# Patient Record
Sex: FEMALE | Race: OTHER | HISPANIC OR LATINO
[De-identification: names, ages, dates, MRNs, and addresses within clinical notes are randomized per-mention and may not be internally consistent; named-entity substitution may affect disease eponyms.]

---

## 2019-10-21 ENCOUNTER — APPOINTMENT (OUTPATIENT)
Dept: INTERNAL MEDICINE | Facility: CLINIC | Age: 64
End: 2019-10-21
Payer: MEDICAID

## 2019-10-21 VITALS — HEIGHT: 55.5 IN | BODY MASS INDEX: 39.93 KG/M2 | WEIGHT: 175 LBS

## 2019-10-21 VITALS — DIASTOLIC BLOOD PRESSURE: 76 MMHG | RESPIRATION RATE: 20 BRPM | SYSTOLIC BLOOD PRESSURE: 116 MMHG | HEART RATE: 72 BPM

## 2019-10-21 DIAGNOSIS — Z23 ENCOUNTER FOR IMMUNIZATION: ICD-10-CM

## 2019-10-21 DIAGNOSIS — Z82.49 FAMILY HISTORY OF ISCHEMIC HEART DISEASE AND OTHER DISEASES OF THE CIRCULATORY SYSTEM: ICD-10-CM

## 2019-10-21 DIAGNOSIS — M15.9 POLYOSTEOARTHRITIS, UNSPECIFIED: ICD-10-CM

## 2019-10-21 DIAGNOSIS — Z63.4 DISAPPEARANCE AND DEATH OF FAMILY MEMBER: ICD-10-CM

## 2019-10-21 DIAGNOSIS — Z78.9 OTHER SPECIFIED HEALTH STATUS: ICD-10-CM

## 2019-10-21 PROCEDURE — G0009: CPT

## 2019-10-21 PROCEDURE — 36415 COLL VENOUS BLD VENIPUNCTURE: CPT

## 2019-10-21 PROCEDURE — 90732 PPSV23 VACC 2 YRS+ SUBQ/IM: CPT

## 2019-10-21 PROCEDURE — 90715 TDAP VACCINE 7 YRS/> IM: CPT

## 2019-10-21 PROCEDURE — 99386 PREV VISIT NEW AGE 40-64: CPT | Mod: 25

## 2019-10-21 PROCEDURE — 90472 IMMUNIZATION ADMIN EACH ADD: CPT

## 2019-10-21 RX ORDER — POTASSIUM CHLORIDE 1500 MG/1
20 TABLET, EXTENDED RELEASE ORAL
Refills: 4 | Status: ACTIVE | COMMUNITY
Start: 2019-10-21

## 2019-10-21 RX ORDER — UPADACITINIB 15 MG/1
15 TABLET, EXTENDED RELEASE ORAL AT BEDTIME
Refills: 0 | Status: ACTIVE | COMMUNITY
Start: 2019-10-21

## 2019-10-21 SDOH — SOCIAL STABILITY - SOCIAL INSECURITY: DISSAPEARANCE AND DEATH OF FAMILY MEMBER: Z63.4

## 2019-10-21 NOTE — PHYSICAL EXAM
[No Acute Distress] : no acute distress [Well Developed] : well developed [Well Nourished] : well nourished [Well-Appearing] : well-appearing [Normal Sclera/Conjunctiva] : normal sclera/conjunctiva [EOMI] : extraocular movements intact [PERRL] : pupils equal round and reactive to light [Normal Outer Ear/Nose] : the outer ears and nose were normal in appearance [Normal Oropharynx] : the oropharynx was normal [No JVD] : no jugular venous distention [No Lymphadenopathy] : no lymphadenopathy [Supple] : supple [Thyroid Normal, No Nodules] : the thyroid was normal and there were no nodules present [No Accessory Muscle Use] : no accessory muscle use [No Respiratory Distress] : no respiratory distress  [Clear to Auscultation] : lungs were clear to auscultation bilaterally [Normal Rate] : normal rate  [Regular Rhythm] : with a regular rhythm [Normal S1, S2] : normal S1 and S2 [No Murmur] : no murmur heard [No Carotid Bruits] : no carotid bruits [No Abdominal Bruit] : a ~M bruit was not heard ~T in the abdomen [No Varicosities] : no varicosities [Pedal Pulses Present] : the pedal pulses are present [No Edema] : there was no peripheral edema [No Palpable Aorta] : no palpable aorta [No Extremity Clubbing/Cyanosis] : no extremity clubbing/cyanosis [Soft] : abdomen soft [Non Tender] : non-tender [No Masses] : no abdominal mass palpated [Non-distended] : non-distended [No HSM] : no HSM [Normal Posterior Cervical Nodes] : no posterior cervical lymphadenopathy [Normal Bowel Sounds] : normal bowel sounds [No Spinal Tenderness] : no spinal tenderness [No CVA Tenderness] : no CVA  tenderness [Normal Anterior Cervical Nodes] : no anterior cervical lymphadenopathy [No Rash] : no rash [Grossly Normal Strength/Tone] : grossly normal strength/tone [No Joint Swelling] : no joint swelling [Coordination Grossly Intact] : coordination grossly intact [No Focal Deficits] : no focal deficits [Normal Gait] : normal gait [Normal Affect] : the affect was normal [Deep Tendon Reflexes (DTR)] : deep tendon reflexes were 2+ and symmetric [Normal Insight/Judgement] : insight and judgment were intact [Comprehensive Foot Exam Normal] : Right and left foot were examined and both feet are normal. No ulcers in either foot. Toes are normal and with full ROM.  Normal tactile sensation with monofilament testing throughout both feet

## 2019-10-21 NOTE — HISTORY OF PRESENT ILLNESS
[FreeTextEntry1] : For CPE [de-identified] : For cpe\par has history of dm, htn, hld bilateral djd\par has been having persistent knee pain and difficulty walking\par patient sees rheum\par she has strong family history of ashd and has recently seen cardio for yearly\par

## 2019-10-21 NOTE — COUNSELING
[Benefits of weight loss discussed] : Benefits of weight loss discussed [Encouraged to increase physical activity] : Encouraged to increase physical activity [Encouraged to maintain food diary] : Encouraged to maintain food diary

## 2019-10-21 NOTE — ASSESSMENT
[FreeTextEntry1] : see ortho for bilateral oa eval for replacement vs treatment options. \par obtain cardio record\par check labs\par tdap and pnuemovax given

## 2019-10-21 NOTE — HEALTH RISK ASSESSMENT
[Very Good] : ~his/her~ current health as very good [No] : No [] : No [FreeTextEntry1] : knee pain [No falls in past year] : Patient reported no falls in the past year [0] : 2) Feeling down, depressed, or hopeless: Not at all (0) [Patient reported PAP Smear was normal] : Patient reported PAP Smear was normal [Patient reported mammogram was normal] : Patient reported mammogram was normal [HIV test declined] : HIV test declined [Hepatitis C test declined] : Hepatitis C test declined [Patient reported colonoscopy was normal] : Patient reported colonoscopy was normal [Language] : denies difficulty with language [Change in mental status noted] : No change in mental status noted [Handling Complex Tasks] : denies difficulty handling complex tasks [Learning/Retaining New Information] : denies difficulty learning/retaining new information [Behavior] : denies difficulty with behavior [Reasoning] : denies difficulty with reasoning [Spatial Ability and Orientation] : denies difficulty with spatial ability and orientation [With Family] : lives with family [High School] : high school [Sexually Active] : not sexually active [Retired] : retired [High Risk Behavior] : no high risk behavior [Fully functional (bathing, dressing, toileting, transferring, walking, feeding)] : Fully functional (bathing, dressing, toileting, transferring, walking, feeding) [Feels Safe at Home] : Feels safe at home [Reports changes in vision] : Reports no changes in vision [Reports changes in hearing] : Reports no changes in hearing [Reports changes in dental health] : Reports no changes in dental health [Smoke Detector] : smoke detector [Guns at Home] : no guns at home [Carbon Monoxide Detector] : no carbon monoxide detector [Seat Belt] :  uses seat belt [Safety elements used in home] : safety elements used in home [Sunscreen] : does not use sunscreen [Travel to Developing Areas] : does not  travel to developing areas [ColonoscopyDate] : 2016 [BoneDensityDate] : at [facility] [AdvancecareDate] : 10/21/19 [Patient/Caregiver not ready to engage] : Patient/Caregiver not ready to engage

## 2019-10-22 ENCOUNTER — CHART COPY (OUTPATIENT)
Age: 64
End: 2019-10-22

## 2019-10-22 LAB
25(OH)D3 SERPL-MCNC: 33.4 NG/ML
ALBUMIN SERPL ELPH-MCNC: 3.6 G/DL
ALP BLD-CCNC: 77 U/L
ALT SERPL-CCNC: 23 U/L
ANION GAP SERPL CALC-SCNC: 14 MMOL/L
APPEARANCE: CLEAR
AST SERPL-CCNC: 34 U/L
BASOPHILS # BLD AUTO: 0.03 K/UL
BASOPHILS NFR BLD AUTO: 0.3 %
BILIRUB SERPL-MCNC: 0.2 MG/DL
BILIRUBIN URINE: NEGATIVE
BLOOD URINE: NEGATIVE
BUN SERPL-MCNC: 12 MG/DL
CALCIUM SERPL-MCNC: 9.9 MG/DL
CHLORIDE SERPL-SCNC: 92 MMOL/L
CHOLEST SERPL-MCNC: 178 MG/DL
CHOLEST/HDLC SERPL: 3.6 RATIO
CO2 SERPL-SCNC: 28 MMOL/L
COLOR: YELLOW
CREAT SERPL-MCNC: 0.71 MG/DL
CREAT SPEC-SCNC: 101 MG/DL
EOSINOPHIL # BLD AUTO: 0.1 K/UL
EOSINOPHIL NFR BLD AUTO: 1.1 %
ESTIMATED AVERAGE GLUCOSE: 169 MG/DL
GLUCOSE QUALITATIVE U: NEGATIVE
GLUCOSE SERPL-MCNC: 152 MG/DL
HBA1C MFR BLD HPLC: 7.5 %
HCT VFR BLD CALC: 29.9 %
HCV AB SER QL: NONREACTIVE
HCV S/CO RATIO: 0.14 S/CO
HDLC SERPL-MCNC: 49 MG/DL
HGB BLD-MCNC: 9 G/DL
IMM GRANULOCYTES NFR BLD AUTO: 0.6 %
KETONES URINE: NEGATIVE
LDLC SERPL CALC-MCNC: 88 MG/DL
LEUKOCYTE ESTERASE URINE: NEGATIVE
LYMPHOCYTES # BLD AUTO: 1.29 K/UL
LYMPHOCYTES NFR BLD AUTO: 13.9 %
MAN DIFF?: NORMAL
MCHC RBC-ENTMCNC: 22.9 PG
MCHC RBC-ENTMCNC: 30.1 GM/DL
MCV RBC AUTO: 76.1 FL
MICROALBUMIN 24H UR DL<=1MG/L-MCNC: <1.2 MG/DL
MICROALBUMIN/CREAT 24H UR-RTO: NORMAL MG/G
MONOCYTES # BLD AUTO: 0.52 K/UL
MONOCYTES NFR BLD AUTO: 5.6 %
NEUTROPHILS # BLD AUTO: 7.25 K/UL
NEUTROPHILS NFR BLD AUTO: 78.5 %
NITRITE URINE: NEGATIVE
PH URINE: 6.5
PLATELET # BLD AUTO: 576 K/UL
POTASSIUM SERPL-SCNC: 4.1 MMOL/L
PROT SERPL-MCNC: 6.9 G/DL
PROTEIN URINE: NEGATIVE
RBC # BLD: 3.93 M/UL
RBC # FLD: 17.2 %
SODIUM SERPL-SCNC: 134 MMOL/L
SPECIFIC GRAVITY URINE: 1.02
T4 FREE SERPL-MCNC: 1.4 NG/DL
TRIGL SERPL-MCNC: 206 MG/DL
TSH SERPL-ACNC: 2.5 UIU/ML
UROBILINOGEN URINE: NORMAL
WBC # FLD AUTO: 9.25 K/UL

## 2019-10-26 LAB — HUMAN IMMUNODEFICIENCY VIRUS 1 (HIV-1) QUALITATIVE, RNA: NEGATIVE

## 2019-12-16 ENCOUNTER — APPOINTMENT (OUTPATIENT)
Dept: INTERNAL MEDICINE | Facility: CLINIC | Age: 64
End: 2019-12-16
Payer: MEDICARE

## 2019-12-16 ENCOUNTER — LABORATORY RESULT (OUTPATIENT)
Age: 64
End: 2019-12-16

## 2019-12-16 VITALS — RESPIRATION RATE: 20 BRPM | HEART RATE: 70 BPM | DIASTOLIC BLOOD PRESSURE: 78 MMHG | SYSTOLIC BLOOD PRESSURE: 114 MMHG

## 2019-12-16 VITALS — WEIGHT: 162 LBS | HEIGHT: 55.5 IN | BODY MASS INDEX: 36.96 KG/M2

## 2019-12-16 LAB
ALBUMIN SERPL ELPH-MCNC: 3.2 G/DL
ALP BLD-CCNC: 86 U/L
ALT SERPL-CCNC: 19 U/L
ANION GAP SERPL CALC-SCNC: 11 MMOL/L
AST SERPL-CCNC: 29 U/L
BILIRUB SERPL-MCNC: 0.2 MG/DL
BUN SERPL-MCNC: 14 MG/DL
CALCIUM SERPL-MCNC: 9.4 MG/DL
CHLORIDE SERPL-SCNC: 94 MMOL/L
CO2 SERPL-SCNC: 29 MMOL/L
CREAT SERPL-MCNC: 0.64 MG/DL
FERRITIN SERPL-MCNC: 945 NG/ML
GLUCOSE SERPL-MCNC: 176 MG/DL
IRON SATN MFR SERPL: 12 %
IRON SERPL-MCNC: 28 UG/DL
POTASSIUM SERPL-SCNC: 4.1 MMOL/L
PROT SERPL-MCNC: 7 G/DL
SODIUM SERPL-SCNC: 134 MMOL/L
TIBC SERPL-MCNC: 217 UG/DL

## 2019-12-16 PROCEDURE — 36415 COLL VENOUS BLD VENIPUNCTURE: CPT

## 2019-12-16 PROCEDURE — 99214 OFFICE O/P EST MOD 30 MIN: CPT | Mod: 25

## 2019-12-16 NOTE — HISTORY OF PRESENT ILLNESS
[de-identified] : patient here for follow up \par has been to rheum and found to have hgb 7.4 secondary to her RA\par patient has been off her meds now for few months and her RA flared up\par she feels tiired and is very stiff.. she denies chest pain sob nvd \par she has noted no blood in her stool or reflux symptoms\par she came today becsue she was advised blood transfusion but was not sure of it [FreeTextEntry1] : follow up

## 2019-12-16 NOTE — PHYSICAL EXAM
[No Acute Distress] : no acute distress [Well Nourished] : well nourished [Normal Sclera/Conjunctiva] : normal sclera/conjunctiva [EOMI] : extraocular movements intact [PERRL] : pupils equal round and reactive to light [Normal Outer Ear/Nose] : the outer ears and nose were normal in appearance [Normal Oropharynx] : the oropharynx was normal [No JVD] : no jugular venous distention [No Lymphadenopathy] : no lymphadenopathy [Supple] : supple [No Respiratory Distress] : no respiratory distress  [Thyroid Normal, No Nodules] : the thyroid was normal and there were no nodules present [Clear to Auscultation] : lungs were clear to auscultation bilaterally [No Accessory Muscle Use] : no accessory muscle use [Normal Rate] : normal rate  [Regular Rhythm] : with a regular rhythm [Normal S1, S2] : normal S1 and S2 [No Murmur] : no murmur heard [No Carotid Bruits] : no carotid bruits [No Abdominal Bruit] : a ~M bruit was not heard ~T in the abdomen [No Varicosities] : no varicosities [Pedal Pulses Present] : the pedal pulses are present [No Edema] : there was no peripheral edema [No Palpable Aorta] : no palpable aorta [No Extremity Clubbing/Cyanosis] : no extremity clubbing/cyanosis [Non Tender] : non-tender [Soft] : abdomen soft [Non-distended] : non-distended [No HSM] : no HSM [No Masses] : no abdominal mass palpated [Normal Bowel Sounds] : normal bowel sounds [Normal Posterior Cervical Nodes] : no posterior cervical lymphadenopathy [Normal Anterior Cervical Nodes] : no anterior cervical lymphadenopathy [No CVA Tenderness] : no CVA  tenderness [No Spinal Tenderness] : no spinal tenderness [Coordination Grossly Intact] : coordination grossly intact [No Rash] : no rash [No Focal Deficits] : no focal deficits [Deep Tendon Reflexes (DTR)] : deep tendon reflexes were 2+ and symmetric [Normal Gait] : normal gait [Normal Affect] : the affect was normal [Normal Insight/Judgement] : insight and judgment were intact [de-identified] : very stiff joints knee and elbows, wrist. [de-identified] : pale lookin

## 2019-12-16 NOTE — ASSESSMENT
[FreeTextEntry1] : refer to heme onc\par check stat cbc to confirm anemia, reached out to RHeum but not in the office\par so will send stat labs out\par hold bp meds bp moderately low today\par patient very stiff so likely its RA related

## 2019-12-23 ENCOUNTER — CHART COPY (OUTPATIENT)
Age: 64
End: 2019-12-23

## 2020-01-07 ENCOUNTER — APPOINTMENT (OUTPATIENT)
Dept: INTERNAL MEDICINE | Facility: CLINIC | Age: 65
End: 2020-01-07
Payer: MEDICARE

## 2020-01-07 VITALS
RESPIRATION RATE: 12 BRPM | SYSTOLIC BLOOD PRESSURE: 110 MMHG | HEIGHT: 55.5 IN | HEART RATE: 68 BPM | BODY MASS INDEX: 36.96 KG/M2 | DIASTOLIC BLOOD PRESSURE: 78 MMHG | WEIGHT: 162 LBS

## 2020-01-07 PROCEDURE — 99214 OFFICE O/P EST MOD 30 MIN: CPT | Mod: 25

## 2020-01-07 PROCEDURE — 36415 COLL VENOUS BLD VENIPUNCTURE: CPT

## 2020-01-07 NOTE — ASSESSMENT
[FreeTextEntry1] : anemia s/p transfusion check cbc\par dm stable last labs\par bp stble\par see rheum and heme for anemia issues

## 2020-01-07 NOTE — PHYSICAL EXAM
[Well Nourished] : well nourished [No Acute Distress] : no acute distress [Well Developed] : well developed [Well-Appearing] : well-appearing [Normal Sclera/Conjunctiva] : normal sclera/conjunctiva [PERRL] : pupils equal round and reactive to light [EOMI] : extraocular movements intact [Normal Outer Ear/Nose] : the outer ears and nose were normal in appearance [Normal Oropharynx] : the oropharynx was normal [No JVD] : no jugular venous distention [No Lymphadenopathy] : no lymphadenopathy [Supple] : supple [Thyroid Normal, No Nodules] : the thyroid was normal and there were no nodules present [No Accessory Muscle Use] : no accessory muscle use [No Respiratory Distress] : no respiratory distress  [Normal Rate] : normal rate  [Clear to Auscultation] : lungs were clear to auscultation bilaterally [Regular Rhythm] : with a regular rhythm [Normal S1, S2] : normal S1 and S2 [No Murmur] : no murmur heard [No Carotid Bruits] : no carotid bruits [No Varicosities] : no varicosities [No Abdominal Bruit] : a ~M bruit was not heard ~T in the abdomen [Pedal Pulses Present] : the pedal pulses are present [No Edema] : there was no peripheral edema [No Palpable Aorta] : no palpable aorta [No Extremity Clubbing/Cyanosis] : no extremity clubbing/cyanosis [Soft] : abdomen soft [Non Tender] : non-tender [Non-distended] : non-distended [No Masses] : no abdominal mass palpated [No HSM] : no HSM [Normal Bowel Sounds] : normal bowel sounds [Normal Posterior Cervical Nodes] : no posterior cervical lymphadenopathy [Normal Anterior Cervical Nodes] : no anterior cervical lymphadenopathy [No CVA Tenderness] : no CVA  tenderness [No Spinal Tenderness] : no spinal tenderness [No Joint Swelling] : no joint swelling [Grossly Normal Strength/Tone] : grossly normal strength/tone [No Rash] : no rash [Coordination Grossly Intact] : coordination grossly intact [No Focal Deficits] : no focal deficits [Normal Gait] : normal gait [Deep Tendon Reflexes (DTR)] : deep tendon reflexes were 2+ and symmetric [Normal Affect] : the affect was normal [Normal Insight/Judgement] : insight and judgment were intact

## 2020-01-07 NOTE — HISTORY OF PRESENT ILLNESS
[FreeTextEntry1] : follow up hospitalization in December [de-identified] : was in hosptial with anemia of chronic disease secondary to RA\par patient received transfusion but got a reaction\par patient has no chest pain sob \par now back on humira by rheum and is on prednisone 5mg\par she will see heme onc soon

## 2020-01-08 LAB
BASOPHILS # BLD AUTO: 0.05 K/UL
BASOPHILS NFR BLD AUTO: 0.3 %
EOSINOPHIL # BLD AUTO: 0.18 K/UL
EOSINOPHIL NFR BLD AUTO: 1.1 %
HCT VFR BLD CALC: 31.7 %
HGB BLD-MCNC: 9.6 G/DL
IMM GRANULOCYTES NFR BLD AUTO: 0.9 %
LYMPHOCYTES # BLD AUTO: 1.21 K/UL
LYMPHOCYTES NFR BLD AUTO: 7.7 %
MAN DIFF?: NORMAL
MCHC RBC-ENTMCNC: 24.1 PG
MCHC RBC-ENTMCNC: 30.3 GM/DL
MCV RBC AUTO: 79.6 FL
MONOCYTES # BLD AUTO: 0.46 K/UL
MONOCYTES NFR BLD AUTO: 2.9 %
NEUTROPHILS # BLD AUTO: 13.63 K/UL
NEUTROPHILS NFR BLD AUTO: 87.1 %
PLATELET # BLD AUTO: 579 K/UL
RBC # BLD: 3.98 M/UL
RBC # FLD: 19.4 %
WBC # FLD AUTO: 15.67 K/UL

## 2020-01-24 ENCOUNTER — APPOINTMENT (OUTPATIENT)
Dept: INTERNAL MEDICINE | Facility: CLINIC | Age: 65
End: 2020-01-24
Payer: MEDICARE

## 2020-01-24 ENCOUNTER — INPATIENT (INPATIENT)
Facility: HOSPITAL | Age: 65
LOS: 10 days | Discharge: HOME | DRG: 177 | End: 2020-02-04
Attending: HOSPITALIST | Admitting: INTERNAL MEDICINE
Payer: MEDICARE

## 2020-01-24 VITALS
DIASTOLIC BLOOD PRESSURE: 70 MMHG | RESPIRATION RATE: 24 BRPM | HEART RATE: 120 BPM | OXYGEN SATURATION: 92 % | SYSTOLIC BLOOD PRESSURE: 110 MMHG

## 2020-01-24 VITALS
OXYGEN SATURATION: 95 % | WEIGHT: 153 LBS | DIASTOLIC BLOOD PRESSURE: 63 MMHG | HEIGHT: 59 IN | TEMPERATURE: 99 F | RESPIRATION RATE: 16 BRPM | SYSTOLIC BLOOD PRESSURE: 136 MMHG | HEART RATE: 101 BPM

## 2020-01-24 VITALS — BODY MASS INDEX: 37.19 KG/M2 | WEIGHT: 163 LBS | HEIGHT: 55.5 IN

## 2020-01-24 DIAGNOSIS — E87.70 FLUID OVERLOAD, UNSPECIFIED: ICD-10-CM

## 2020-01-24 DIAGNOSIS — I50.9 HEART FAILURE, UNSPECIFIED: ICD-10-CM

## 2020-01-24 LAB
ALBUMIN SERPL ELPH-MCNC: 2.7 G/DL — LOW (ref 3.3–5.2)
ALP SERPL-CCNC: 81 U/L — SIGNIFICANT CHANGE UP (ref 40–120)
ALT FLD-CCNC: 30 U/L — SIGNIFICANT CHANGE UP
ANION GAP SERPL CALC-SCNC: 11 MMOL/L — SIGNIFICANT CHANGE UP (ref 5–17)
APTT BLD: 24.2 SEC — LOW (ref 27.5–36.3)
AST SERPL-CCNC: 40 U/L — HIGH
BASOPHILS # BLD AUTO: 0.03 K/UL — SIGNIFICANT CHANGE UP (ref 0–0.2)
BASOPHILS NFR BLD AUTO: 0.2 % — SIGNIFICANT CHANGE UP (ref 0–2)
BILIRUB SERPL-MCNC: 0.3 MG/DL — LOW (ref 0.4–2)
BUN SERPL-MCNC: 12 MG/DL — SIGNIFICANT CHANGE UP (ref 8–20)
CALCIUM SERPL-MCNC: 7.8 MG/DL — LOW (ref 8.6–10.2)
CHLORIDE SERPL-SCNC: 94 MMOL/L — LOW (ref 98–107)
CO2 SERPL-SCNC: 29 MMOL/L — SIGNIFICANT CHANGE UP (ref 22–29)
CREAT SERPL-MCNC: 0.63 MG/DL — SIGNIFICANT CHANGE UP (ref 0.5–1.3)
EOSINOPHIL # BLD AUTO: 0.19 K/UL — SIGNIFICANT CHANGE UP (ref 0–0.5)
EOSINOPHIL NFR BLD AUTO: 1.1 % — SIGNIFICANT CHANGE UP (ref 0–6)
GLUCOSE BLDC GLUCOMTR-MCNC: 132 MG/DL — HIGH (ref 70–99)
GLUCOSE SERPL-MCNC: 130 MG/DL — HIGH (ref 70–99)
HCT VFR BLD CALC: 27.9 % — LOW (ref 34.5–45)
HGB BLD-MCNC: 8.4 G/DL — LOW (ref 11.5–15.5)
IMM GRANULOCYTES NFR BLD AUTO: 1.5 % — SIGNIFICANT CHANGE UP (ref 0–1.5)
INR BLD: 1.21 RATIO — HIGH (ref 0.88–1.16)
LYMPHOCYTES # BLD AUTO: 0.95 K/UL — LOW (ref 1–3.3)
LYMPHOCYTES # BLD AUTO: 5.6 % — LOW (ref 13–44)
MCHC RBC-ENTMCNC: 23.4 PG — LOW (ref 27–34)
MCHC RBC-ENTMCNC: 30.1 GM/DL — LOW (ref 32–36)
MCV RBC AUTO: 77.7 FL — LOW (ref 80–100)
MONOCYTES # BLD AUTO: 0.45 K/UL — SIGNIFICANT CHANGE UP (ref 0–0.9)
MONOCYTES NFR BLD AUTO: 2.7 % — SIGNIFICANT CHANGE UP (ref 2–14)
NEUTROPHILS # BLD AUTO: 15.05 K/UL — HIGH (ref 1.8–7.4)
NEUTROPHILS NFR BLD AUTO: 88.9 % — HIGH (ref 43–77)
NT-PROBNP SERPL-SCNC: 1079 PG/ML — HIGH (ref 0–300)
PLATELET # BLD AUTO: 693 K/UL — HIGH (ref 150–400)
POTASSIUM SERPL-MCNC: 3.6 MMOL/L — SIGNIFICANT CHANGE UP (ref 3.5–5.3)
POTASSIUM SERPL-SCNC: 3.6 MMOL/L — SIGNIFICANT CHANGE UP (ref 3.5–5.3)
PROT SERPL-MCNC: 6.9 G/DL — SIGNIFICANT CHANGE UP (ref 6.6–8.7)
PROTHROM AB SERPL-ACNC: 14 SEC — HIGH (ref 10–12.9)
RAPID RVP RESULT: SIGNIFICANT CHANGE UP
RBC # BLD: 3.59 M/UL — LOW (ref 3.8–5.2)
RBC # FLD: 18.6 % — HIGH (ref 10.3–14.5)
SODIUM SERPL-SCNC: 134 MMOL/L — LOW (ref 135–145)
TROPONIN T SERPL-MCNC: 0.1 NG/ML — HIGH (ref 0–0.06)
TROPONIN T SERPL-MCNC: 0.12 NG/ML — HIGH (ref 0–0.06)
WBC # BLD: 16.93 K/UL — HIGH (ref 3.8–10.5)
WBC # FLD AUTO: 16.93 K/UL — HIGH (ref 3.8–10.5)

## 2020-01-24 PROCEDURE — 99215 OFFICE O/P EST HI 40 MIN: CPT

## 2020-01-24 PROCEDURE — 93010 ELECTROCARDIOGRAM REPORT: CPT

## 2020-01-24 PROCEDURE — 99285 EMERGENCY DEPT VISIT HI MDM: CPT

## 2020-01-24 PROCEDURE — 99223 1ST HOSP IP/OBS HIGH 75: CPT

## 2020-01-24 PROCEDURE — 71045 X-RAY EXAM CHEST 1 VIEW: CPT | Mod: 26

## 2020-01-24 RX ORDER — IPRATROPIUM/ALBUTEROL SULFATE 18-103MCG
3 AEROSOL WITH ADAPTER (GRAM) INHALATION ONCE
Refills: 0 | Status: COMPLETED | OUTPATIENT
Start: 2020-01-24 | End: 2020-01-24

## 2020-01-24 RX ORDER — HEPARIN SODIUM 5000 [USP'U]/ML
5000 INJECTION INTRAVENOUS; SUBCUTANEOUS EVERY 12 HOURS
Refills: 0 | Status: DISCONTINUED | OUTPATIENT
Start: 2020-01-24 | End: 2020-01-26

## 2020-01-24 RX ORDER — DEXTROSE 50 % IN WATER 50 %
12.5 SYRINGE (ML) INTRAVENOUS ONCE
Refills: 0 | Status: DISCONTINUED | OUTPATIENT
Start: 2020-01-24 | End: 2020-02-04

## 2020-01-24 RX ORDER — METOPROLOL TARTRATE 50 MG
12.5 TABLET ORAL
Refills: 0 | Status: DISCONTINUED | OUTPATIENT
Start: 2020-01-24 | End: 2020-01-27

## 2020-01-24 RX ORDER — INSULIN LISPRO 100/ML
VIAL (ML) SUBCUTANEOUS
Refills: 0 | Status: DISCONTINUED | OUTPATIENT
Start: 2020-01-24 | End: 2020-02-04

## 2020-01-24 RX ORDER — GLUCAGON INJECTION, SOLUTION 0.5 MG/.1ML
1 INJECTION, SOLUTION SUBCUTANEOUS ONCE
Refills: 0 | Status: DISCONTINUED | OUTPATIENT
Start: 2020-01-24 | End: 2020-02-04

## 2020-01-24 RX ORDER — FUROSEMIDE 40 MG
40 TABLET ORAL ONCE
Refills: 0 | Status: COMPLETED | OUTPATIENT
Start: 2020-01-24 | End: 2020-01-24

## 2020-01-24 RX ORDER — POTASSIUM CHLORIDE 20 MEQ
20 PACKET (EA) ORAL DAILY
Refills: 0 | Status: DISCONTINUED | OUTPATIENT
Start: 2020-01-24 | End: 2020-02-04

## 2020-01-24 RX ORDER — DEXTROSE 50 % IN WATER 50 %
15 SYRINGE (ML) INTRAVENOUS ONCE
Refills: 0 | Status: DISCONTINUED | OUTPATIENT
Start: 2020-01-24 | End: 2020-02-04

## 2020-01-24 RX ORDER — SODIUM CHLORIDE 9 MG/ML
1000 INJECTION, SOLUTION INTRAVENOUS
Refills: 0 | Status: DISCONTINUED | OUTPATIENT
Start: 2020-01-24 | End: 2020-02-04

## 2020-01-24 RX ORDER — DEXTROSE 50 % IN WATER 50 %
25 SYRINGE (ML) INTRAVENOUS ONCE
Refills: 0 | Status: DISCONTINUED | OUTPATIENT
Start: 2020-01-24 | End: 2020-02-04

## 2020-01-24 RX ORDER — FUROSEMIDE 40 MG
40 TABLET ORAL DAILY
Refills: 0 | Status: DISCONTINUED | OUTPATIENT
Start: 2020-01-25 | End: 2020-01-26

## 2020-01-24 RX ADMIN — Medication 40 MILLIGRAM(S): at 14:38

## 2020-01-24 RX ADMIN — HEPARIN SODIUM 5000 UNIT(S): 5000 INJECTION INTRAVENOUS; SUBCUTANEOUS at 19:27

## 2020-01-24 RX ADMIN — Medication 3 MILLILITER(S): at 14:03

## 2020-01-24 RX ADMIN — Medication 12.5 MILLIGRAM(S): at 19:27

## 2020-01-24 NOTE — REVIEW OF SYSTEMS
[Fever] : no fever [Chills] : no chills [Fatigue] : fatigue [Night Sweats] : no night sweats [Recent Change In Weight] : ~T no recent weight change [Palpitations] : palpitations [Shortness Of Breath] : shortness of breath [Dyspnea on Exertion] : dyspnea on exertion [Cough] : cough

## 2020-01-24 NOTE — ED ADULT NURSE NOTE - OBJECTIVE STATEMENT
Pt had a blood transfusion back in December and went to say for the follow up. Pt states that she was still feeling short of breath. Has been feeling this way since being transfused in December.

## 2020-01-24 NOTE — H&P ADULT - ASSESSMENT
The patient is a 64 year old female with a history of rheumatoid arthritis on DMARD agents and prednisone, diabetes mellitus type 2, hypertension and chronic iron deficiency anemia who was referred to the ER by her PMD for complaints of difficulty breathing. According to the patient, she was admitted to [facility] from 12/17-12/20 for anemia. She was transfused 2 units of PRBC and discharged. SInce being discharged from the hospital she has had complaints of progressively worsening dyspnea on exertion associated with lower extremity edema. She was evaluated by her PMD today who referred her to the ER for evaluation. In the ER, noted to have SPo2 of 91% on room air improved with NC. Labs positive for a BNP of 1079 and troponin of 0.10. Patient denies complaints of chest pain, had stress test 3 months ago which she states was normal. Chest xray consistent with cardiomegaly and pulmonary edema. Admitted for acute CHF.     Assessment/Plan:    1. Acute CHF: EF unknown  IV lasix 40mg daily  Daily weight  Monitor I&Os  Echocardiogram  Cardiology evaluation requested    2. Elevated troponin: Trend cardiac enzymes  Denies complaints of chest pain  EKG with no acute changes  Cardio eval requested    3. Anemia on chronic disease: Monitor HB/hct    4. Hyponatremia  likely secondary to volume overload  MOnitor BMP    5. RA: Continue prednisone PO    6. Hypertension: Metoprolol PO   MOnitor BP    VTE_ Heparin subcut

## 2020-01-24 NOTE — ED PROVIDER NOTE - CLINICAL SUMMARY MEDICAL DECISION MAKING FREE TEXT BOX
65 yo F hx of anemia, CHF, HDL, DM p/w hypoxia and sob. she was found 91% on RA with rales and wheezing on the base. duoneb x 1 given. ekg showed sinus tachycardia. trop 0.10 and probnp 1079. lasix 40 mg IV given. concerned for CHF will need admission. 63 yo F hx of anemia, CHF, HDL, DM p/w hypoxia and sob. she was found 91% on RA with rales and wheezing on the base. duoneb x 1 given. ekg showed sinus tachycardia. trop 0.10 and probnp 1079. chf showed b/l pulmonary edema. lasix 40 mg IV given. concerned for CHF will need admission.

## 2020-01-24 NOTE — ED PROVIDER NOTE - PROGRESS NOTE DETAILS
I spoke to Dr. Rios for admission I spoke Dr. Zavala from [facility], will come to see the patient.

## 2020-01-24 NOTE — PHYSICAL EXAM
[Ill-Appearing] : ill-appearing [Normal] : the outer ears and nose were normal in appearance and the oropharynx was normal [de-identified] : appears dyspneic [de-identified] : preeti izquierdo [de-identified] : bilateral rales 1/3 way up

## 2020-01-24 NOTE — CONSULT NOTE ADULT - SUBJECTIVE AND OBJECTIVE BOX
[facility], [facility]                                                    [address], [address], [place] [address]                                                         PHONE: [phone]    FAX: [phone] [address], [address], [place] [address]                                                 PHONE: [phone]    FAX: [phone]  *******************************************************************************    Reason for Consult: Congestive Heart Failure    HPI:  MELY LIN is a 64y Female    PAST MEDICAL & SURGICAL HISTORY:      aspirin (Stomach Upset; Nausea)  penicillin (Angioedema)      MEDICATIONS  (STANDING):    MEDICATIONS  (PRN):      Social History: no active tobacco / EtOH / IVDA    Family History:     ROS: As noted above, otherwise unremarkable.    Vital Signs Last 24 Hrs  T(C): 37.1 (24 Jan 2020 12:11), Max: 37.1 (24 Jan 2020 12:11)  T(F): 98.8 (24 Jan 2020 12:11), Max: 98.8 (24 Jan 2020 12:11)  HR: 101 (24 Jan 2020 12:11) (101 - 101)  BP: 136/63 (24 Jan 2020 12:11) (136/63 - 136/63)  BP(mean): --  RR: 16 (24 Jan 2020 12:11) (16 - 16)  SpO2: 91% (24 Jan 2020 12:54) (91% - 95%)    I&O's Detail    I&O's Summary          PHYSICAL EXAM:  General: Appears well developed, well nourished, no acute distress  HEENT: Head: normocephalic, atraumatic  Eyes: Pupils equal and reactive  Neck: Supple, no carotid bruit, no JVD, no HJR  CARDIOVASCULAR: Normal S1 and S2, no murmur, rub, or gallop  LUNGS: Clear to auscultation bilaterally, no rales, rhonchi or wheeze  ABDOMEN: Soft, nontender, non-distended, positive bowel sounds, no mass or bruit  EXTREMITIES: No edema, distal pulses WNL  SKIN: Warm and dry with normal turgor  NEURO: Alert & oriented x 3, grossly intact  PSYCH: normal mood and affect    LABS:                        8.4    16.93 )-----------( 693      ( 24 Jan 2020 13:30 )             27.9     01-24    134<L>  |  94<L>  |  12.0  ----------------------------<  130<H>  3.6   |  29.0  |  0.63    Ca    7.8<L>      24 Jan 2020 13:30    TPro  6.9  /  Alb  2.7<L>  /  TBili  0.3<L>  /  DBili  x   /  AST  40<H>  /  ALT  30  /  AlkPhos  81  01-24    CARDIAC MARKERS ( 24 Jan 2020 13:30 )  x     / 0.10 ng/mL / x     / x     / x          PT/INR - ( 24 Jan 2020 13:30 )   PT: 14.0 sec;   INR: 1.21 ratio         PTT - ( 24 Jan 2020 13:30 )  PTT:24.2 sec  Serum Pro-Brain Natriuretic Peptide: 1079 pg/mL (01-24 @ 13:30)      RADIOLOGY & ADDITIONAL STUDIES:    ECG: p    ECHO:    STRESS TEST:    CARDIAC CATHETERIZATION:    Assessment and Plan:  In summary, MELY LIN is a 64y Female with past medical history significant for CAD,  (nml nuc 6/25/19),  HTN HL DM s/p SOB last few days, hypoxia  likely multifactorial URI/CHF No distress now laying flay , comfortable    - Monitor on telemetry  - Check troponins x3 will trend <.5 thus far hospitalist will enter additional troponins and call me if troponin >1.0  - cont outpt meds   - PT WAS NOT ON ASA AS OUTPT DUE TO HX ANEMIA - GIVE X 1  - Repeat EKG - TO BE DONE BY DR. SMITH who will review and call me with any abn or questions  - Echocardiogram - 6/18/19 - nml ef no severe valve dz  - Monitor repeat CXRs, strict I/Os, daily weights, & BUN/Cr closely and titrate PRN.  - No evidence of ischemia clinically.  Continue medical management for now pending the results of the above.  Plan for eventual ischemic evaluation (likely as an outpatient) once patient is clinically stable.  - Rhythm/hemodynamics stable = continue current doses for now and titrate PRN  - Keep K > 4, Mg > 2  - ECG - sinus no acute st/t  - pt given Lasix IV by Dr. Smith , will address daily based on sx  - please cont outpt meds ( to be entered by hospitalist)  - d/w family    We will follow with you.  Thank you for allowing me to participate in the care of your patient.      Sincerely,    Daniel Navarrete, DO

## 2020-01-24 NOTE — ED PROVIDER NOTE - OBJECTIVE STATEMENT
63yo female with PMHx of HTN and DM2 presents with SOB for 1 month. Patient states she was sent to the hospital by her doctor who told her she had "fluid on her lungs". Patient denies any chest pain, palpitations, cough, fevers, chills, nausea, vomiting or diarrhea. Patient is not on home oxygen. 63yo female with PMHx of HTN and DM2 presents with SOB for 1 month. Patient states she was sent to the hospital by her doctor who told her she had "fluid on her lungs". Patient denies any chest pain, palpitations, cough, fevers, chills, nausea, vomiting or diarrhea. Patient is not on home oxygen.    cards:

## 2020-01-24 NOTE — H&P ADULT - HISTORY OF PRESENT ILLNESS
The patient is a 64 year old female with a history of rheumatoid arthritis on DMARD agents and prednisone, diabetes mellitus type 2, hypertension and chronic iron deficiency anemia who was referred to the ER by her PMD for complaints of difficulty breathing. According to the patient, she was admitted to [facility] from 12/17-12/20 for anemia. She was transfused 2 units of PRBC and discharged. SInce being discharged from the hospital she has had complaints of progressively worsening dyspnea on exertion associated with lower extremity edema. She was evaluated by her PMD today who referred her to the ER for evaluation. In the ER, noted to have SPo2 of 91% on room air improved with NC. Labs positive for a BNP of 1079 and troponin of 0.10. Patient denies complaints of chest pain, had stress test 3 months ago which she states was normal. Chest xray consistent with cardiomegaly and pulmonary edema. Admitted for acute CHF.

## 2020-01-24 NOTE — ED PROVIDER NOTE - PHYSICAL EXAMINATION
VITAL SIGNS: I have reviewed nursing notes and confirm.  CONSTITUTIONAL: Well-developed; well-nourished; in no acute distress.  SKIN: Skin exam is warm and dry, no acute rash.  HEAD: Normocephalic; atraumatic.  EYES: PERRL, EOM intact; conjunctiva and sclera clear.  ENT: No nasal discharge; airway clear. Throat clear.  NECK: Supple; non tender.    CARD: S1, S2 normal; no murmurs, gallops, or rubs. Regular rate and rhythm.  RESP: + expiratory wheezes,  +Rhonchi, no rales   ABD:  soft; non-distended; non-tender;   EXT: Normal ROM. No clubbing, cyanosis or edema.  NEURO: Alert, oriented. Grossly unremarkable. No focal deficits. no facial droop, moves all extremities,  no pronator drift, finger-to-nose wnl, normal gait   PSYCH: Cooperative, appropriate. VITAL SIGNS: I have reviewed nursing notes and confirm.  CONSTITUTIONAL: Well-developed; well-nourished; in no acute distress.  SKIN: Skin exam is warm and dry, no acute rash.  HEAD: Normocephalic; atraumatic.  EYES: PERRL, EOM intact; conjunctiva and sclera clear.  ENT: No nasal discharge; airway clear. Throat clear.  NECK: Supple; non tender.    CARD: S1, S2 normal; no murmurs, gallops, or rubs. Regular rate and rhythm.  RESP: + expiratory wheezes,  +Rhonchi, at the base   ABD:  soft; non-distended; non-tender;   EXT: Normal ROM. No clubbing, cyanosis or edema.  NEURO: Alert, oriented. Grossly unremarkable. No focal deficits. no facial droop, moves all extremities,  PSYCH: Cooperative, appropriate.

## 2020-01-24 NOTE — ASSESSMENT
[FreeTextEntry1] : fluid overloaded, not cardiac\par has problems with anemia of late\par last hgb 9.6 was in [place] with anemia and received 2 units of blood in Late December Early January\par recently restarted on her rheumatoid meds. \par ambulance called for admission\par currently dyspneic but oxygenation at 2liter

## 2020-01-24 NOTE — ED PROVIDER NOTE - NS ED ROS FT
Review of Systems  •	CONSTITUTIONAL - no  fever, no diaphoresis, no weight change  •	SKIN - no rash  •	HEMATOLOGIC - no bleeding, no bruising  •	EYES - no eye pain, no blurred vision  •	ENT - no change in hearing, no pain  •	RESPIRATORY - +shortness of breath, no cough  •	CARDIAC - no chest pain, no palpitations  •	GI - no abd pain, no nausea, no vomiting, no diarrhea, no constipation, no bleeding  •	GENITO-URINARY - no discharge, no dysuria; no hematuria,   •	ENDO - no polydipsia, no polyuria, no heat/no cold intolerance  •	MUSCULOSKELETAL - no joint pain, no swelling, no redness  •	NEUROLOGIC - no weakness, no headache, no anesthesia, no paresthesias  •	PSYCH - no anxiety, non suicidal, non homicidal, no hallucination, no depression

## 2020-01-24 NOTE — HISTORY OF PRESENT ILLNESS
[FreeTextEntry1] : sob [de-identified] : patient here for follow up\par does not feel well\par patient has had anemia was recently transfused and feels sob currently\par patient has been restarted on her meds for her RA\par her room air oxygenation was 86 on  2liter is 97 percent\par she has no history of ashd

## 2020-01-25 LAB
ANION GAP SERPL CALC-SCNC: 12 MMOL/L — SIGNIFICANT CHANGE UP (ref 5–17)
BUN SERPL-MCNC: 12 MG/DL — SIGNIFICANT CHANGE UP (ref 8–20)
CALCIUM SERPL-MCNC: 7.6 MG/DL — LOW (ref 8.6–10.2)
CHLORIDE SERPL-SCNC: 91 MMOL/L — LOW (ref 98–107)
CO2 SERPL-SCNC: 30 MMOL/L — HIGH (ref 22–29)
CREAT SERPL-MCNC: 0.63 MG/DL — SIGNIFICANT CHANGE UP (ref 0.5–1.3)
GLUCOSE BLDC GLUCOMTR-MCNC: 110 MG/DL — HIGH (ref 70–99)
GLUCOSE BLDC GLUCOMTR-MCNC: 123 MG/DL — HIGH (ref 70–99)
GLUCOSE BLDC GLUCOMTR-MCNC: 131 MG/DL — HIGH (ref 70–99)
GLUCOSE BLDC GLUCOMTR-MCNC: 146 MG/DL — HIGH (ref 70–99)
GLUCOSE BLDC GLUCOMTR-MCNC: 99 MG/DL — SIGNIFICANT CHANGE UP (ref 70–99)
GLUCOSE SERPL-MCNC: 127 MG/DL — HIGH (ref 70–99)
HBA1C BLD-MCNC: 6.3 % — HIGH (ref 4–5.6)
HCT VFR BLD CALC: 26.3 % — LOW (ref 34.5–45)
HCV AB S/CO SERPL IA: 0.16 S/CO — SIGNIFICANT CHANGE UP (ref 0–0.99)
HCV AB SERPL-IMP: SIGNIFICANT CHANGE UP
HGB BLD-MCNC: 7.9 G/DL — LOW (ref 11.5–15.5)
MCHC RBC-ENTMCNC: 23.2 PG — LOW (ref 27–34)
MCHC RBC-ENTMCNC: 30 GM/DL — LOW (ref 32–36)
MCV RBC AUTO: 77.4 FL — LOW (ref 80–100)
PLATELET # BLD AUTO: 672 K/UL — HIGH (ref 150–400)
POTASSIUM SERPL-MCNC: 3.6 MMOL/L — SIGNIFICANT CHANGE UP (ref 3.5–5.3)
POTASSIUM SERPL-SCNC: 3.6 MMOL/L — SIGNIFICANT CHANGE UP (ref 3.5–5.3)
RBC # BLD: 3.4 M/UL — LOW (ref 3.8–5.2)
RBC # FLD: 18.8 % — HIGH (ref 10.3–14.5)
SODIUM SERPL-SCNC: 133 MMOL/L — LOW (ref 135–145)
TROPONIN T SERPL-MCNC: 0.12 NG/ML — HIGH (ref 0–0.06)
WBC # BLD: 13.69 K/UL — HIGH (ref 3.8–10.5)
WBC # FLD AUTO: 13.69 K/UL — HIGH (ref 3.8–10.5)

## 2020-01-25 PROCEDURE — 93306 TTE W/DOPPLER COMPLETE: CPT | Mod: 26

## 2020-01-25 PROCEDURE — 71275 CT ANGIOGRAPHY CHEST: CPT | Mod: 26

## 2020-01-25 PROCEDURE — 99233 SBSQ HOSP IP/OBS HIGH 50: CPT

## 2020-01-25 RX ORDER — ACETAMINOPHEN 500 MG
1000 TABLET ORAL ONCE
Refills: 0 | Status: COMPLETED | OUTPATIENT
Start: 2020-01-25 | End: 2020-01-25

## 2020-01-25 RX ORDER — ATORVASTATIN CALCIUM 80 MG/1
20 TABLET, FILM COATED ORAL AT BEDTIME
Refills: 0 | Status: DISCONTINUED | OUTPATIENT
Start: 2020-01-25 | End: 2020-02-04

## 2020-01-25 RX ORDER — BENZOCAINE AND MENTHOL 5; 1 G/100ML; G/100ML
1 LIQUID ORAL EVERY 6 HOURS
Refills: 0 | Status: DISCONTINUED | OUTPATIENT
Start: 2020-01-25 | End: 2020-02-04

## 2020-01-25 RX ORDER — ACETAMINOPHEN 500 MG
650 TABLET ORAL EVERY 6 HOURS
Refills: 0 | Status: DISCONTINUED | OUTPATIENT
Start: 2020-01-25 | End: 2020-02-04

## 2020-01-25 RX ADMIN — Medication 400 MILLIGRAM(S): at 03:57

## 2020-01-25 RX ADMIN — Medication 40 MILLIGRAM(S): at 05:45

## 2020-01-25 RX ADMIN — Medication 650 MILLIGRAM(S): at 18:15

## 2020-01-25 RX ADMIN — Medication 12.5 MILLIGRAM(S): at 05:45

## 2020-01-25 RX ADMIN — Medication 5 MILLIGRAM(S): at 01:17

## 2020-01-25 RX ADMIN — Medication 1000 MILLIGRAM(S): at 04:15

## 2020-01-25 RX ADMIN — Medication 12.5 MILLIGRAM(S): at 16:16

## 2020-01-25 RX ADMIN — HEPARIN SODIUM 5000 UNIT(S): 5000 INJECTION INTRAVENOUS; SUBCUTANEOUS at 16:17

## 2020-01-25 RX ADMIN — HEPARIN SODIUM 5000 UNIT(S): 5000 INJECTION INTRAVENOUS; SUBCUTANEOUS at 05:45

## 2020-01-25 RX ADMIN — ATORVASTATIN CALCIUM 20 MILLIGRAM(S): 80 TABLET, FILM COATED ORAL at 21:58

## 2020-01-25 RX ADMIN — Medication 20 MILLIEQUIVALENT(S): at 07:45

## 2020-01-25 NOTE — PROGRESS NOTE ADULT - SUBJECTIVE AND OBJECTIVE BOX
CC: Follow up    INTERVAL HPI/OVERNIGHT EVENTS: Patient seen and examined, family at bedside for translation. Still feels short of breath on o2 via nasal cannula.       Vital Signs Last 24 Hrs  T(C): 36.8 (25 Jan 2020 11:42), Max: 37.4 (25 Jan 2020 02:27)  T(F): 98.2 (25 Jan 2020 11:42), Max: 99.4 (25 Jan 2020 02:27)  HR: 102 (25 Jan 2020 11:42) (96 - 110)  BP: 97/53 (25 Jan 2020 11:42) (97/53 - 129/82)  BP(mean): --  RR: 20 (25 Jan 2020 11:42) (18 - 20)  SpO2: 99% (25 Jan 2020 11:42) (98% - 100%)    PHYSICAL EXAM:    GENERAL: NAD, AOX3  HEAD:  Atraumatic, Normocephalic  ENMT: Moist mucous membranes  NECK: Supple, No JVD  CHEST/LUNG: Bilateral crackles   HEART: Regular rate and rhythm; No murmurs, rubs, or gallops  ABDOMEN: Soft, Nontender, Nondistended; Bowel sounds present  EXTREMITIES:  2+ Peripheral Pulses, No clubbing, cyanosis, 1+ pedal edema        MEDICATIONS  (STANDING):  atorvastatin 20 milliGRAM(s) Oral at bedtime  dextrose 5%. 1000 milliLiter(s) (50 mL/Hr) IV Continuous <Continuous>  dextrose 50% Injectable 12.5 Gram(s) IV Push once  dextrose 50% Injectable 25 Gram(s) IV Push once  dextrose 50% Injectable 25 Gram(s) IV Push once  furosemide   Injectable 40 milliGRAM(s) IV Push daily  heparin  Injectable 5000 Unit(s) SubCutaneous every 12 hours  insulin lispro (HumaLOG) corrective regimen sliding scale   SubCutaneous three times a day before meals  metoprolol tartrate 12.5 milliGRAM(s) Oral two times a day  potassium chloride    Tablet ER 20 milliEquivalent(s) Oral daily  predniSONE   Tablet 5 milliGRAM(s) Oral daily    MEDICATIONS  (PRN):  dextrose 40% Gel 15 Gram(s) Oral once PRN Blood Glucose LESS THAN 70 milliGRAM(s)/deciliter  glucagon  Injectable 1 milliGRAM(s) IntraMuscular once PRN Glucose LESS THAN 70 milligrams/deciliter      Allergies    aspirin (Stomach Upset; Nausea)  penicillin (Angioedema)    Intolerances          LABS:                          7.9    13.69 )-----------( 672      ( 25 Jan 2020 09:19 )             26.3     01-25    133<L>  |  91<L>  |  12.0  ----------------------------<  127<H>  3.6   |  30.0<H>  |  0.63    Ca    7.6<L>      25 Jan 2020 09:19    TPro  6.9  /  Alb  2.7<L>  /  TBili  0.3<L>  /  DBili  x   /  AST  40<H>  /  ALT  30  /  AlkPhos  81  01-24    PT/INR - ( 24 Jan 2020 13:30 )   PT: 14.0 sec;   INR: 1.21 ratio         PTT - ( 24 Jan 2020 13:30 )  PTT:24.2 sec      RADIOLOGY & ADDITIONAL TESTS:

## 2020-01-25 NOTE — PATIENT PROFILE ADULT - NSPROGENOTHERPROVIDER_GEN_A_NUR
Spoke with Dr Mercedes Self from Pathology, she said patient's biopsy results are in  If you have any questions to please call her  none

## 2020-01-25 NOTE — PROGRESS NOTE ADULT - SUBJECTIVE AND OBJECTIVE BOX
[facility], [facility]                                          [address] , [address], [place] [address]                                               PHONE: [phone]    FAX: [phone] [address] [facility] [address], [address], [place] [address]                                       PHONE: [phone]    FAX: [phone]  *******************************************************************************    Overnight events/Subjective Assessment: laying flat, reports breathing is improved, but not yet back to normal.  No CP, palpitations, dizziness.  Has not yet ambulated    aspirin (Stomach Upset; Nausea)  penicillin (Angioedema)    MEDICATIONS  (STANDING):  dextrose 5%. 1000 milliLiter(s) (50 mL/Hr) IV Continuous <Continuous>  dextrose 50% Injectable 12.5 Gram(s) IV Push once  dextrose 50% Injectable 25 Gram(s) IV Push once  dextrose 50% Injectable 25 Gram(s) IV Push once  furosemide   Injectable 40 milliGRAM(s) IV Push daily  heparin  Injectable 5000 Unit(s) SubCutaneous every 12 hours  insulin lispro (HumaLOG) corrective regimen sliding scale   SubCutaneous three times a day before meals  metoprolol tartrate 12.5 milliGRAM(s) Oral two times a day  potassium chloride    Tablet ER 20 milliEquivalent(s) Oral daily  predniSONE   Tablet 5 milliGRAM(s) Oral daily    MEDICATIONS  (PRN):  dextrose 40% Gel 15 Gram(s) Oral once PRN Blood Glucose LESS THAN 70 milliGRAM(s)/deciliter  glucagon  Injectable 1 milliGRAM(s) IntraMuscular once PRN Glucose LESS THAN 70 milligrams/deciliter      Vital Signs Last 24 Hrs  T(C): 37.4 (25 Jan 2020 02:27), Max: 37.4 (25 Jan 2020 02:27)  T(F): 99.4 (25 Jan 2020 02:27), Max: 99.4 (25 Jan 2020 02:27)  HR: 110 (25 Jan 2020 02:27) (101 - 110)  BP: 110/63 (25 Jan 2020 02:27) (110/63 - 136/63)  BP(mean): --  RR: 20 (25 Jan 2020 02:27) (16 - 20)  SpO2: 100% (25 Jan 2020 02:27) (91% - 100%)    I&O's Detail    I&O's Summary          PHYSICAL EXAM:  General: Appears well developed, well nourished, no acute distress  HEENT: Head: normocephalic, atraumatic  Eyes: Pupils equal and reactive  Neck: Supple, no carotid bruit, no JVD, no HJR  CARDIOVASCULAR: Normal S1 and S2, no murmur, rub, or gallop  LUNGS: Clear to auscultation bilaterally, no rales, rhonchi or wheeze  ABDOMEN: Soft, nontender, non-distended, positive bowel sounds, no mass or bruit  EXTREMITIES: No edema, distal pulses WNL  SKIN: Warm and dry with normal turgor  NEURO: Alert & oriented x 3, grossly intact  PSYCH: normal mood and affect        LABS:                        8.4    16.93 )-----------( 693      ( 24 Jan 2020 13:30 )             27.9     01-24    134<L>  |  94<L>  |  12.0  ----------------------------<  130<H>  3.6   |  29.0  |  0.63    Ca    7.8<L>      24 Jan 2020 13:30    TPro  6.9  /  Alb  2.7<L>  /  TBili  0.3<L>  /  DBili  x   /  AST  40<H>  /  ALT  30  /  AlkPhos  81  01-24    CARDIAC MARKERS ( 24 Jan 2020 19:51 )  x     / 0.12 ng/mL / x     / x     / x      CARDIAC MARKERS ( 24 Jan 2020 13:30 )  x     / 0.10 ng/mL / x     / x     / x          PT/INR - ( 24 Jan 2020 13:30 )   PT: 14.0 sec;   INR: 1.21 ratio         PTT - ( 24 Jan 2020 13:30 )  PTT:24.2 sec  Serum Pro-Brain Natriuretic Peptide: 1079 pg/mL (01-24 @ 13:30)  serum  Lipids:         RADIOLOGY & ADDITIONAL STUDIES:    ASSESSMENT AND PLAN:  MELY LIN is a 64y Female with HTN, HLD, DM, CAD based on elevated coronary artery calcium score, negative nuclear stress test 6/25/19, RA, chronic anemia with recent admission to [facility] 12/2019 for PRBC transfusion, admitted with hypoxia, SOB and LE edema, mildly elevated troponin.  Acute exacerbation of likely diastolic CHF +/- URI.    - Monitor on telemetry  - Troponin 0.1--0.12, continue to trend. Likely represents demand ischemia.  - Known CAD based on elevated coronary artery calcium score with negative nuclear stress test 6/25/19.  No chest pain or ischemic EKG changes.  Given anemia requiring recent PRBC transfusion would prefer outpatient nuclear stress test as initial strategy, pending clinical course.  - Ideally should be on ASA.  ASA listed in allergies as "upset stomach, nausea."  This is more of an intolerance.  Would retry if there is no evidence of bleeding perhaps with PPI  - Acute exacerbation of likely diastolic CHF.  Improving with diuresis.  Continue Lasix 40mg IV daily for now, possibly convert to PO tomorrow  Monitor I/O's, daily weights, renal function.  - Keep K > 4, Mg > 2  - Monitor H/H  - Echocardiogram pending  - Check lipids, start Lipitor 20 for now, titrate prn       Greg Weems MD

## 2020-01-26 LAB
ANION GAP SERPL CALC-SCNC: 14 MMOL/L — SIGNIFICANT CHANGE UP (ref 5–17)
BUN SERPL-MCNC: 10 MG/DL — SIGNIFICANT CHANGE UP (ref 8–20)
CALCIUM SERPL-MCNC: 7.2 MG/DL — LOW (ref 8.6–10.2)
CHLORIDE SERPL-SCNC: 88 MMOL/L — LOW (ref 98–107)
CHOLEST SERPL-MCNC: 136 MG/DL — SIGNIFICANT CHANGE UP (ref 110–199)
CO2 SERPL-SCNC: 29 MMOL/L — SIGNIFICANT CHANGE UP (ref 22–29)
CREAT SERPL-MCNC: 0.59 MG/DL — SIGNIFICANT CHANGE UP (ref 0.5–1.3)
GLUCOSE BLDC GLUCOMTR-MCNC: 113 MG/DL — HIGH (ref 70–99)
GLUCOSE BLDC GLUCOMTR-MCNC: 142 MG/DL — HIGH (ref 70–99)
GLUCOSE BLDC GLUCOMTR-MCNC: 162 MG/DL — HIGH (ref 70–99)
GLUCOSE SERPL-MCNC: 148 MG/DL — HIGH (ref 70–99)
HCT VFR BLD CALC: 26.1 % — LOW (ref 34.5–45)
HDLC SERPL-MCNC: 30 MG/DL — LOW
HGB BLD-MCNC: 7.9 G/DL — LOW (ref 11.5–15.5)
LIPID PNL WITH DIRECT LDL SERPL: 69 MG/DL — SIGNIFICANT CHANGE UP
MAGNESIUM SERPL-MCNC: 1.4 MG/DL — LOW (ref 1.6–2.6)
MCHC RBC-ENTMCNC: 23.2 PG — LOW (ref 27–34)
MCHC RBC-ENTMCNC: 30.3 GM/DL — LOW (ref 32–36)
MCV RBC AUTO: 76.8 FL — LOW (ref 80–100)
PLATELET # BLD AUTO: 653 K/UL — HIGH (ref 150–400)
POTASSIUM SERPL-MCNC: 3.5 MMOL/L — SIGNIFICANT CHANGE UP (ref 3.5–5.3)
POTASSIUM SERPL-SCNC: 3.5 MMOL/L — SIGNIFICANT CHANGE UP (ref 3.5–5.3)
RBC # BLD: 3.4 M/UL — LOW (ref 3.8–5.2)
RBC # FLD: 18.6 % — HIGH (ref 10.3–14.5)
SODIUM SERPL-SCNC: 131 MMOL/L — LOW (ref 135–145)
TOTAL CHOLESTEROL/HDL RATIO MEASUREMENT: 5 RATIO — SIGNIFICANT CHANGE UP (ref 3.3–7.1)
TRIGL SERPL-MCNC: 184 MG/DL — SIGNIFICANT CHANGE UP (ref 10–200)
TROPONIN T SERPL-MCNC: 0.11 NG/ML — HIGH (ref 0–0.06)
WBC # BLD: 17.96 K/UL — HIGH (ref 3.8–10.5)
WBC # FLD AUTO: 17.96 K/UL — HIGH (ref 3.8–10.5)

## 2020-01-26 PROCEDURE — 99233 SBSQ HOSP IP/OBS HIGH 50: CPT

## 2020-01-26 RX ORDER — VANCOMYCIN HCL 1 G
1000 VIAL (EA) INTRAVENOUS ONCE
Refills: 0 | Status: COMPLETED | OUTPATIENT
Start: 2020-01-26 | End: 2020-01-26

## 2020-01-26 RX ORDER — AZTREONAM 2 G
1000 VIAL (EA) INJECTION ONCE
Refills: 0 | Status: COMPLETED | OUTPATIENT
Start: 2020-01-26 | End: 2020-01-26

## 2020-01-26 RX ORDER — IPRATROPIUM/ALBUTEROL SULFATE 18-103MCG
3 AEROSOL WITH ADAPTER (GRAM) INHALATION EVERY 6 HOURS
Refills: 0 | Status: DISCONTINUED | OUTPATIENT
Start: 2020-01-26 | End: 2020-02-04

## 2020-01-26 RX ORDER — POTASSIUM CHLORIDE 20 MEQ
20 PACKET (EA) ORAL ONCE
Refills: 0 | Status: COMPLETED | OUTPATIENT
Start: 2020-01-26 | End: 2020-01-26

## 2020-01-26 RX ORDER — OXYCODONE HYDROCHLORIDE 5 MG/1
5 TABLET ORAL ONCE
Refills: 0 | Status: DISCONTINUED | OUTPATIENT
Start: 2020-01-26 | End: 2020-01-26

## 2020-01-26 RX ORDER — CALCIUM CARBONATE 500(1250)
1 TABLET ORAL
Refills: 0 | Status: DISCONTINUED | OUTPATIENT
Start: 2020-01-26 | End: 2020-02-04

## 2020-01-26 RX ORDER — FUROSEMIDE 40 MG
40 TABLET ORAL DAILY
Refills: 0 | Status: DISCONTINUED | OUTPATIENT
Start: 2020-01-27 | End: 2020-01-29

## 2020-01-26 RX ORDER — MAGNESIUM SULFATE 500 MG/ML
2 VIAL (ML) INJECTION ONCE
Refills: 0 | Status: COMPLETED | OUTPATIENT
Start: 2020-01-26 | End: 2020-01-26

## 2020-01-26 RX ORDER — ACETAMINOPHEN 500 MG
650 TABLET ORAL EVERY 6 HOURS
Refills: 0 | Status: DISCONTINUED | OUTPATIENT
Start: 2020-01-26 | End: 2020-02-04

## 2020-01-26 RX ORDER — AZTREONAM 2 G
VIAL (EA) INJECTION
Refills: 0 | Status: DISCONTINUED | OUTPATIENT
Start: 2020-01-26 | End: 2020-01-29

## 2020-01-26 RX ORDER — AZTREONAM 2 G
1000 VIAL (EA) INJECTION EVERY 8 HOURS
Refills: 0 | Status: DISCONTINUED | OUTPATIENT
Start: 2020-01-26 | End: 2020-01-29

## 2020-01-26 RX ADMIN — OXYCODONE HYDROCHLORIDE 5 MILLIGRAM(S): 5 TABLET ORAL at 00:23

## 2020-01-26 RX ADMIN — Medication 50 GRAM(S): at 16:47

## 2020-01-26 RX ADMIN — Medication 250 MILLIGRAM(S): at 11:37

## 2020-01-26 RX ADMIN — Medication 1 TABLET(S): at 16:54

## 2020-01-26 RX ADMIN — Medication 650 MILLIGRAM(S): at 23:30

## 2020-01-26 RX ADMIN — Medication 2: at 08:18

## 2020-01-26 RX ADMIN — BENZOCAINE AND MENTHOL 1 LOZENGE: 5; 1 LIQUID ORAL at 00:22

## 2020-01-26 RX ADMIN — Medication 650 MILLIGRAM(S): at 16:48

## 2020-01-26 RX ADMIN — Medication 50 MILLIGRAM(S): at 22:40

## 2020-01-26 RX ADMIN — Medication 5 MILLIGRAM(S): at 05:16

## 2020-01-26 RX ADMIN — Medication 650 MILLIGRAM(S): at 22:40

## 2020-01-26 RX ADMIN — OXYCODONE HYDROCHLORIDE 5 MILLIGRAM(S): 5 TABLET ORAL at 00:50

## 2020-01-26 RX ADMIN — Medication 20 MILLIEQUIVALENT(S): at 16:47

## 2020-01-26 RX ADMIN — Medication 40 MILLIGRAM(S): at 05:16

## 2020-01-26 RX ADMIN — Medication 50 MILLIGRAM(S): at 11:36

## 2020-01-26 RX ADMIN — Medication 12.5 MILLIGRAM(S): at 16:47

## 2020-01-26 RX ADMIN — Medication 20 MILLIEQUIVALENT(S): at 11:37

## 2020-01-26 RX ADMIN — Medication 12.5 MILLIGRAM(S): at 05:16

## 2020-01-26 RX ADMIN — ATORVASTATIN CALCIUM 20 MILLIGRAM(S): 80 TABLET, FILM COATED ORAL at 22:40

## 2020-01-26 RX ADMIN — HEPARIN SODIUM 5000 UNIT(S): 5000 INJECTION INTRAVENOUS; SUBCUTANEOUS at 05:15

## 2020-01-26 RX ADMIN — Medication 650 MILLIGRAM(S): at 17:00

## 2020-01-26 NOTE — PROGRESS NOTE ADULT - SUBJECTIVE AND OBJECTIVE BOX
[facility]                                          [address] , [address], [place] [address]                                               PHONE: [phone]    FAX: [phone] [address] [facility], [address], [place] [address]                                       PHONE: [phone]    FAX: [phone]  *******************************************************************************    Overnight events/Subjective Assessment:  No CP, palpitations, dizziness.  I tried to ambulate her -> unsteady gait and dyspnea.    INTERPRETATION OF TELEMETRY (personally reviewed): -120s, no events    aspirin (Stomach Upset; Nausea)  penicillin (Angioedema)    MEDICATIONS  (STANDING):  atorvastatin 20 milliGRAM(s) Oral at bedtime  aztreonam  IVPB      aztreonam  IVPB 1000 milliGRAM(s) IV Intermittent every 8 hours  calcium carbonate    500 mG (Tums) Chewable 1 Tablet(s) Chew two times a day  dextrose 5%. 1000 milliLiter(s) (50 mL/Hr) IV Continuous <Continuous>  dextrose 50% Injectable 12.5 Gram(s) IV Push once  dextrose 50% Injectable 25 Gram(s) IV Push once  dextrose 50% Injectable 25 Gram(s) IV Push once  insulin lispro (HumaLOG) corrective regimen sliding scale   SubCutaneous three times a day before meals  magnesium sulfate  IVPB 2 Gram(s) IV Intermittent once  metoprolol tartrate 12.5 milliGRAM(s) Oral two times a day  potassium chloride    Tablet ER 20 milliEquivalent(s) Oral daily  potassium chloride    Tablet ER 20 milliEquivalent(s) Oral once  predniSONE   Tablet 5 milliGRAM(s) Oral daily    MEDICATIONS  (PRN):  acetaminophen   Tablet .. 650 milliGRAM(s) Oral every 6 hours PRN Mild Pain (1 - 3)  acetaminophen   Tablet .. 650 milliGRAM(s) Oral every 6 hours PRN Temp greater or equal to 38C (100.4F)  albuterol/ipratropium for Nebulization 3 milliLiter(s) Nebulizer every 6 hours PRN Shortness of Breath and/or Wheezing  benzocaine 15 mG/menthol 3.6 mG (Sugar-Free) Lozenge 1 Lozenge Oral every 6 hours PRN Sore Throat  dextrose 40% Gel 15 Gram(s) Oral once PRN Blood Glucose LESS THAN 70 milliGRAM(s)/deciliter  glucagon  Injectable 1 milliGRAM(s) IntraMuscular once PRN Glucose LESS THAN 70 milligrams/deciliter      Vital Signs Last 24 Hrs  T(C): 37.1 (26 Jan 2020 08:25), Max: 37.7 (25 Jan 2020 17:46)  T(F): 98.8 (26 Jan 2020 08:25), Max: 99.9 (25 Jan 2020 17:46)  HR: 113 (26 Jan 2020 08:25) (110 - 125)  BP: 112/71 (26 Jan 2020 08:25) (107/64 - 122/77)  BP(mean): --  RR: 17 (26 Jan 2020 08:25) (17 - 19)  SpO2: 98% (26 Jan 2020 08:25) (96% - 99%)    I&O's Detail    I&O's Summary          PHYSICAL EXAM:  General: Appears well developed, well nourished, no acute distress  HEENT: Head: normocephalic, atraumatic  Eyes: Pupils equal and reactive  Neck: Supple, no carotid bruit, no JVD, no HJR  CARDIOVASCULAR: Normal S1 and S2, no murmur, rub, or gallop  LUNGS: Clear to auscultation bilaterally, no rales, coarse breath sounds  ABDOMEN: Soft, nontender, non-distended, positive bowel sounds, no mass or bruit  EXTREMITIES: No edema, distal pulses WNL  SKIN: Warm and dry with normal turgor  NEURO: Alert & oriented x 3, grossly intact  PSYCH: normal mood and affect        LABS:                        7.9    17.96 )-----------( 653      ( 26 Jan 2020 08:31 )             26.1     01-26    131<L>  |  88<L>  |  10.0  ----------------------------<  148<H>  3.5   |  29.0  |  0.59    Ca    7.2<L>      26 Jan 2020 08:31  Mg     1.4     01-26      CARDIAC MARKERS ( 26 Jan 2020 08:31 )  x     / 0.11 ng/mL / x     / x     / x      CARDIAC MARKERS ( 25 Jan 2020 09:19 )  x     / 0.12 ng/mL / x     / x     / x      CARDIAC MARKERS ( 24 Jan 2020 19:51 )  x     / 0.12 ng/mL / x     / x     / x            Serum Pro-Brain Natriuretic Peptide: 1079 pg/mL (01-24 @ 13:30)  serum  Lipids:   Hemoglobin A1C, Whole Blood: 6.3 % (01-25 @ 09:19)      < from: CT Angio Chest w/ IV Cont (01.25.20 @ 21:25) >  IMPRESSION:   1. Small pericardial effusion.   2. Multifocal irregular consolidations bilaterally compatible with pneumonia.    < end of copied text >    < from: TTE Echo Complete w/Doppler (01.25.20 @ 09:25) >  PHYSICIAN INTERPRETATION:  Left Ventricle: The left ventricular internal cavity size is normal.  Global LV systolic function was normal. Left ventricular ejection fraction, by visual estimation, is 60 to 65%. Spectral Doppler shows normal pattern of LV diastolic filling.  Right Ventricle: The right ventricular size is normal. RV systolic function is normal.  Left Atrium: Mild to moderately enlarged left atrium. Lipomatous hypertrophy of the intra-atrial septum.  Right Atrium: Normal right atrial size.  Pericardium: Trivial pericardial effusion is present. There is a significant pericardial fat pad present.  Mitral Valve: The mitral valve is normal in structure. Mild thickening of the anterior and posterior mitral valve leaflets. No evidence of mitral stenosis. Mild to moderate mitral valve regurgitation is seen. The MR jet is centrally-directed.  Tricuspid Valve: The tricuspid valve is normal in structure. Trivial tricuspid regurgitation is visualized. Adequate TR velocity was not obtained to accurately assess RVSP.  Aortic Valve: The aortic valve is trileaflet. Mild to moderate aortic stenosis is present. Mild aorticvalve regurgitation is seen.  Pulmonic Valve: Trace pulmonic valve regurgitation.  Aorta: The aortic root and ascending aorta are structurally normal, with no evidence of dilitation.  Venous: The inferior vena cava was normal sized, with respiratory size variation greater than 50%.       Summary:   1. Left ventricular ejection fraction, by visual estimation, is 60 to 65%.   2. Normal global left ventricular systolic function.   3. Normal left ventricular internal cavity size.   4. There is mild concentric left ventricular hypertrophy.   5. Mild to moderately enlarged left atrium.   6. Mild thickening of the anterior and posterior mitral valve leaflets.   7. Mild to moderate mitral valve regurgitation.   8. Mild to moderate aortic valve stenosis.   9. Mild aortic regurgitation.  10. Lipomatous hypertrophy of the intra-atrial septum.  11. Trivial pericardial effusion.    < end of copied text >  	    ASSESSMENT AND PLAN:  MELY LIN is a 64y Female with HTN, HLD, DM, CAD based on elevated coronary artery calcium score, negative nuclear stress test 6/25/19, RA, chronic anemia with recent admission to [facility] 12/2019 for PRBC transfusion, admitted with hypoxia, SOB and LE edema, mildly elevated troponin.  Acute exacerbation of likely diastolic CHF, resolved. CT scan evidence of bilateral PNA>    - Monitor on telemetry  - Troponin 0.1--0.12-0.12.  Flat trend not consistent with ACS.  Likely represents demand ischemia.  - Known CAD based on elevated coronary artery calcium score with negative nuclear stress test 6/25/19.  No chest pain or ischemic EKG changes.  Given anemia requiring recent PRBC transfusion would prefer outpatient nuclear stress test as initial strategy.  - Ideally should be on ASA.  ASA listed in allergies as "upset stomach, nausea."  This is more of an intolerance.  Would retry if there is no evidence of bleeding perhaps with PPI  - Acute exacerbation of likely diastolic CHF.  Improved with diuresis.  Appears euvolemic.  Agree with change to Lasix 40mg PO daily  - Keep K > 4, Mg > 2  - Monitor H/H  - Echocardiogram 1/25/20 EF 60-65%, LAE, mild-mod MR, mild-mod AS, trivial pericardial effusion.  - LDL 69, continue Lipitor 20 for now  - BCx have been sent and pt started on Abx for bilateral PNA.  - No further inpatient cardiac work up planned.  Routine outpatient follow up in our office after discharge.  Will follow as needed.  Please call with any CV questions or concerns.    Greg Weems MD

## 2020-01-26 NOTE — PROGRESS NOTE ADULT - SUBJECTIVE AND OBJECTIVE BOX
CHIEF COMPLAINT/INTERVAL HISTORY:    Patient is a 64y old  Female who presents with a chief complaint of Difficulty breathing (25 Jan 2020 12:53)    SUBJECTIVE & OBJECTIVE: Pt seen and examined at bedside. No overnight events. Patient reports feeling better today. Complaining of chronic RA pain, but otherwise SOB has improved. Leukocytosis worsening; low grade fever overnight. CT chest with pneumonia. Cultures drawn, started on abx.    ROS: No chest pain, palpitations, light headedness, dizziness, headache, nausea/vomiting, fevers/chills, abdominal pain, dysuria.    ICU Vital Signs Last 24 Hrs  T(C): 37.1 (26 Jan 2020 08:25), Max: 37.7 (25 Jan 2020 17:46)  T(F): 98.8 (26 Jan 2020 08:25), Max: 99.9 (25 Jan 2020 17:46)  HR: 113 (26 Jan 2020 08:25) (110 - 125)  BP: 112/71 (26 Jan 2020 08:25) (107/64 - 122/77)  RR: 17 (26 Jan 2020 08:25) (17 - 19)  SpO2: 98% (26 Jan 2020 08:25) (96% - 99%)    MEDICATIONS  (STANDING):  atorvastatin 20 milliGRAM(s) Oral at bedtime  aztreonam  IVPB      aztreonam  IVPB 1000 milliGRAM(s) IV Intermittent every 8 hours  calcium carbonate    500 mG (Tums) Chewable 1 Tablet(s) Chew two times a day  dextrose 5%. 1000 milliLiter(s) (50 mL/Hr) IV Continuous <Continuous>  dextrose 50% Injectable 12.5 Gram(s) IV Push once  dextrose 50% Injectable 25 Gram(s) IV Push once  dextrose 50% Injectable 25 Gram(s) IV Push once  heparin  Injectable 5000 Unit(s) SubCutaneous every 12 hours  insulin lispro (HumaLOG) corrective regimen sliding scale   SubCutaneous three times a day before meals  magnesium sulfate  IVPB 2 Gram(s) IV Intermittent once  metoprolol tartrate 12.5 milliGRAM(s) Oral two times a day  potassium chloride    Tablet ER 20 milliEquivalent(s) Oral daily  potassium chloride    Tablet ER 20 milliEquivalent(s) Oral once  predniSONE   Tablet 5 milliGRAM(s) Oral daily    MEDICATIONS  (PRN):  acetaminophen   Tablet .. 650 milliGRAM(s) Oral every 6 hours PRN Mild Pain (1 - 3)  acetaminophen   Tablet .. 650 milliGRAM(s) Oral every 6 hours PRN Temp greater or equal to 38C (100.4F)  albuterol/ipratropium for Nebulization 3 milliLiter(s) Nebulizer every 6 hours PRN Shortness of Breath and/or Wheezing  benzocaine 15 mG/menthol 3.6 mG (Sugar-Free) Lozenge 1 Lozenge Oral every 6 hours PRN Sore Throat  dextrose 40% Gel 15 Gram(s) Oral once PRN Blood Glucose LESS THAN 70 milliGRAM(s)/deciliter  glucagon  Injectable 1 milliGRAM(s) IntraMuscular once PRN Glucose LESS THAN 70 milligrams/deciliter      LABS:                        7.9    17.96 )-----------( 653      ( 26 Jan 2020 08:31 )             26.1     01-26    131<L>  |  88<L>  |  10.0  ----------------------------<  148<H>  3.5   |  29.0  |  0.59    Ca    7.2<L>      26 Jan 2020 08:31  Mg     1.4     01-26        CAPILLARY BLOOD GLUCOSE      POCT Blood Glucose.: 142 mg/dL (26 Jan 2020 11:29)  POCT Blood Glucose.: 162 mg/dL (26 Jan 2020 08:16)  POCT Blood Glucose.: 131 mg/dL (25 Jan 2020 22:09)  POCT Blood Glucose.: 123 mg/dL (25 Jan 2020 16:07)      RECENT CULTURES:      RADIOLOGY & ADDITIONAL TESTS:      PHYSICAL EXAM:    GENERAL: elderly female, laying in bed, NAD  HEAD:  Atraumatic, Normocephalic  EYES: EOMI, PERRLA, conjunctiva and sclera clear  ENMT: Moist mucous membranes  NECK: Supple   NERVOUS SYSTEM:  Alert & Oriented X3   CHEST/LUNG: coarse breath sounds  HEART: Regular rate and rhythm; + S1/S2  ABDOMEN: Soft, Nontender, Nondistended; Bowel sounds present  EXTREMITIES:  no pedal edema

## 2020-01-27 LAB
ANION GAP SERPL CALC-SCNC: 12 MMOL/L — SIGNIFICANT CHANGE UP (ref 5–17)
BASOPHILS # BLD AUTO: 0.04 K/UL — SIGNIFICANT CHANGE UP (ref 0–0.2)
BASOPHILS NFR BLD AUTO: 0.3 % — SIGNIFICANT CHANGE UP (ref 0–2)
BLD GP AB SCN SERPL QL: SIGNIFICANT CHANGE UP
BUN SERPL-MCNC: 12 MG/DL — SIGNIFICANT CHANGE UP (ref 8–20)
CALCIUM SERPL-MCNC: 7.1 MG/DL — LOW (ref 8.6–10.2)
CHLORIDE SERPL-SCNC: 91 MMOL/L — LOW (ref 98–107)
CO2 SERPL-SCNC: 30 MMOL/L — HIGH (ref 22–29)
CREAT SERPL-MCNC: 0.51 MG/DL — SIGNIFICANT CHANGE UP (ref 0.5–1.3)
EOSINOPHIL # BLD AUTO: 0.31 K/UL — SIGNIFICANT CHANGE UP (ref 0–0.5)
EOSINOPHIL NFR BLD AUTO: 2.4 % — SIGNIFICANT CHANGE UP (ref 0–6)
FERRITIN SERPL-MCNC: 2845 NG/ML — HIGH (ref 15–150)
GLUCOSE BLDC GLUCOMTR-MCNC: 103 MG/DL — HIGH (ref 70–99)
GLUCOSE BLDC GLUCOMTR-MCNC: 109 MG/DL — HIGH (ref 70–99)
GLUCOSE BLDC GLUCOMTR-MCNC: 142 MG/DL — HIGH (ref 70–99)
GLUCOSE BLDC GLUCOMTR-MCNC: 202 MG/DL — HIGH (ref 70–99)
GLUCOSE SERPL-MCNC: 121 MG/DL — HIGH (ref 70–99)
HCT VFR BLD CALC: 25.9 % — LOW (ref 34.5–45)
HGB BLD-MCNC: 7.8 G/DL — LOW (ref 11.5–15.5)
IMM GRANULOCYTES NFR BLD AUTO: 0.9 % — SIGNIFICANT CHANGE UP (ref 0–1.5)
IRON SATN MFR SERPL: 10 % — LOW (ref 14–50)
IRON SATN MFR SERPL: 15 UG/DL — LOW (ref 37–145)
LYMPHOCYTES # BLD AUTO: 0.88 K/UL — LOW (ref 1–3.3)
LYMPHOCYTES # BLD AUTO: 6.9 % — LOW (ref 13–44)
MAGNESIUM SERPL-MCNC: 2.1 MG/DL — SIGNIFICANT CHANGE UP (ref 1.6–2.6)
MCHC RBC-ENTMCNC: 23.2 PG — LOW (ref 27–34)
MCHC RBC-ENTMCNC: 30.1 GM/DL — LOW (ref 32–36)
MCV RBC AUTO: 77.1 FL — LOW (ref 80–100)
MONOCYTES # BLD AUTO: 0.31 K/UL — SIGNIFICANT CHANGE UP (ref 0–0.9)
MONOCYTES NFR BLD AUTO: 2.4 % — SIGNIFICANT CHANGE UP (ref 2–14)
NEUTROPHILS # BLD AUTO: 11.13 K/UL — HIGH (ref 1.8–7.4)
NEUTROPHILS NFR BLD AUTO: 87.1 % — HIGH (ref 43–77)
OSMOLALITY SERPL: 286 MOSMOL/KG — SIGNIFICANT CHANGE UP (ref 280–301)
PHOSPHATE SERPL-MCNC: 2.7 MG/DL — SIGNIFICANT CHANGE UP (ref 2.4–4.7)
PLATELET # BLD AUTO: 665 K/UL — HIGH (ref 150–400)
POTASSIUM SERPL-MCNC: 3.6 MMOL/L — SIGNIFICANT CHANGE UP (ref 3.5–5.3)
POTASSIUM SERPL-SCNC: 3.6 MMOL/L — SIGNIFICANT CHANGE UP (ref 3.5–5.3)
RBC # BLD: 3.36 M/UL — LOW (ref 3.8–5.2)
RBC # FLD: 18.4 % — HIGH (ref 10.3–14.5)
SODIUM SERPL-SCNC: 133 MMOL/L — LOW (ref 135–145)
TIBC SERPL-MCNC: 153 UG/DL — LOW (ref 220–430)
TRANSFERRIN SERPL-MCNC: 107 MG/DL — LOW (ref 192–382)
TSH SERPL-MCNC: 1.54 UIU/ML — SIGNIFICANT CHANGE UP (ref 0.27–4.2)
URATE SERPL-MCNC: 6.6 MG/DL — HIGH (ref 2.4–5.7)
WBC # BLD: 12.78 K/UL — HIGH (ref 3.8–10.5)
WBC # FLD AUTO: 12.78 K/UL — HIGH (ref 3.8–10.5)

## 2020-01-27 PROCEDURE — 99233 SBSQ HOSP IP/OBS HIGH 50: CPT

## 2020-01-27 RX ORDER — METOPROLOL TARTRATE 50 MG
2.5 TABLET ORAL ONCE
Refills: 0 | Status: COMPLETED | OUTPATIENT
Start: 2020-01-27 | End: 2020-01-27

## 2020-01-27 RX ORDER — METOPROLOL TARTRATE 50 MG
25 TABLET ORAL EVERY 12 HOURS
Refills: 0 | Status: DISCONTINUED | OUTPATIENT
Start: 2020-01-27 | End: 2020-01-28

## 2020-01-27 RX ORDER — LACTULOSE 10 G/15ML
10 SOLUTION ORAL ONCE
Refills: 0 | Status: COMPLETED | OUTPATIENT
Start: 2020-01-27 | End: 2020-01-27

## 2020-01-27 RX ORDER — FUROSEMIDE 40 MG
40 TABLET ORAL ONCE
Refills: 0 | Status: COMPLETED | OUTPATIENT
Start: 2020-01-27 | End: 2020-12-25

## 2020-01-27 RX ORDER — FUROSEMIDE 40 MG
40 TABLET ORAL ONCE
Refills: 0 | Status: COMPLETED | OUTPATIENT
Start: 2020-01-27 | End: 2020-01-27

## 2020-01-27 RX ORDER — POLYETHYLENE GLYCOL 3350 17 G/17G
17 POWDER, FOR SOLUTION ORAL DAILY
Refills: 0 | Status: DISCONTINUED | OUTPATIENT
Start: 2020-01-27 | End: 2020-02-04

## 2020-01-27 RX ADMIN — Medication 40 MILLIGRAM(S): at 16:08

## 2020-01-27 RX ADMIN — ATORVASTATIN CALCIUM 20 MILLIGRAM(S): 80 TABLET, FILM COATED ORAL at 21:40

## 2020-01-27 RX ADMIN — Medication 1 TABLET(S): at 17:26

## 2020-01-27 RX ADMIN — Medication 50 MILLIGRAM(S): at 06:48

## 2020-01-27 RX ADMIN — POLYETHYLENE GLYCOL 3350 17 GRAM(S): 17 POWDER, FOR SOLUTION ORAL at 12:20

## 2020-01-27 RX ADMIN — Medication 4: at 12:20

## 2020-01-27 RX ADMIN — Medication 650 MILLIGRAM(S): at 22:50

## 2020-01-27 RX ADMIN — Medication 650 MILLIGRAM(S): at 06:55

## 2020-01-27 RX ADMIN — LACTULOSE 10 GRAM(S): 10 SOLUTION ORAL at 17:25

## 2020-01-27 RX ADMIN — Medication 2.5 MILLIGRAM(S): at 12:20

## 2020-01-27 RX ADMIN — Medication 40 MILLIGRAM(S): at 06:53

## 2020-01-27 RX ADMIN — Medication 50 MILLIGRAM(S): at 17:25

## 2020-01-27 RX ADMIN — Medication 1 TABLET(S): at 06:48

## 2020-01-27 RX ADMIN — Medication 20 MILLIEQUIVALENT(S): at 11:34

## 2020-01-27 RX ADMIN — Medication 5 MILLIGRAM(S): at 06:50

## 2020-01-27 RX ADMIN — Medication 25 MILLIGRAM(S): at 17:26

## 2020-01-27 RX ADMIN — Medication 12.5 MILLIGRAM(S): at 06:51

## 2020-01-27 RX ADMIN — Medication 650 MILLIGRAM(S): at 21:53

## 2020-01-27 NOTE — PHYSICAL THERAPY INITIAL EVALUATION ADULT - PLANNED THERAPY INTERVENTIONS, PT EVAL
balance training/gait training/motor coordination training/neuromuscular re-education/strengthening/transfer training/manual therapy techniques/bed mobility training

## 2020-01-27 NOTE — PHYSICAL THERAPY INITIAL EVALUATION ADULT - PERTINENT HX OF CURRENT PROBLEM, REHAB EVAL
The patient is a 64 year old female with a history of rheumatoid arthritis on DMARD agents and prednisone, diabetes mellitus type 2, hypertension and chronic iron deficiency anemia who was referred to the ER by her PMD for complaints of difficulty breathing. Admitted with acute CHF and anemia

## 2020-01-27 NOTE — PROGRESS NOTE ADULT - SUBJECTIVE AND OBJECTIVE BOX
CC: Difficulty breathing/CHF/Anemia    INTERVAL HPI/OVERNIGHT EVENTS: Patient seen and examined. Anxious over needing blood transfusion as after receiving during last hospitalization went into CHF. Denies chest pain, SOB, nausea, vomiting, fever, chills. Family at bedside, agrees to transfusion.     Vital Signs Last 24 Hrs  T(C): 36.7 (27 Jan 2020 12:55), Max: 38.1 (27 Jan 2020 03:30)  T(F): 98.1 (27 Jan 2020 12:55), Max: 100.5 (27 Jan 2020 03:30)  HR: 120 (27 Jan 2020 15:10) (110 - 136)  BP: 115/74 (27 Jan 2020 12:55) (111/67 - 126/69)  BP(mean): 92 (27 Jan 2020 02:30) (92 - 92)  RR: 18 (27 Jan 2020 15:10) (17 - 20)  SpO2: 96% (27 Jan 2020 15:10) (94% - 98%)      PHYSICAL EXAM:    GENERAL: elderly female,  NAD  HEAD:  Atraumatic, Normocephalic  EYES: EOMI, PERRLA, conjunctiva and sclera clear  ENMT: Moist mucous membranes  NECK: Supple   NERVOUS SYSTEM:  Alert & Oriented X3, non focal  CHEST/LUNG: Base crackles  HEART: Regular rate and rhythm; + S1/S2  ABDOMEN: Soft, Nontender, Nondistended; Bowel sounds present  EXTREMITIES:  no pedal edema      I&O's Detail      CARDIAC MARKERS ( 26 Jan 2020 08:31 )  x     / 0.11 ng/mL / x     / x     / x                                7.8    12.78 )-----------( 665      ( 27 Jan 2020 05:58 )             25.9     27 Jan 2020 05:58    133    |  91     |  12.0   ----------------------------<  121    3.6     |  30.0   |  0.51     Ca    7.1        27 Jan 2020 05:58  Phos  2.7       27 Jan 2020 05:58  Mg     2.1       27 Jan 2020 05:58        CAPILLARY BLOOD GLUCOSE      POCT Blood Glucose.: 202 mg/dL (27 Jan 2020 12:01)  POCT Blood Glucose.: 142 mg/dL (27 Jan 2020 07:40)  POCT Blood Glucose.: 113 mg/dL (26 Jan 2020 16:52)        Hemoglobin A1C, Whole Blood: 6.3 % (01-25-20 @ 09:19)    MEDICATIONS  (STANDING):  atorvastatin 20 milliGRAM(s) Oral at bedtime  aztreonam  IVPB      aztreonam  IVPB 1000 milliGRAM(s) IV Intermittent every 8 hours  calcium carbonate    500 mG (Tums) Chewable 1 Tablet(s) Chew two times a day  dextrose 5%. 1000 milliLiter(s) (50 mL/Hr) IV Continuous <Continuous>  dextrose 50% Injectable 12.5 Gram(s) IV Push once  dextrose 50% Injectable 25 Gram(s) IV Push once  dextrose 50% Injectable 25 Gram(s) IV Push once  furosemide    Tablet 40 milliGRAM(s) Oral daily  furosemide   Injectable 40 milliGRAM(s) IV Push once  insulin lispro (HumaLOG) corrective regimen sliding scale   SubCutaneous three times a day before meals  lactulose Syrup 10 Gram(s) Oral once  metoprolol tartrate 25 milliGRAM(s) Oral every 12 hours  polyethylene glycol 3350 17 Gram(s) Oral daily  potassium chloride    Tablet ER 20 milliEquivalent(s) Oral daily  predniSONE   Tablet 5 milliGRAM(s) Oral daily    MEDICATIONS  (PRN):  acetaminophen   Tablet .. 650 milliGRAM(s) Oral every 6 hours PRN Mild Pain (1 - 3)  acetaminophen   Tablet .. 650 milliGRAM(s) Oral every 6 hours PRN Temp greater or equal to 38C (100.4F)  albuterol/ipratropium for Nebulization 3 milliLiter(s) Nebulizer every 6 hours PRN Shortness of Breath and/or Wheezing  benzocaine 15 mG/menthol 3.6 mG (Sugar-Free) Lozenge 1 Lozenge Oral every 6 hours PRN Sore Throat  dextrose 40% Gel 15 Gram(s) Oral once PRN Blood Glucose LESS THAN 70 milliGRAM(s)/deciliter  glucagon  Injectable 1 milliGRAM(s) IntraMuscular once PRN Glucose LESS THAN 70 milligrams/deciliter      RADIOLOGY & ADDITIONAL TESTS:

## 2020-01-27 NOTE — CHART NOTE - NSCHARTNOTEFT_GEN_A_CORE
Medicine PA-   S- Cd. for pt. w/  on monitor. Pt. admitted w/ SOB, hx anemia (x2 units blood received during admission [facility] 12/17- 12/20) rheum. arthritis, DM, HTN, CHF. CT chest 1/25- + bilat. consolidations bases, +PNA, x1 dose vanco and placed on azactam, pt. takes lopressor 12.5mg bid. Pt. denies CP, SOB now, resting comfortably.  O- VS- 122/- 18- 100.5 R- 97% 2L.   Gen- Pt. in NAD, A+O x3  S1S2 ausc.  Lungs- clear bilat.  Abd- soft, nontender  Ext- no edema  A- Tachycardia- likely symptomatic anemia     -PNA  P- Stat CBC, BMP    - continue to monitor closely

## 2020-01-27 NOTE — CHART NOTE - NSCHARTNOTEFT_GEN_A_CORE
Medicine PA-                       7.8    12.78 )-----------( 665      ( 27 Jan 2020 05:58 )             25.9   Pt. resting, VSS, hmglb. same as baseline, but type and cross ordered/ pending for potential transfusion symptomatic anemia.

## 2020-01-27 NOTE — PHYSICAL THERAPY INITIAL EVALUATION ADULT - CRITERIA FOR SKILLED THERAPEUTIC INTERVENTIONS
predicted duration of therapy intervention/anticipated equipment needs at discharge/anticipated discharge recommendation/impairments found/therapy frequency/functional limitations in following categories

## 2020-01-27 NOTE — PROGRESS NOTE ADULT - ATTENDING COMMENTS
I have seen and examined the patient and agree with the above assessment and plan. Symptomatic anemia; 1 unit of PRBC ordered. Will give IV lasix post transfusion. Beta-blocker increased. PT - recommending home PT. Wean O2 as tolerated (not on home O2). Repeat labs in AM. Rest of management as outlined above.    Vitals stable  Physical exam   General - elderly female, laying in bed, NAD  HEENT - MMM  CVS - Tachycardic, + S1/S2  Resp - coarse breath sounds  GI - soft, nontender

## 2020-01-27 NOTE — PHYSICAL THERAPY INITIAL EVALUATION ADULT - ADDITIONAL COMMENTS
Pt reports needing assist from family PTA, ambulates without device, however, holds onto family or furniture for safety. Daughters assist with shopping, cooking etc. Pt owns no DME

## 2020-01-28 LAB
ANION GAP SERPL CALC-SCNC: 18 MMOL/L — HIGH (ref 5–17)
BASOPHILS # BLD AUTO: 0.05 K/UL — SIGNIFICANT CHANGE UP (ref 0–0.2)
BASOPHILS NFR BLD AUTO: 0.2 % — SIGNIFICANT CHANGE UP (ref 0–2)
BUN SERPL-MCNC: 15 MG/DL — SIGNIFICANT CHANGE UP (ref 8–20)
CALCIUM SERPL-MCNC: 7.2 MG/DL — LOW (ref 8.6–10.2)
CHLORIDE SERPL-SCNC: 88 MMOL/L — LOW (ref 98–107)
CO2 SERPL-SCNC: 26 MMOL/L — SIGNIFICANT CHANGE UP (ref 22–29)
CREAT SERPL-MCNC: 0.62 MG/DL — SIGNIFICANT CHANGE UP (ref 0.5–1.3)
EOSINOPHIL # BLD AUTO: 0.3 K/UL — SIGNIFICANT CHANGE UP (ref 0–0.5)
EOSINOPHIL NFR BLD AUTO: 1.5 % — SIGNIFICANT CHANGE UP (ref 0–6)
GLUCOSE BLDC GLUCOMTR-MCNC: 116 MG/DL — HIGH (ref 70–99)
GLUCOSE BLDC GLUCOMTR-MCNC: 145 MG/DL — HIGH (ref 70–99)
GLUCOSE BLDC GLUCOMTR-MCNC: 164 MG/DL — HIGH (ref 70–99)
GLUCOSE BLDC GLUCOMTR-MCNC: 291 MG/DL — HIGH (ref 70–99)
GLUCOSE SERPL-MCNC: 126 MG/DL — HIGH (ref 70–99)
HCT VFR BLD CALC: 29.5 % — LOW (ref 34.5–45)
HGB BLD-MCNC: 9.1 G/DL — LOW (ref 11.5–15.5)
IMM GRANULOCYTES NFR BLD AUTO: 1.2 % — SIGNIFICANT CHANGE UP (ref 0–1.5)
LYMPHOCYTES # BLD AUTO: 0.75 K/UL — LOW (ref 1–3.3)
LYMPHOCYTES # BLD AUTO: 3.7 % — LOW (ref 13–44)
MAGNESIUM SERPL-MCNC: 1.5 MG/DL — LOW (ref 1.6–2.6)
MCHC RBC-ENTMCNC: 24.1 PG — LOW (ref 27–34)
MCHC RBC-ENTMCNC: 30.8 GM/DL — LOW (ref 32–36)
MCV RBC AUTO: 78 FL — LOW (ref 80–100)
MONOCYTES # BLD AUTO: 0.49 K/UL — SIGNIFICANT CHANGE UP (ref 0–0.9)
MONOCYTES NFR BLD AUTO: 2.4 % — SIGNIFICANT CHANGE UP (ref 2–14)
NEUTROPHILS # BLD AUTO: 18.33 K/UL — HIGH (ref 1.8–7.4)
NEUTROPHILS NFR BLD AUTO: 91 % — HIGH (ref 43–77)
OB PNL STL: NEGATIVE — SIGNIFICANT CHANGE UP
PHOSPHATE SERPL-MCNC: 2.2 MG/DL — LOW (ref 2.4–4.7)
PLATELET # BLD AUTO: 679 K/UL — HIGH (ref 150–400)
POTASSIUM SERPL-MCNC: 3.8 MMOL/L — SIGNIFICANT CHANGE UP (ref 3.5–5.3)
POTASSIUM SERPL-SCNC: 3.8 MMOL/L — SIGNIFICANT CHANGE UP (ref 3.5–5.3)
RBC # BLD: 3.78 M/UL — LOW (ref 3.8–5.2)
RBC # FLD: 18.4 % — HIGH (ref 10.3–14.5)
SODIUM SERPL-SCNC: 132 MMOL/L — LOW (ref 135–145)
WBC # BLD: 20.16 K/UL — HIGH (ref 3.8–10.5)
WBC # FLD AUTO: 20.16 K/UL — HIGH (ref 3.8–10.5)

## 2020-01-28 PROCEDURE — 99233 SBSQ HOSP IP/OBS HIGH 50: CPT

## 2020-01-28 PROCEDURE — 93010 ELECTROCARDIOGRAM REPORT: CPT

## 2020-01-28 PROCEDURE — 99222 1ST HOSP IP/OBS MODERATE 55: CPT

## 2020-01-28 RX ORDER — BISOPROLOL FUMARATE 10 MG/1
10 TABLET, FILM COATED ORAL DAILY
Refills: 0 | Status: DISCONTINUED | OUTPATIENT
Start: 2020-01-28 | End: 2020-02-04

## 2020-01-28 RX ORDER — FERROUS SULFATE 325(65) MG
325 TABLET ORAL DAILY
Refills: 0 | Status: DISCONTINUED | OUTPATIENT
Start: 2020-01-28 | End: 2020-02-04

## 2020-01-28 RX ORDER — MAGNESIUM SULFATE 500 MG/ML
2 VIAL (ML) INJECTION ONCE
Refills: 0 | Status: COMPLETED | OUTPATIENT
Start: 2020-01-28 | End: 2020-01-28

## 2020-01-28 RX ORDER — ADALIMUMAB 40MG/0.8ML
0 KIT SUBCUTANEOUS
Qty: 0 | Refills: 0 | DISCHARGE

## 2020-01-28 RX ORDER — UPADACITINIB 45 MG/1
1 TABLET, EXTENDED RELEASE ORAL
Qty: 0 | Refills: 0 | DISCHARGE

## 2020-01-28 RX ORDER — BISOPROLOL FUMARATE AND HYDROCHLOROTHIAZIDE 5; 6.25 MG/1; MG/1
1 TABLET ORAL
Qty: 0 | Refills: 0 | DISCHARGE

## 2020-01-28 RX ORDER — AZITHROMYCIN 500 MG/1
500 TABLET, FILM COATED ORAL DAILY
Refills: 0 | Status: DISCONTINUED | OUTPATIENT
Start: 2020-01-28 | End: 2020-01-29

## 2020-01-28 RX ORDER — POTASSIUM CHLORIDE 20 MEQ
20 PACKET (EA) ORAL ONCE
Refills: 0 | Status: COMPLETED | OUTPATIENT
Start: 2020-01-28 | End: 2020-01-28

## 2020-01-28 RX ORDER — METOPROLOL TARTRATE 50 MG
2.5 TABLET ORAL ONCE
Refills: 0 | Status: COMPLETED | OUTPATIENT
Start: 2020-01-28 | End: 2020-01-28

## 2020-01-28 RX ORDER — METFORMIN HYDROCHLORIDE 850 MG/1
1 TABLET ORAL
Qty: 0 | Refills: 0 | DISCHARGE

## 2020-01-28 RX ORDER — LACTULOSE 10 G/15ML
20 SOLUTION ORAL ONCE
Refills: 0 | Status: COMPLETED | OUTPATIENT
Start: 2020-01-28 | End: 2020-01-28

## 2020-01-28 RX ORDER — TRAMADOL HYDROCHLORIDE 50 MG/1
25 TABLET ORAL ONCE
Refills: 0 | Status: DISCONTINUED | OUTPATIENT
Start: 2020-01-28 | End: 2020-01-28

## 2020-01-28 RX ADMIN — Medication 50 GRAM(S): at 11:09

## 2020-01-28 RX ADMIN — Medication 650 MILLIGRAM(S): at 22:38

## 2020-01-28 RX ADMIN — Medication 40 MILLIGRAM(S): at 05:23

## 2020-01-28 RX ADMIN — Medication 50 MILLIGRAM(S): at 01:13

## 2020-01-28 RX ADMIN — TRAMADOL HYDROCHLORIDE 25 MILLIGRAM(S): 50 TABLET ORAL at 11:10

## 2020-01-28 RX ADMIN — Medication 20 MILLIEQUIVALENT(S): at 17:08

## 2020-01-28 RX ADMIN — Medication 2: at 12:48

## 2020-01-28 RX ADMIN — ATORVASTATIN CALCIUM 20 MILLIGRAM(S): 80 TABLET, FILM COATED ORAL at 21:42

## 2020-01-28 RX ADMIN — Medication 650 MILLIGRAM(S): at 21:41

## 2020-01-28 RX ADMIN — AZITHROMYCIN 500 MILLIGRAM(S): 500 TABLET, FILM COATED ORAL at 17:04

## 2020-01-28 RX ADMIN — POLYETHYLENE GLYCOL 3350 17 GRAM(S): 17 POWDER, FOR SOLUTION ORAL at 12:48

## 2020-01-28 RX ADMIN — LACTULOSE 20 GRAM(S): 10 SOLUTION ORAL at 15:55

## 2020-01-28 RX ADMIN — BISOPROLOL FUMARATE 10 MILLIGRAM(S): 10 TABLET, FILM COATED ORAL at 09:53

## 2020-01-28 RX ADMIN — TRAMADOL HYDROCHLORIDE 25 MILLIGRAM(S): 50 TABLET ORAL at 10:08

## 2020-01-28 RX ADMIN — Medication 650 MILLIGRAM(S): at 06:28

## 2020-01-28 RX ADMIN — Medication 1 TABLET(S): at 05:23

## 2020-01-28 RX ADMIN — Medication 5 MILLIGRAM(S): at 05:23

## 2020-01-28 RX ADMIN — Medication 325 MILLIGRAM(S): at 15:56

## 2020-01-28 RX ADMIN — Medication 50 MILLIGRAM(S): at 10:00

## 2020-01-28 RX ADMIN — Medication 1 TABLET(S): at 17:04

## 2020-01-28 RX ADMIN — Medication 62.5 MILLIMOLE(S): at 20:50

## 2020-01-28 RX ADMIN — Medication 20 MILLIEQUIVALENT(S): at 12:54

## 2020-01-28 RX ADMIN — Medication 25 MILLIGRAM(S): at 03:34

## 2020-01-28 RX ADMIN — Medication 50 MILLIGRAM(S): at 22:32

## 2020-01-28 RX ADMIN — Medication 50 GRAM(S): at 17:07

## 2020-01-28 RX ADMIN — Medication 2.5 MILLIGRAM(S): at 06:19

## 2020-01-28 RX ADMIN — Medication 50 MILLIGRAM(S): at 15:55

## 2020-01-28 RX ADMIN — Medication 650 MILLIGRAM(S): at 07:20

## 2020-01-28 NOTE — PROGRESS NOTE ADULT - SUBJECTIVE AND OBJECTIVE BOX
CHIEF COMPLAINT/INTERVAL HISTORY:    Patient is a 64y old  Female who presents with a chief complaint of Difficulty breathing (28 Jan 2020 12:22)      HPI:  The patient is a 64 year old female with a history of rheumatoid arthritis on DMARD agents and prednisone, diabetes mellitus type 2, hypertension and chronic iron deficiency anemia who was referred to the ER by her PMD for complaints of difficulty breathing. According to the patient, she was admitted to [facility] from 12/17-12/20 for anemia. She was transfused 2 units of PRBC and discharged. SInce being discharged from the hospital she has had complaints of progressively worsening dyspnea on exertion associated with lower extremity edema. She was evaluated by her PMD today who referred her to the ER for evaluation. In the ER, noted to have SPo2 of 91% on room air improved with NC. Labs positive for a BNP of 1079 and troponin of 0.10. Patient denies complaints of chest pain, had stress test 3 months ago which she states was normal. Chest xray consistent with cardiomegaly and pulmonary edema. Admitted for acute CHF. (24 Jan 2020 16:34)      SUBJECTIVE & OBJECTIVE: Pt seen and examined at bedside.     ICU Vital Signs Last 24 Hrs  T(C): 36.9 (28 Jan 2020 16:20), Max: 37.6 (28 Jan 2020 08:26)  T(F): 98.5 (28 Jan 2020 16:20), Max: 99.6 (28 Jan 2020 08:26)  HR: 118 (28 Jan 2020 16:20) (118 - 137)  BP: 104/62 (28 Jan 2020 16:20) (100/60 - 133/75)  BP(mean): --  ABP: --  ABP(mean): --  RR: 19 (28 Jan 2020 16:20) (17 - 19)  SpO2: 100% (28 Jan 2020 16:20) (96% - 100%)        MEDICATIONS  (STANDING):  atorvastatin 20 milliGRAM(s) Oral at bedtime  azithromycin   Tablet 500 milliGRAM(s) Oral daily  aztreonam  IVPB      aztreonam  IVPB 1000 milliGRAM(s) IV Intermittent every 8 hours  bisoprolol   Tablet 10 milliGRAM(s) Oral daily  calcium carbonate    500 mG (Tums) Chewable 1 Tablet(s) Chew two times a day  dextrose 5%. 1000 milliLiter(s) (50 mL/Hr) IV Continuous <Continuous>  dextrose 50% Injectable 12.5 Gram(s) IV Push once  dextrose 50% Injectable 25 Gram(s) IV Push once  dextrose 50% Injectable 25 Gram(s) IV Push once  ferrous    sulfate 325 milliGRAM(s) Oral daily  furosemide    Tablet 40 milliGRAM(s) Oral daily  insulin lispro (HumaLOG) corrective regimen sliding scale   SubCutaneous three times a day before meals  magnesium sulfate  IVPB 2 Gram(s) IV Intermittent once  polyethylene glycol 3350 17 Gram(s) Oral daily  potassium chloride    Tablet ER 20 milliEquivalent(s) Oral daily  potassium chloride    Tablet ER 20 milliEquivalent(s) Oral once  predniSONE   Tablet 5 milliGRAM(s) Oral daily  sodium phosphate IVPB 15 milliMole(s) IV Intermittent once    MEDICATIONS  (PRN):  acetaminophen   Tablet .. 650 milliGRAM(s) Oral every 6 hours PRN Mild Pain (1 - 3)  acetaminophen   Tablet .. 650 milliGRAM(s) Oral every 6 hours PRN Temp greater or equal to 38C (100.4F)  albuterol/ipratropium for Nebulization 3 milliLiter(s) Nebulizer every 6 hours PRN Shortness of Breath and/or Wheezing  benzocaine 15 mG/menthol 3.6 mG (Sugar-Free) Lozenge 1 Lozenge Oral every 6 hours PRN Sore Throat  dextrose 40% Gel 15 Gram(s) Oral once PRN Blood Glucose LESS THAN 70 milliGRAM(s)/deciliter  glucagon  Injectable 1 milliGRAM(s) IntraMuscular once PRN Glucose LESS THAN 70 milligrams/deciliter      LABS:                        9.1    20.16 )-----------( 679      ( 28 Jan 2020 08:30 )             29.5     01-28    132<L>  |  88<L>  |  15.0  ----------------------------<  126<H>  3.8   |  26.0  |  0.62    Ca    7.2<L>      28 Jan 2020 08:30  Phos  2.2     01-28  Mg     1.5     01-28            CAPILLARY BLOOD GLUCOSE      POCT Blood Glucose.: 116 mg/dL (28 Jan 2020 17:01)  POCT Blood Glucose.: 164 mg/dL (28 Jan 2020 12:41)  POCT Blood Glucose.: 145 mg/dL (28 Jan 2020 08:28)  POCT Blood Glucose.: 109 mg/dL (27 Jan 2020 21:08)      RECENT CULTURES:      RADIOLOGY & ADDITIONAL TESTS:      PHYSICAL EXAM:    GENERAL: NAD, well-groomed, well-developed  HEAD:  Atraumatic, Normocephalic  EYES: EOMI, PERRLA, conjunctiva and sclera clear  ENMT: Moist mucous membranes  NECK: Supple, No JVD  NERVOUS SYSTEM:  Alert & Oriented X3, Motor Strength 5/5 B/L upper and lower extremities; DTRs 2+ intact and symmetric  CHEST/LUNG: Clear to auscultation bilaterally; No rales, rhonchi, wheezing, or rubs  HEART: Regular rate and rhythm; No murmurs, rubs, or gallops  ABDOMEN: Soft, Nontender, Nondistended; Bowel sounds present  EXTREMITIES:  2+ Peripheral Pulses, No clubbing, cyanosis, or edema        DVT/GI ppx  Discussed with pt @ bedside CHIEF COMPLAINT/INTERVAL HISTORY:    Patient is a 64y old  Female who presents with a chief complaint of Difficulty breathing (28 Jan 2020 12:22)    SUBJECTIVE & OBJECTIVE: Pt seen and examined at bedside. Tachycardic overnight; extra doses of beta-blocker given. OOB to chair today; in better spirits and reports feeling better. No further palpitations; bisoprolol resumed. Cardio follow up. Leukocytosis noted; ID consulted. Azithromycin added.    ROS: No chest pain, SOB, light headedness, dizziness, headache, nausea/vomiting, fevers/chills, abdominal pain, dysuria.    ICU Vital Signs Last 24 Hrs  T(C): 36.9 (28 Jan 2020 16:20), Max: 37.6 (28 Jan 2020 08:26)  T(F): 98.5 (28 Jan 2020 16:20), Max: 99.6 (28 Jan 2020 08:26)  HR: 118 (28 Jan 2020 16:20) (118 - 137)  BP: 104/62 (28 Jan 2020 16:20) (100/60 - 133/75)  RR: 19 (28 Jan 2020 16:20) (17 - 19)  SpO2: 100% (28 Jan 2020 16:20) (96% - 100%)    MEDICATIONS  (STANDING):  atorvastatin 20 milliGRAM(s) Oral at bedtime  azithromycin   Tablet 500 milliGRAM(s) Oral daily  aztreonam  IVPB      aztreonam  IVPB 1000 milliGRAM(s) IV Intermittent every 8 hours  bisoprolol   Tablet 10 milliGRAM(s) Oral daily  calcium carbonate    500 mG (Tums) Chewable 1 Tablet(s) Chew two times a day  dextrose 5%. 1000 milliLiter(s) (50 mL/Hr) IV Continuous <Continuous>  dextrose 50% Injectable 12.5 Gram(s) IV Push once  dextrose 50% Injectable 25 Gram(s) IV Push once  dextrose 50% Injectable 25 Gram(s) IV Push once  ferrous    sulfate 325 milliGRAM(s) Oral daily  furosemide    Tablet 40 milliGRAM(s) Oral daily  insulin lispro (HumaLOG) corrective regimen sliding scale   SubCutaneous three times a day before meals  magnesium sulfate  IVPB 2 Gram(s) IV Intermittent once  polyethylene glycol 3350 17 Gram(s) Oral daily  potassium chloride    Tablet ER 20 milliEquivalent(s) Oral daily  potassium chloride    Tablet ER 20 milliEquivalent(s) Oral once  predniSONE   Tablet 5 milliGRAM(s) Oral daily  sodium phosphate IVPB 15 milliMole(s) IV Intermittent once    MEDICATIONS  (PRN):  acetaminophen   Tablet .. 650 milliGRAM(s) Oral every 6 hours PRN Mild Pain (1 - 3)  acetaminophen   Tablet .. 650 milliGRAM(s) Oral every 6 hours PRN Temp greater or equal to 38C (100.4F)  albuterol/ipratropium for Nebulization 3 milliLiter(s) Nebulizer every 6 hours PRN Shortness of Breath and/or Wheezing  benzocaine 15 mG/menthol 3.6 mG (Sugar-Free) Lozenge 1 Lozenge Oral every 6 hours PRN Sore Throat  dextrose 40% Gel 15 Gram(s) Oral once PRN Blood Glucose LESS THAN 70 milliGRAM(s)/deciliter  glucagon  Injectable 1 milliGRAM(s) IntraMuscular once PRN Glucose LESS THAN 70 milligrams/deciliter      LABS:                        9.1    20.16 )-----------( 679      ( 28 Jan 2020 08:30 )             29.5     01-28    132<L>  |  88<L>  |  15.0  ----------------------------<  126<H>  3.8   |  26.0  |  0.62    Ca    7.2<L>      28 Jan 2020 08:30  Phos  2.2     01-28  Mg     1.5     01-28            CAPILLARY BLOOD GLUCOSE      POCT Blood Glucose.: 116 mg/dL (28 Jan 2020 17:01)  POCT Blood Glucose.: 164 mg/dL (28 Jan 2020 12:41)  POCT Blood Glucose.: 145 mg/dL (28 Jan 2020 08:28)  POCT Blood Glucose.: 109 mg/dL (27 Jan 2020 21:08)    PHYSICAL EXAM:    GENERAL: elderly female, sitting in chair, NAD  HEAD:  Atraumatic, Normocephalic  EYES: EOMI, PERRLA, conjunctiva and sclera clear  ENMT: Moist mucous membranes  NECK: Supple   NERVOUS SYSTEM:  Alert & Oriented X3   CHEST/LUNG: coarse breath sounds  HEART: Tachycardic,  + S1/S2  ABDOMEN: Soft, Nontender, Nondistended; Bowel sounds present  EXTREMITIES: no pedal edema

## 2020-01-28 NOTE — PROGRESS NOTE ADULT - NSHPATTENDINGPLANDISCUSS_GEN_ALL_CORE
patient, daughter at bedside, RN
patient, family at bedside, Dr. Hicks, RN, NP
patient, daughter at bedside, RN, NP

## 2020-01-28 NOTE — CONSULT NOTE ADULT - SUBJECTIVE AND OBJECTIVE BOX
[facility]  INFECTIOUS DISEASES AND INTERNAL MEDICINE at [place]  =======================================================  Darrel Ruffin MD  Diplomates American Board of Internal Medicine and Infectious Diseases  =======================================================    [id number]  MELY RILEY     CC: Difficulty breathing     HPI:  65 y/o woman with PMH of rheumatoid arthritis on DMARDs and prednisone, DM2, HTN and chronic iron deficiency anemia s/p admission to [facility] last month 12/17 to 12/20 for transfusion was admitted on 1/24 with SOB.  Chest CT showed bilateral opacities, so she was started on Aztreonam (PCN allergy) and one dose vancomycin given. Clinically she is improving but since leukocytosis got worse, ID was called for recommendation.   No fever, has mild cough, no chest pain, SOB is better.     PAST MEDICAL & SURGICAL HISTORY:  Anemia  Rheumatoid arthritis  Essential hypertension  No significant past surgical history    Social Hx: no smoking, ETOH or drugs.     FAMILY HISTORY:  No pertinent family history in first degree relatives    Allergies  aspirin (Stomach Upset; Nausea)  penicillin (Angioedema)    Antibiotics:    aztreonam  IVPB 1000 milliGRAM(s) IV Intermittent every 8 hours     REVIEW OF SYSTEMS:  CONSTITUTIONAL:  No Fever or chills  HEENT:  No diplopia or blurred vision.  No sore throat or runny nose.  CARDIOVASCULAR:  No chest pain or SOB.  RESPIRATORY:  No cough, shortness of breath, PND or orthopnea.  GASTROINTESTINAL:  No nausea, vomiting or diarrhea.  GENITOURINARY:  No dysuria, frequency or urgency. No Blood in urine  MUSCULOSKELETAL:  no joint aches, no muscle pain  SKIN:  No change in skin, hair or nails.  NEUROLOGIC:  No paresthesias, fasciculations, seizures or weakness.  PSYCHIATRIC:  No disorder of thought or mood.  ENDOCRINE:  No heat or cold intolerance, polyuria or polydipsia.  HEMATOLOGICAL:  No easy bruising or bleeding.     Physical Exam:  Vital Signs Last 24 Hrs  T(C): 37.6 (28 Jan 2020 08:26), Max: 37.6 (28 Jan 2020 08:26)  T(F): 99.6 (28 Jan 2020 08:26), Max: 99.6 (28 Jan 2020 08:26)  HR: 131 (28 Jan 2020 09:52) (110 - 137)  BP: 113/61 (28 Jan 2020 09:52) (100/60 - 133/75)  BP(mean): --  RR: 17 (28 Jan 2020 08:26) (17 - 18)  SpO2: 96% (28 Jan 2020 08:26) (95% - 98%)  GEN: NAD  HEENT: normocephalic and atraumatic. EOMI. PERRL.    NECK: Supple.  No lymphadenopathy   LUNGS: scattered rhonchi   HEART: Regular rate and rhythm   ABDOMEN: Soft, nontender, and nondistended.  Positive bowel sounds.    : No CVA tenderness  EXTREMITIES: no edema, joint changes due to RA   NEUROLOGIC: grossly intact.  PSYCHIATRIC: Appropriate affect .  SKIN: No ulceration or induration present.    Labs:  01-28    132<L>  |  88<L>  |  15.0  ----------------------------<  126<H>  3.8   |  26.0  |  0.62    Ca    7.2<L>      28 Jan 2020 08:30  Phos  2.2     01-28  Mg     1.5     01-28                        9.1    20.16 )-----------( 679      ( 28 Jan 2020 08:30 )             29.5     RECENT CULTURES:  01-24 @ 18:19      NotDete    All imaging and other data have been reviewed.  < from: CT Angio Chest w/ IV Cont (01.25.20 @ 21:25) >   EXAM:  CT ANGIO CHEST (W)AW IC                        PROCEDURE DATE:  01/25/2020    INTERPRETATION:  PROCEDURE INFORMATION:   Exam: CT Angiography Chest With Contrast   Exam date and time: 1/25/2020 9:19 PM   Age: 64 years old   Clinical indication: Pain; Angina pectoris   TECHNIQUE:   Imaging protocol: Computed tomographic angiography of the chest with   intravenous contrast.   3D rendering: MIP and/or 3D reconstructed images were created by the   technologist.   Contrast material:[id number]; Contrast volume: 48 ml; Contrast route: IV;    COMPARISON:   CR XR CHEST IMMEDIATE 1/24/2020 3:26 PM   FINDINGS:   Pulmonary arteries: Normal. No pulmonary emboli.   Aorta: Atherosclerotic aorta. No aneurysm or acute aortic syndrome. Coronary artery calcifications.  Lungs: Multifocal irregular consolidations and distributed throughout the lungs bilaterally.   Pleural space: Unremarkable. No pneumothorax. No pleural effusion.   Heart: Cardiomegaly. Small pericardial effusion.   Mediastinum: Esophagus is unremarkable.   Lymph nodes: Unremarkable. No enlarged lymph nodes.   Bones/joints: Healing fracture of the posterolateral left seventh rib.   Soft tissues: Unremarkable.   IMPRESSION:   1. Small pericardial effusion.   2. Multifocal irregular consolidations bilaterally compatible with pneumonia.      Assessment and Plan:   65 y/o woman with PMH of rheumatoid arthritis on DMARDs and prednisone, DM2, HTN and chronic iron deficiency anemia s/p admission to [facility] last month 12/17 to 12/20 for transfusion was admitted on 1/24 with SOB.  Chest CT showed bilateral opacities, so she was started on Aztreonam (PCN allergy) and one dose vancomycin given. Since clinically improving, I don't beive that worsening of leukocytosis is related to worsening of pneumonia. Also on low dose prednisone.     Pneumonia  RA-On chronic low dose prednisone immunocompromised    - RVP negative  - Trend WBC and Tm  - CT result noted.   - Will send procalcitonin and legionella urinary ag  - Continue aztreonam 1gm q8h  - Will add Azithromycin 500mg daily po  - Can hold on Vancomycin for now.     Will follow.    Case discussed with Dr. Beltre.

## 2020-01-28 NOTE — CHART NOTE - NSCHARTNOTEFT_GEN_A_CORE
Called by RN to report sinus tachycardia on monitor to rate of 137, manual 136 @ around 3:30am. Instructed RN to give 25mg PO metoprolol early as her dose was just increased earlier the previous day. After this dose pt HR improved however now called again by RN around 6:15am to report pt tachycardic to 140s on monitor. One time dose 2.5mg IV push lopressor ordered. All other vital signs stable. Denies chest pain, SOB, dizziness, light headedness, fevers, chills. Patient has been tachycardic throughout this admission 2/2 anemia. Follow am labs. Continue to monitor.

## 2020-01-29 LAB
ANION GAP SERPL CALC-SCNC: 11 MMOL/L — SIGNIFICANT CHANGE UP (ref 5–17)
BASOPHILS # BLD AUTO: 0.07 K/UL — SIGNIFICANT CHANGE UP (ref 0–0.2)
BASOPHILS NFR BLD AUTO: 0.3 % — SIGNIFICANT CHANGE UP (ref 0–2)
BUN SERPL-MCNC: 15 MG/DL — SIGNIFICANT CHANGE UP (ref 8–20)
CALCIUM SERPL-MCNC: 7.5 MG/DL — LOW (ref 8.6–10.2)
CHLORIDE SERPL-SCNC: 89 MMOL/L — LOW (ref 98–107)
CO2 SERPL-SCNC: 29 MMOL/L — SIGNIFICANT CHANGE UP (ref 22–29)
CREAT SERPL-MCNC: 0.55 MG/DL — SIGNIFICANT CHANGE UP (ref 0.5–1.3)
EOSINOPHIL # BLD AUTO: 0.49 K/UL — SIGNIFICANT CHANGE UP (ref 0–0.5)
EOSINOPHIL NFR BLD AUTO: 2.4 % — SIGNIFICANT CHANGE UP (ref 0–6)
GLUCOSE BLDC GLUCOMTR-MCNC: 123 MG/DL — HIGH (ref 70–99)
GLUCOSE BLDC GLUCOMTR-MCNC: 161 MG/DL — HIGH (ref 70–99)
GLUCOSE BLDC GLUCOMTR-MCNC: 179 MG/DL — HIGH (ref 70–99)
GLUCOSE BLDC GLUCOMTR-MCNC: 96 MG/DL — SIGNIFICANT CHANGE UP (ref 70–99)
GLUCOSE SERPL-MCNC: 155 MG/DL — HIGH (ref 70–99)
HCT VFR BLD CALC: 29.4 % — LOW (ref 34.5–45)
HGB BLD-MCNC: 8.9 G/DL — LOW (ref 11.5–15.5)
IMM GRANULOCYTES NFR BLD AUTO: 1 % — SIGNIFICANT CHANGE UP (ref 0–1.5)
LYMPHOCYTES # BLD AUTO: 0.8 K/UL — LOW (ref 1–3.3)
LYMPHOCYTES # BLD AUTO: 3.9 % — LOW (ref 13–44)
MAGNESIUM SERPL-MCNC: 2.4 MG/DL — SIGNIFICANT CHANGE UP (ref 1.6–2.6)
MCHC RBC-ENTMCNC: 23.6 PG — LOW (ref 27–34)
MCHC RBC-ENTMCNC: 30.3 GM/DL — LOW (ref 32–36)
MCV RBC AUTO: 78 FL — LOW (ref 80–100)
MONOCYTES # BLD AUTO: 0.46 K/UL — SIGNIFICANT CHANGE UP (ref 0–0.9)
MONOCYTES NFR BLD AUTO: 2.2 % — SIGNIFICANT CHANGE UP (ref 2–14)
NEUTROPHILS # BLD AUTO: 18.48 K/UL — HIGH (ref 1.8–7.4)
NEUTROPHILS NFR BLD AUTO: 90.2 % — HIGH (ref 43–77)
PHOSPHATE SERPL-MCNC: 2.9 MG/DL — SIGNIFICANT CHANGE UP (ref 2.4–4.7)
PLATELET # BLD AUTO: 695 K/UL — HIGH (ref 150–400)
POTASSIUM SERPL-MCNC: 4 MMOL/L — SIGNIFICANT CHANGE UP (ref 3.5–5.3)
POTASSIUM SERPL-SCNC: 4 MMOL/L — SIGNIFICANT CHANGE UP (ref 3.5–5.3)
PROCALCITONIN SERPL-MCNC: 0.15 NG/ML — HIGH (ref 0.02–0.1)
RBC # BLD: 3.77 M/UL — LOW (ref 3.8–5.2)
RBC # FLD: 18.2 % — HIGH (ref 10.3–14.5)
SODIUM SERPL-SCNC: 129 MMOL/L — LOW (ref 135–145)
WBC # BLD: 20.51 K/UL — HIGH (ref 3.8–10.5)
WBC # FLD AUTO: 20.51 K/UL — HIGH (ref 3.8–10.5)

## 2020-01-29 PROCEDURE — 99232 SBSQ HOSP IP/OBS MODERATE 35: CPT

## 2020-01-29 PROCEDURE — 99233 SBSQ HOSP IP/OBS HIGH 50: CPT

## 2020-01-29 RX ADMIN — Medication 20 MILLIEQUIVALENT(S): at 12:25

## 2020-01-29 RX ADMIN — Medication 2: at 12:24

## 2020-01-29 RX ADMIN — Medication 1 TABLET(S): at 05:22

## 2020-01-29 RX ADMIN — AZITHROMYCIN 500 MILLIGRAM(S): 500 TABLET, FILM COATED ORAL at 12:25

## 2020-01-29 RX ADMIN — BISOPROLOL FUMARATE 10 MILLIGRAM(S): 10 TABLET, FILM COATED ORAL at 05:22

## 2020-01-29 RX ADMIN — Medication 650 MILLIGRAM(S): at 06:06

## 2020-01-29 RX ADMIN — POLYETHYLENE GLYCOL 3350 17 GRAM(S): 17 POWDER, FOR SOLUTION ORAL at 12:25

## 2020-01-29 RX ADMIN — Medication 40 MILLIGRAM(S): at 05:22

## 2020-01-29 RX ADMIN — Medication 5 MILLIGRAM(S): at 05:22

## 2020-01-29 RX ADMIN — Medication 325 MILLIGRAM(S): at 12:25

## 2020-01-29 RX ADMIN — Medication 650 MILLIGRAM(S): at 21:47

## 2020-01-29 RX ADMIN — Medication 650 MILLIGRAM(S): at 05:23

## 2020-01-29 RX ADMIN — Medication 50 MILLIGRAM(S): at 05:23

## 2020-01-29 RX ADMIN — Medication 650 MILLIGRAM(S): at 20:47

## 2020-01-29 RX ADMIN — ATORVASTATIN CALCIUM 20 MILLIGRAM(S): 80 TABLET, FILM COATED ORAL at 21:32

## 2020-01-29 RX ADMIN — Medication 1 TABLET(S): at 21:32

## 2020-01-29 NOTE — DIETITIAN INITIAL EVALUATION ADULT. - ETIOLOGY
related to inability to meet sufficient protein-energy in setting of increased needs associated with CHF and decreased appetite/po intake over the last year

## 2020-01-29 NOTE — PROGRESS NOTE ADULT - SUBJECTIVE AND OBJECTIVE BOX
[facility], [facility]                                                    [address], [address], [place] [address]                                                         PHONE: [phone]    FAX: [phone] [address], [address], [place] [address]                                                 PHONE: [phone]    FAX: [phone]  *******************************************************************************  cc: asked to see pt due to sinus tach    HPI: MELY LIN is a 64y Female with HTN, HLD, DM, CAD based on elevated coronary artery calcium score, negative nuclear stress test 19, RA, chronic anemia with recent admission to [facility] 2019 for PRBC transfusion, admitted with hypoxia, SOB and LE edema, mildly elevated troponin.  Acute exacerbation of likely chronic diastolic CHF +/- URI. Pt has had an asymptomatic sinus tachycardia throughout the admission. Pt denies chest pain or SOB. No palpitations reported. No fever, chills or constitutional sx. History provided by patient and daughter at the bedside.      Overnight events/Subjective Assessment: feels improved. Respiratory status improved. Laying flat without respiratory compromise. No SOB. No fever or chills. Asymptomatic    INTERPRETATION OF TELEMETRY (personally reviewed): sinus with HR up to 105. No other sustained arrhythmias    PAST MEDICAL & SURGICAL HISTORY:  Anemia  Rheumatoid arthritis  Essential hypertension  No significant past surgical history    Allergies:  aspirin (Stomach Upset; Nausea)  penicillin (Angioedema)      MEDICATIONS  (STANDING):  atorvastatin 20 milliGRAM(s) Oral at bedtime  azithromycin   Tablet 500 milliGRAM(s) Oral daily  aztreonam  IVPB      aztreonam  IVPB 1000 milliGRAM(s) IV Intermittent every 8 hours  bisoprolol   Tablet 10 milliGRAM(s) Oral daily  calcium carbonate    500 mG (Tums) Chewable 1 Tablet(s) Chew two times a day  dextrose 5%. 1000 milliLiter(s) (50 mL/Hr) IV Continuous <Continuous>  dextrose 50% Injectable 12.5 Gram(s) IV Push once  dextrose 50% Injectable 25 Gram(s) IV Push once  dextrose 50% Injectable 25 Gram(s) IV Push once  ferrous    sulfate 325 milliGRAM(s) Oral daily  furosemide    Tablet 40 milliGRAM(s) Oral daily  insulin lispro (HumaLOG) corrective regimen sliding scale   SubCutaneous three times a day before meals  polyethylene glycol 3350 17 Gram(s) Oral daily  potassium chloride    Tablet ER 20 milliEquivalent(s) Oral daily  predniSONE   Tablet 5 milliGRAM(s) Oral daily    MEDICATIONS  (PRN):  acetaminophen   Tablet .. 650 milliGRAM(s) Oral every 6 hours PRN Mild Pain (1 - 3)  acetaminophen   Tablet .. 650 milliGRAM(s) Oral every 6 hours PRN Temp greater or equal to 38C (100.4F)  albuterol/ipratropium for Nebulization 3 milliLiter(s) Nebulizer every 6 hours PRN Shortness of Breath and/or Wheezing  benzocaine 15 mG/menthol 3.6 mG (Sugar-Free) Lozenge 1 Lozenge Oral every 6 hours PRN Sore Throat  dextrose 40% Gel 15 Gram(s) Oral once PRN Blood Glucose LESS THAN 70 milliGRAM(s)/deciliter  glucagon  Injectable 1 milliGRAM(s) IntraMuscular once PRN Glucose LESS THAN 70 milligrams/deciliter      Vital Signs Last 24 Hrs  T(C): 36.8 (2020 08:17), Max: 37.6 (2020 08:26)  T(F): 98.2 (2020 08:17), Max: 99.6 (2020 08:26)  HR: 106 (2020 08:17) (106 - 133)  BP: 104/53 (2020 08:17) (103/63 - 128/77)  BP(mean): 70 (2020 08:17) (70 - 70)  RR: 18 (2020 08:17) (17 - 19)  SpO2: 98% (2020 08:17) (96% - 100%)    I&O's Detail    I&O's Summary          PHYSICAL EXAM:  General: Appears well developed, well nourished, no acute distress. not in acute pain  HEAD: normal cephalic. Atraumatic  PUPILS: equal and reactive to light  EARS: normal hearing  NECK: supple. no JVD or HJR. no carotid bruits. no visible lymphadenopathy  NOSE: no gross abnormalities  CHEST: symmetric chest wall expansion  CARDIOVASCULAR: Normal rate. Regular rhythm. Normal S1 and S2, no S3/S4,  no murmur, rub, or gallop  LUNGS: Normal effort. Normal respiratory rate. Breath sounds are clear to auscultation bilaterally. No respiratory distress. No stridor.  no rales, rhonchi or wheeze. no decreased Breath sounds  ABDOMEN: Soft, nontender, non-distended, positive bowel sounds, no mass or bruit. no abdominal tenderness. No rebound. no ascites  EXTREMITIES: No clubbing, cyanosis or edema. normal range of motion  PULSES:  distal pulses WNL  SKIN: Warm and dry with normal turgor. no visible rash or cyanosis   NEURO: Alert & oriented x 3, grossly intact with no focal weakness  PSYCH: normal mood and affect. Grossly normal insight and judgement exhibited    FAMILY HISTORY:  No pertinent family history of ischemic heart disease in mother, father or in first degree relatives      SOCIAL HISTORY:  former smoking. No ETOH/No IVDA    REVIEW OF SYSTEMS:  Constitutional: no fever, chills or malaise. No weight loss  Head: no trauma  Eyes: no visual deficit. No double vision  Ears: no hearing deficit or ringing in the ears  Nose: no nose bleeds or smell changes or congestion  Throat: no difficult swallowing or painful swallowing  Neck: supple. No lymphadenopathy or swelling  Respiratory: no SOB, wheeze, asthma, COPD. No cough. No blood in the sputum  Cardiovascular: no CP, palpitations, irregular heart beats. No edema. No PND. No orthopnea. No skin/temperature or color changes  Gastrointestinal: no abdominal pain. No constipation. No diarrhea. No melena. No nausea. No vomiting. No bloating  Genitourinary: no frequency or urgency. No hematuria  Lymphatics: no grossly swollen lymph nodes  Musculoskeletal: no limitation of range of motion. Normal strength. No pain  Integumentary: no visible rash. No itching  Neurologic: no HA. No TIA or stroke symptoms. No seizure. No hx of epilepsy. No tingling or numbness. No weakness. No dizziness  Psychiatric: denied. Reports appropriate mood.        LABS:                        9.1    20.16 )-----------( 679      ( 2020 08:30 )             29.5     01-28    132<L>  |  88<L>  |  15.0  ----------------------------<  126<H>  3.8   |  26.0  |  0.62    Ca    7.2<L>      2020 08:30  Phos  2.2       Mg     1.5                 Serum Pro-Brain Natriuretic Peptide: 1079 pg/mL ( @ 13:30)  serum  Lipids:   Hemoglobin A1C, Whole Blood: 6.3 % ( @ 09:19)    Thyroid Stimulating Hormone, Serum: 1.54 uIU/mL ( @ 05:58)      RADIOLOGY & ADDITIONAL STUDIES:    EC/28 Sinus tach 130    ECHO: < from: TTE Echo Complete w/Doppler (20 @ 09:25) >     PHYSICIAN INTERPRETATION:  Left Ventricle: The left ventricular internal cavity size is normal.  Global LV systolic function was normal. Left ventricular ejection fraction, by visual estimation, is 60 to 65%. Spectral Doppler shows normal pattern of LV diastolic filling.  Right Ventricle: The right ventricular size is normal. RV systolic function is normal.  Left Atrium: Mild to moderately enlarged left atrium. Lipomatous hypertrophy of the intra-atrial septum.  Right Atrium: Normal right atrial size.  Pericardium: Trivial pericardial effusion is present. There is a significant pericardial fat pad present.  Mitral Valve: The mitral valve is normal in structure. Mild thickening of the anterior and posterior mitral valve leaflets. No evidence of mitral stenosis. Mild to moderate mitral valve regurgitation is seen. The MR jet is centrally-directed.  Tricuspid Valve: The tricuspid valve is normal in structure. Trivial tricuspid regurgitation is visualized. Adequate TR velocity was not obtained to accurately assess RVSP.  Aortic Valve: The aortic valve is trileaflet. Mild to moderate aortic stenosis is present. Mild aorticvalve regurgitation is seen.  Pulmonic Valve: Trace pulmonic valve regurgitation.  Aorta: The aortic root and ascending aorta are structurally normal, with no evidence of dilitation.  Venous: The inferior vena cava was normal sized, with respiratory size variation greater than 50%.       Summary:   1. Left ventricular ejection fraction, by visual estimation, is 60 to 65%.   2. Normal global left ventricular systolic function.   3. Normal left ventricular internal cavity size.   4. There is mild concentric left ventricular hypertrophy.   5. Mild to moderately enlarged left atrium.   6. Mild thickening of the anterior and posterior mitral valve leaflets.   7. Mild to moderate mitral valve regurgitation.   8. Mild to moderate aortic valve stenosis.   9. Mild aortic regurgitation.  10. Lipomatous hypertrophy of the intra-atrial septum.  11. Trivial pericardial effusion.    < end of copied text >    < from: CT Angio Chest w/ IV Cont (20 @ 21:25) >  IMPRESSION:   1. Small pericardial effusion.   2. Multifocal irregular consolidations bilaterally compatible with pneumonia.    < end of copied text >    < from: Xray Chest 1 View-PORTABLE IMMEDIATE (20 @ 15:36) >    IMPRESSION: Heart enlargement. Diffuse infiltrates.    < end of copied text >      ASSESSMENT AND PLAN:  In summary, MELY LIN is a 64y Female with past medical history significant for HTN, HLD, DM, CAD based on elevated coronary artery calcium score, negative nuclear stress test 19, RA, chronic anemia with recent admission to [facility] 2019 for PRBC transfusion, admitted with hypoxia, SOB and LE edema, mildly elevated troponin.  s/p acute exacerbation of chroinic diastolic CHF, resolved. CT scan evidence of bilateral PNA. Pt with asymptomatic resting sinus tachycardia with stable hemodynamics    - Sinus tachycardia with stable hemodynamics. Sinus tachycardia is compensatory. Would treat underlying etiology, likely multifactorial. CTA with multifocal irregular consolidations bilaterally compatible with PNA.  Component on azotemia as per BUN/Cr ratio.  ABX as per ID. As asymptomatic sinus tachycardia with stable hemodynamics, no need to aggressively treat sinus tach.  Will continue bisoprolol and lower lasix to 20mg daily.    - TSH normal at 1.54    - Acute on chronic diastolic CHF resolved. Pt laying flat without cardiac sx. No JVD or evidence of cardiac decompensation.    - Elevated Troponin 0.1--0.12-0.12.  Flat trend not consistent with ACS.  Likely represents demand ischemia on presentation.    - CAD/ Known CAD based on elevated coronary artery calcium score with negative nuclear stress test 19.  No chest pain or ischemic EKG changes.  Given anemia requiring recent PRBC transfusion would prefer outpatient nuclear stress test as initial strategy.    - Ideally should be on ASA.  ASA listed in allergies as "upset stomach, nausea."  This is more of an intolerance.  Can retry as an outpt if allowed by hematology.    - PNA. ABX as per medical/ID. elevated WBC may be due to steroids    - Keep K > 4, Mg > 2    - Hx of anemia requiring PRBC.  Monitor H/H    - Echocardiogram 20 EF 60-65%, LAE, mild-mod MR, mild-mod AS, trivial pericardial effusion. Will follow up valvular heart disease as an outpt    - Trivial pericardial effusion without hemodynamic significance. F/u echo as an outpt    - HL. LDL 69, continue Lipitor 20 for now. chol 136, Trig 184, HDL 30, LDL 69    - BCx have been negative    - Telemetry monitoring personally reviewed by me. sinus, sinus tach to  overnight    - ECG personally reviewed by me    - Echocardiogram imaging personally reviewed by me    - radiologic imaging reviewed    - Laboratory data reviewed.    - I have personally reviewed all obtainable prior records and data    - No further inpatient cardiac work up planned.  Routine outpatient follow up in our office after discharge.  Will follow as needed.  Please call with any CV questions or concerns.        Kay Sales MD [facility], [facility]                                                    [address], [address], [place] [address]                                                         PHONE: [phone]    FAX: [phone] [address], [address], [place] [address]                                                 PHONE: [phone]    FAX: [phone]  *******************************************************************************  cc: asked to see pt due to sinus tach    HPI: MELY LIN is a 64y Female with HTN, HLD, DM, CAD based on elevated coronary artery calcium score, negative nuclear stress test 19, RA, chronic anemia with recent admission to [facility] 2019 for PRBC transfusion, admitted with hypoxia, SOB and LE edema, mildly elevated troponin.  Acute exacerbation of likely chronic diastolic CHF +/- URI. Pt has had an asymptomatic sinus tachycardia throughout the admission. Pt denies chest pain or SOB. No palpitations reported. No fever, chills or constitutional sx. History provided by patient and daughter at the bedside.      Overnight events/Subjective Assessment: feels improved. Respiratory status improved. Laying flat without respiratory compromise. No SOB. No fever or chills. Asymptomatic    INTERPRETATION OF TELEMETRY (personally reviewed): sinus with HR up to 105. No other sustained arrhythmias    PAST MEDICAL & SURGICAL HISTORY:  Anemia  Rheumatoid arthritis  Essential hypertension  No significant past surgical history    Allergies:  aspirin (Stomach Upset; Nausea)  penicillin (Angioedema)      MEDICATIONS  (STANDING):  atorvastatin 20 milliGRAM(s) Oral at bedtime  azithromycin   Tablet 500 milliGRAM(s) Oral daily  aztreonam  IVPB      aztreonam  IVPB 1000 milliGRAM(s) IV Intermittent every 8 hours  bisoprolol   Tablet 10 milliGRAM(s) Oral daily  calcium carbonate    500 mG (Tums) Chewable 1 Tablet(s) Chew two times a day  dextrose 5%. 1000 milliLiter(s) (50 mL/Hr) IV Continuous <Continuous>  dextrose 50% Injectable 12.5 Gram(s) IV Push once  dextrose 50% Injectable 25 Gram(s) IV Push once  dextrose 50% Injectable 25 Gram(s) IV Push once  ferrous    sulfate 325 milliGRAM(s) Oral daily  furosemide    Tablet 40 milliGRAM(s) Oral daily  insulin lispro (HumaLOG) corrective regimen sliding scale   SubCutaneous three times a day before meals  polyethylene glycol 3350 17 Gram(s) Oral daily  potassium chloride    Tablet ER 20 milliEquivalent(s) Oral daily  predniSONE   Tablet 5 milliGRAM(s) Oral daily    MEDICATIONS  (PRN):  acetaminophen   Tablet .. 650 milliGRAM(s) Oral every 6 hours PRN Mild Pain (1 - 3)  acetaminophen   Tablet .. 650 milliGRAM(s) Oral every 6 hours PRN Temp greater or equal to 38C (100.4F)  albuterol/ipratropium for Nebulization 3 milliLiter(s) Nebulizer every 6 hours PRN Shortness of Breath and/or Wheezing  benzocaine 15 mG/menthol 3.6 mG (Sugar-Free) Lozenge 1 Lozenge Oral every 6 hours PRN Sore Throat  dextrose 40% Gel 15 Gram(s) Oral once PRN Blood Glucose LESS THAN 70 milliGRAM(s)/deciliter  glucagon  Injectable 1 milliGRAM(s) IntraMuscular once PRN Glucose LESS THAN 70 milligrams/deciliter      Vital Signs Last 24 Hrs  T(C): 36.8 (2020 08:17), Max: 37.6 (2020 08:26)  T(F): 98.2 (2020 08:17), Max: 99.6 (2020 08:26)  HR: 106 (2020 08:17) (106 - 133)  BP: 104/53 (2020 08:17) (103/63 - 128/77)  BP(mean): 70 (2020 08:17) (70 - 70)  RR: 18 (2020 08:17) (17 - 19)  SpO2: 98% (2020 08:17) (96% - 100%)    I&O's Detail    I&O's Summary          PHYSICAL EXAM:  General: Appears well developed, well nourished, no acute distress. not in acute pain  HEAD: normal cephalic. Atraumatic  PUPILS: equal and reactive to light  EARS: normal hearing  NECK: supple. no JVD or HJR. no carotid bruits. no visible lymphadenopathy  NOSE: no gross abnormalities  CHEST: symmetric chest wall expansion  CARDIOVASCULAR: Normal rate. Regular rhythm. Normal S1 and S2, no S3/S4,  no murmur, rub, or gallop  LUNGS: Normal effort. Normal respiratory rate. Breath sounds are clear to auscultation bilaterally. No respiratory distress. No stridor.  no rales, rhonchi or wheeze. no decreased Breath sounds  ABDOMEN: Soft, nontender, non-distended, positive bowel sounds, no mass or bruit. no abdominal tenderness. No rebound. no ascites  EXTREMITIES: No clubbing, cyanosis or edema. normal range of motion  PULSES:  distal pulses WNL  SKIN: Warm and dry with normal turgor. no visible rash or cyanosis   NEURO: Alert & oriented x 3, grossly intact with no focal weakness  PSYCH: normal mood and affect. Grossly normal insight and judgement exhibited    FAMILY HISTORY:  No pertinent family history of ischemic heart disease in mother, father or in first degree relatives      SOCIAL HISTORY:  former smoking. No ETOH/No IVDA    REVIEW OF SYSTEMS:  Constitutional: no fever, chills or malaise. No weight loss  Head: no trauma  Eyes: no visual deficit. No double vision  Ears: no hearing deficit or ringing in the ears  Nose: no nose bleeds or smell changes or congestion  Throat: no difficult swallowing or painful swallowing  Neck: supple. No lymphadenopathy or swelling  Respiratory: no SOB, wheeze, asthma, COPD. No cough. No blood in the sputum  Cardiovascular: no CP, palpitations, irregular heart beats. No edema. No PND. No orthopnea. No skin/temperature or color changes  Gastrointestinal: no abdominal pain. No constipation. No diarrhea. No melena. No nausea. No vomiting. No bloating  Genitourinary: no frequency or urgency. No hematuria  Lymphatics: no grossly swollen lymph nodes  Musculoskeletal: no limitation of range of motion. Normal strength. No pain  Integumentary: no visible rash. No itching  Neurologic: no HA. No TIA or stroke symptoms. No seizure. No hx of epilepsy. No tingling or numbness. No weakness. No dizziness  Psychiatric: denied. Reports appropriate mood.        LABS:                        9.1    20.16 )-----------( 679      ( 2020 08:30 )             29.5     01-28    132<L>  |  88<L>  |  15.0  ----------------------------<  126<H>  3.8   |  26.0  |  0.62    Ca    7.2<L>      2020 08:30  Phos  2.2       Mg     1.5                 Serum Pro-Brain Natriuretic Peptide: 1079 pg/mL ( @ 13:30)  serum  Lipids:   Hemoglobin A1C, Whole Blood: 6.3 % ( @ 09:19)    Thyroid Stimulating Hormone, Serum: 1.54 uIU/mL ( @ 05:58)      RADIOLOGY & ADDITIONAL STUDIES:    EC/28 Sinus tach 130    ECHO: < from: TTE Echo Complete w/Doppler (20 @ 09:25) >     PHYSICIAN INTERPRETATION:  Left Ventricle: The left ventricular internal cavity size is normal.  Global LV systolic function was normal. Left ventricular ejection fraction, by visual estimation, is 60 to 65%. Spectral Doppler shows normal pattern of LV diastolic filling.  Right Ventricle: The right ventricular size is normal. RV systolic function is normal.  Left Atrium: Mild to moderately enlarged left atrium. Lipomatous hypertrophy of the intra-atrial septum.  Right Atrium: Normal right atrial size.  Pericardium: Trivial pericardial effusion is present. There is a significant pericardial fat pad present.  Mitral Valve: The mitral valve is normal in structure. Mild thickening of the anterior and posterior mitral valve leaflets. No evidence of mitral stenosis. Mild to moderate mitral valve regurgitation is seen. The MR jet is centrally-directed.  Tricuspid Valve: The tricuspid valve is normal in structure. Trivial tricuspid regurgitation is visualized. Adequate TR velocity was not obtained to accurately assess RVSP.  Aortic Valve: The aortic valve is trileaflet. Mild to moderate aortic stenosis is present. Mild aorticvalve regurgitation is seen.  Pulmonic Valve: Trace pulmonic valve regurgitation.  Aorta: The aortic root and ascending aorta are structurally normal, with no evidence of dilitation.  Venous: The inferior vena cava was normal sized, with respiratory size variation greater than 50%.       Summary:   1. Left ventricular ejection fraction, by visual estimation, is 60 to 65%.   2. Normal global left ventricular systolic function.   3. Normal left ventricular internal cavity size.   4. There is mild concentric left ventricular hypertrophy.   5. Mild to moderately enlarged left atrium.   6. Mild thickening of the anterior and posterior mitral valve leaflets.   7. Mild to moderate mitral valve regurgitation.   8. Mild to moderate aortic valve stenosis.   9. Mild aortic regurgitation.  10. Lipomatous hypertrophy of the intra-atrial septum.  11. Trivial pericardial effusion.    < end of copied text >    < from: CT Angio Chest w/ IV Cont (20 @ 21:25) >  IMPRESSION:   1. Small pericardial effusion.   2. Multifocal irregular consolidations bilaterally compatible with pneumonia.    < end of copied text >    < from: Xray Chest 1 View-PORTABLE IMMEDIATE (20 @ 15:36) >    IMPRESSION: Heart enlargement. Diffuse infiltrates.    < end of copied text >      ASSESSMENT AND PLAN:  In summary, MELY LIN is a 64y Female with past medical history significant for HTN, HLD, DM, CAD based on elevated coronary artery calcium score, negative nuclear stress test 19, RA, chronic anemia with recent admission to [facility] 2019 for PRBC transfusion, admitted with hypoxia, SOB and LE edema, mildly elevated troponin.  s/p acute exacerbation of chroinic diastolic CHF, resolved. CT scan evidence of bilateral PNA. Pt with asymptomatic resting sinus tachycardia with stable hemodynamics    - Sinus tachycardia with stable hemodynamics. Sinus tachycardia is compensatory. Would treat underlying etiology, likely multifactorial. CTA with multifocal irregular consolidations bilaterally compatible with PNA.  Component on azotemia as per BUN/Cr ratio.  ABX as per ID. As asymptomatic sinus tachycardia with stable hemodynamics, no need to aggressively treat sinus tach.  Will continue bisoprolol and lower lasix to 20mg daily.    - TSH normal at 1.54    - Acute on chronic diastolic CHF resolved. Pt laying flat without cardiac sx. No JVD or evidence of cardiac decompensation.    - Elevated Troponin 0.1--0.12-0.12.  Flat trend not consistent with ACS.  Likely represents demand ischemia on presentation.    - CAD/ Known CAD based on elevated coronary artery calcium score with negative nuclear stress test 19.  No chest pain or ischemic EKG changes.  Given anemia requiring recent PRBC transfusion would prefer outpatient nuclear stress test as initial strategy.    - Ideally should be on ASA.  ASA listed in allergies as "upset stomach, nausea."  This is more of an intolerance.  Can retry as an outpt if allowed by hematology.    - PNA. ABX as per medical/ID. elevated WBC may be due to steroids    - Keep K > 4, Mg > 2    - Hx of anemia requiring PRBC.  Monitor H/H    - Echocardiogram 20 EF 60-65%, LAE, mild-mod MR, mild-mod AS, trivial pericardial effusion. Will follow up valvular heart disease as an outpt    - Trivial pericardial effusion without hemodynamic significance. F/u echo as an outpt    - HL. LDL 69, continue Lipitor 20 for now. chol 136, Trig 184, HDL 30, LDL 69    - BCx have been negative    - on DVT prophylaxis    - RA management as per medical    - Telemetry monitoring personally reviewed by me. sinus, sinus tach to  overnight    - ECG personally reviewed by me    - Echocardiogram imaging personally reviewed by me    - radiologic imaging reviewed    - Laboratory data reviewed.    - I have personally reviewed all obtainable prior records and data    - No further inpatient cardiac work up planned.  Routine outpatient follow up in our office after discharge.  Will follow as needed.  Please call with any CV questions or concerns.        Kay Sales MD [facility], [facility]                                                    [address], [address], [place] [address]                                                         PHONE: [phone]    FAX: [phone] [address], [address], [place] [address]                                                 PHONE: [phone]    FAX: [phone]  *******************************************************************************  cc: asked to see pt due to sinus tach    HPI: MELY LIN is a 64y Female with HTN, HLD, DM, CAD based on elevated coronary artery calcium score, negative nuclear stress test 19, RA, chronic anemia with recent admission to [facility] 2019 for PRBC transfusion, admitted with hypoxia, SOB and LE edema, mildly elevated troponin.  Acute exacerbation of likely chronic diastolic CHF +/- URI. Pt has had an asymptomatic sinus tachycardia throughout the admission. Pt denies chest pain or SOB. No palpitations reported. No fever, chills or constitutional sx. History provided by patient and daughter at the bedside.      Overnight events/Subjective Assessment: feels improved. Respiratory status improved. Laying flat without respiratory compromise. No SOB. No fever or chills. Asymptomatic    INTERPRETATION OF TELEMETRY (personally reviewed): sinus with HR up to 105. No other sustained arrhythmias    PAST MEDICAL & SURGICAL HISTORY:  Anemia  Rheumatoid arthritis  Essential hypertension  No significant past surgical history    Allergies:  aspirin (Stomach Upset; Nausea)  penicillin (Angioedema)      MEDICATIONS  (STANDING):  atorvastatin 20 milliGRAM(s) Oral at bedtime  azithromycin   Tablet 500 milliGRAM(s) Oral daily  aztreonam  IVPB      aztreonam  IVPB 1000 milliGRAM(s) IV Intermittent every 8 hours  bisoprolol   Tablet 10 milliGRAM(s) Oral daily  calcium carbonate    500 mG (Tums) Chewable 1 Tablet(s) Chew two times a day  dextrose 5%. 1000 milliLiter(s) (50 mL/Hr) IV Continuous <Continuous>  dextrose 50% Injectable 12.5 Gram(s) IV Push once  dextrose 50% Injectable 25 Gram(s) IV Push once  dextrose 50% Injectable 25 Gram(s) IV Push once  ferrous    sulfate 325 milliGRAM(s) Oral daily  furosemide    Tablet 40 milliGRAM(s) Oral daily  insulin lispro (HumaLOG) corrective regimen sliding scale   SubCutaneous three times a day before meals  polyethylene glycol 3350 17 Gram(s) Oral daily  potassium chloride    Tablet ER 20 milliEquivalent(s) Oral daily  predniSONE   Tablet 5 milliGRAM(s) Oral daily    MEDICATIONS  (PRN):  acetaminophen   Tablet .. 650 milliGRAM(s) Oral every 6 hours PRN Mild Pain (1 - 3)  acetaminophen   Tablet .. 650 milliGRAM(s) Oral every 6 hours PRN Temp greater or equal to 38C (100.4F)  albuterol/ipratropium for Nebulization 3 milliLiter(s) Nebulizer every 6 hours PRN Shortness of Breath and/or Wheezing  benzocaine 15 mG/menthol 3.6 mG (Sugar-Free) Lozenge 1 Lozenge Oral every 6 hours PRN Sore Throat  dextrose 40% Gel 15 Gram(s) Oral once PRN Blood Glucose LESS THAN 70 milliGRAM(s)/deciliter  glucagon  Injectable 1 milliGRAM(s) IntraMuscular once PRN Glucose LESS THAN 70 milligrams/deciliter      Vital Signs Last 24 Hrs  T(C): 36.8 (2020 08:17), Max: 37.6 (2020 08:26)  T(F): 98.2 (2020 08:17), Max: 99.6 (2020 08:26)  HR: 106 (2020 08:17) (106 - 133)  BP: 104/53 (2020 08:17) (103/63 - 128/77)  BP(mean): 70 (2020 08:17) (70 - 70)  RR: 18 (2020 08:17) (17 - 19)  SpO2: 98% (2020 08:17) (96% - 100%)    I&O's Detail    I&O's Summary          PHYSICAL EXAM:  General: Appears well developed, well nourished, no acute distress. not in acute pain  HEAD: normal cephalic. Atraumatic  PUPILS: equal and reactive to light  EARS: normal hearing  NECK: supple. no JVD or HJR. no carotid bruits. no visible lymphadenopathy  NOSE: no gross abnormalities  CHEST: symmetric chest wall expansion  CARDIOVASCULAR: Normal rate. Regular rhythm. Normal S1 and S2, no S3/S4,  no murmur, rub, or gallop  LUNGS: Normal effort. Normal respiratory rate. Breath sounds are clear to auscultation bilaterally. No respiratory distress. No stridor.  no rales, rhonchi or wheeze. no decreased Breath sounds  ABDOMEN: Soft, nontender, non-distended, positive bowel sounds, no mass or bruit. no abdominal tenderness. No rebound. no ascites  EXTREMITIES: No clubbing, cyanosis or edema. normal range of motion  PULSES:  distal pulses WNL  SKIN: Warm and dry with normal turgor. no visible rash or cyanosis   NEURO: Alert & oriented x 3, grossly intact with no focal weakness  PSYCH: normal mood and affect. Grossly normal insight and judgement exhibited    FAMILY HISTORY:  No pertinent family history of ischemic heart disease in mother, father or in first degree relatives      SOCIAL HISTORY:  former smoking. No ETOH/No IVDA    REVIEW OF SYSTEMS:  Constitutional: no fever, chills or malaise. No weight loss  Head: no trauma  Eyes: no visual deficit. No double vision  Ears: no hearing deficit or ringing in the ears  Nose: no nose bleeds or smell changes or congestion  Throat: no difficult swallowing or painful swallowing  Neck: supple. No lymphadenopathy or swelling  Respiratory: no SOB, wheeze, asthma, COPD. No cough. No blood in the sputum  Cardiovascular: no CP, palpitations, irregular heart beats. No edema. No PND. No orthopnea. No skin/temperature or color changes  Gastrointestinal: no abdominal pain. No constipation. No diarrhea. No melena. No nausea. No vomiting. No bloating  Genitourinary: no frequency or urgency. No hematuria  Lymphatics: no grossly swollen lymph nodes  Musculoskeletal: no limitation of range of motion. Normal strength. No pain  Integumentary: no visible rash. No itching  Neurologic: no HA. No TIA or stroke symptoms. No seizure. No hx of epilepsy. No tingling or numbness. No weakness. No dizziness  Psychiatric: denied. Reports appropriate mood.        LABS:                        9.1    20.16 )-----------( 679      ( 2020 08:30 )             29.5     01-28    132<L>  |  88<L>  |  15.0  ----------------------------<  126<H>  3.8   |  26.0  |  0.62    Ca    7.2<L>      2020 08:30  Phos  2.2       Mg     1.5                 Serum Pro-Brain Natriuretic Peptide: 1079 pg/mL ( @ 13:30)  serum  Lipids:   Hemoglobin A1C, Whole Blood: 6.3 % ( @ 09:19)    Thyroid Stimulating Hormone, Serum: 1.54 uIU/mL ( @ 05:58)      RADIOLOGY & ADDITIONAL STUDIES:    EC/28 Sinus tach 130    ECHO: < from: TTE Echo Complete w/Doppler (20 @ 09:25) >     PHYSICIAN INTERPRETATION:  Left Ventricle: The left ventricular internal cavity size is normal.  Global LV systolic function was normal. Left ventricular ejection fraction, by visual estimation, is 60 to 65%. Spectral Doppler shows normal pattern of LV diastolic filling.  Right Ventricle: The right ventricular size is normal. RV systolic function is normal.  Left Atrium: Mild to moderately enlarged left atrium. Lipomatous hypertrophy of the intra-atrial septum.  Right Atrium: Normal right atrial size.  Pericardium: Trivial pericardial effusion is present. There is a significant pericardial fat pad present.  Mitral Valve: The mitral valve is normal in structure. Mild thickening of the anterior and posterior mitral valve leaflets. No evidence of mitral stenosis. Mild to moderate mitral valve regurgitation is seen. The MR jet is centrally-directed.  Tricuspid Valve: The tricuspid valve is normal in structure. Trivial tricuspid regurgitation is visualized. Adequate TR velocity was not obtained to accurately assess RVSP.  Aortic Valve: The aortic valve is trileaflet. Mild to moderate aortic stenosis is present. Mild aorticvalve regurgitation is seen.  Pulmonic Valve: Trace pulmonic valve regurgitation.  Aorta: The aortic root and ascending aorta are structurally normal, with no evidence of dilitation.  Venous: The inferior vena cava was normal sized, with respiratory size variation greater than 50%.       Summary:   1. Left ventricular ejection fraction, by visual estimation, is 60 to 65%.   2. Normal global left ventricular systolic function.   3. Normal left ventricular internal cavity size.   4. There is mild concentric left ventricular hypertrophy.   5. Mild to moderately enlarged left atrium.   6. Mild thickening of the anterior and posterior mitral valve leaflets.   7. Mild to moderate mitral valve regurgitation.   8. Mild to moderate aortic valve stenosis.   9. Mild aortic regurgitation.  10. Lipomatous hypertrophy of the intra-atrial septum.  11. Trivial pericardial effusion.    < end of copied text >    < from: CT Angio Chest w/ IV Cont (20 @ 21:25) >  IMPRESSION:   1. Small pericardial effusion.   2. Multifocal irregular consolidations bilaterally compatible with pneumonia.    < end of copied text >    < from: Xray Chest 1 View-PORTABLE IMMEDIATE (20 @ 15:36) >    IMPRESSION: Heart enlargement. Diffuse infiltrates.    < end of copied text >      ASSESSMENT AND PLAN:  In summary, MELY LIN is a 64y Female with past medical history significant for HTN, HLD, DM, CAD based on elevated coronary artery calcium score, negative nuclear stress test 19, RA, chronic anemia with recent admission to [facility] 2019 for PRBC transfusion, admitted with hypoxia, SOB and LE edema, mildly elevated troponin.  s/p acute exacerbation of chroinic diastolic CHF, resolved. CT scan evidence of bilateral PNA. Pt with asymptomatic resting sinus tachycardia with stable hemodynamics    - Sinus tachycardia with stable hemodynamics. Sinus tachycardia is compensatory. Would treat underlying etiology, likely multifactorial. CTA with multifocal irregular consolidations bilaterally compatible with PNA.  Component on azotemia as per BUN/Cr ratio.  ABX as per ID. As asymptomatic sinus tachycardia with stable hemodynamics, no need to aggressively treat sinus tach.  Will continue bisoprolol ( recently resumed) as may be a component of reflex tachycardia with withholding beta blocker, and lower lasix to 20mg daily to start tomorrow.    - TSH normal at 1.54    - Acute on chronic diastolic CHF resolved. Pt laying flat without cardiac sx. No JVD or evidence of cardiac decompensation.    - Elevated Troponin 0.1--0.12-0.12.  Flat trend not consistent with ACS.  Likely represents demand ischemia on presentation.    - CAD/ Known CAD based on elevated coronary artery calcium score with negative nuclear stress test 19.  No chest pain or ischemic EKG changes.  Given anemia requiring recent PRBC transfusion would prefer outpatient nuclear stress test as initial strategy.    - Ideally should be on ASA.  ASA listed in allergies as "upset stomach, nausea."  This is more of an intolerance.  Can retry as an outpt if allowed by hematology.    - PNA. ABX as per medical/ID. elevated WBC may be due to steroids    - Keep K > 4, Mg > 2    - Hx of anemia requiring PRBC.  Monitor H/H    - Echocardiogram 20 EF 60-65%, LAE, mild-mod MR, mild-mod AS, trivial pericardial effusion. Will follow up valvular heart disease as an outpt    - Trivial pericardial effusion without hemodynamic significance. F/u echo as an outpt    - HL. LDL 69, continue Lipitor 20 for now. chol 136, Trig 184, HDL 30, LDL 69    - BCx have been negative    - on DVT prophylaxis    - RA management as per medical    - Telemetry monitoring personally reviewed by me. sinus, sinus tach to  overnight    - ECG personally reviewed by me    - Echocardiogram imaging personally reviewed by me    - radiologic imaging reviewed    - Laboratory data reviewed.    - I personally dw NP, Marivel regarding plan    - I have personally reviewed all obtainable prior records and data    - No further inpatient cardiac work up planned.  Routine outpatient follow up in our office after discharge.  Will follow as needed.  Please call with any CV questions or concerns.        Kay Sales MD

## 2020-01-29 NOTE — CHART NOTE - NSCHARTNOTEFT_GEN_A_CORE
Upon Nutritional Assessment by the Registered Dietitian your patient was determined to meet criteria / has evidence of the following diagnosis/diagnoses:          [ ]  Mild Protein Calorie Malnutrition        [ ]  Moderate Protein Calorie Malnutrition        [ x] Severe Protein Calorie Malnutrition        [ ] Unspecified Protein Calorie Malnutrition        [ ] Underweight / BMI <19        [ ] Morbid Obesity / BMI > 40    Pt presents at high nutrition risk secondary to malnutrition (severe, chronic) related to inability to meet sufficient protein-energy in setting of increased needs associated with CHF and decreased appetite/po intake over the last year as evidenced by meeting <75% nutrient needs >1 month, mild muscle loss of temples and shoulders, moderate muscle loss of clavicles, moderate fat loss of buccal pads, orbitals, triceps, 19.5% wt loss x 1 year.     Findings as based on:  •  Comprehensive nutrition assessment and consultation  •  Calorie counts (nutrient intake analysis)  •  Food acceptance and intake status from observations by staff  •  Follow up  •  Patient education  •  Intervention secondary to interdisciplinary rounds  •   concerns      Treatment:    The following  has been recommended:    1) Add DASH/TLC to diet rx.  2) Add Glucerna TID to optimize po intake and provide an additional 220 kcal, 10g protein per serving.  3) Rx: MVI daily.   4) Encourage small, frequent meals and to make protein a priority.  5) Obtain daily weights to monitor trends.      PROVIDER Section:     By signing this assessment you are acknowledging and agree with the diagnosis/diagnoses assigned by the Registered Dietitian    Comments:

## 2020-01-29 NOTE — DIETITIAN INITIAL EVALUATION ADULT. - OTHER INFO
64 year old female with h/o RA, DM2, HTN, and chronic iron deficiency anemia who was referred to the ER by her PMD for complaints of difficulty breathing. According to the patient, she was admitted to [facility] from 12/17-12/20 for anemia, was transfused 2 units of PRBC and discharged. Since being discharged from the hospital she has had complaints of progressively worsening dyspnea on exertion associated with lower extremity edema. Chest x-ray consistent with cardiomegaly and pulmonary edema. Admitted for acute CHF. CT with evidence of PNA; started on abx. Spoke with pt and pts daughter at bedside. Reports pt with decreased po intake over the last year with subsequent weight loss of ~37 lbs. States since admission, pts appetite/po intake has further declined. At home, pt does monitor sodium/carbohydrate intake. Diet education deferred at this time. Encouraged HBV protein sources. Pt also with c/o constipation at this time.

## 2020-01-29 NOTE — DIETITIAN INITIAL EVALUATION ADULT. - DIET TYPE
fluid restriction per MD discretion/DASH/TLC (sodium and cholesterol restricted diet)/Glucerna TID to optimize po intake and provide an additional 220 kcal, 10g protein per serving/consistent carbohydrate (evening snack) DASH/TLC (sodium and cholesterol restricted diet)/fluid restriction per MD discretion./Glucerna TID to optimize po intake and provide an additional 220 kcal, 10g protein per serving/consistent carbohydrate (evening snack)

## 2020-01-29 NOTE — DIETITIAN INITIAL EVALUATION ADULT. - MALNUTRITION
NFPE: mild muscle loss of temples and shoulders, moderate muscle loss of clavicles, moderate fat loss of buccal pads, orbitals, triceps severe, chronic

## 2020-01-29 NOTE — PROVIDER CONTACT NOTE (OTHER) - BACKGROUND
Dx: Heart Failure is on Levaquin for +PNA.  Pt is baseline tachycardiac in 100-110s however has been sustaining in 122 for some time.
PT ARRIVED TO ED POST PRBC'S TRANSFUSION AT OTHER FACILITY- PT PRESENTED WITH SOB/DIFFICULTY BREATHING
Pt has been tachycardic during the night, Po Lopressor given 2hrs earlier this AM
Pt has been tachycardic

## 2020-01-29 NOTE — DIETITIAN INITIAL EVALUATION ADULT. - PERTINENT LABORATORY DATA
01-29 Na129 mmol/L<L> Glu 155 mg/dL<H> K+ 4.0 mmol/L Cr  0.55 mg/dL BUN 15.0 mg/dL Phos 2.9 mg/dL Alb n/a   PAB n/a

## 2020-01-29 NOTE — DIETITIAN INITIAL EVALUATION ADULT. - ADD RECOMMEND
Rx: MVI daily. Encourage small, frequent meals and to make protein a priority. Obtain daily weights to monitor trends. Rx: MVI daily. Encourage small, frequent meals and to make protein a priority. Continue bowel regimen. Obtain daily weights to monitor trends.

## 2020-01-29 NOTE — PROGRESS NOTE ADULT - SUBJECTIVE AND OBJECTIVE BOX
[facility]  INFECTIOUS DISEASES AND INTERNAL MEDICINE at [place]  =======================================================  Darrel Ruffin MD  Diplomates American Board of Internal Medicine and Infectious Diseases  =======================================================    [id number]  MELY RILEY     Follow up: Pneumonia    Feels better, appetite is good, no mroe SOB. No cough or fever.     PAST MEDICAL & SURGICAL HISTORY:  Anemia  Rheumatoid arthritis  Essential hypertension  No significant past surgical history    Social Hx: no smoking, ETOH or drugs.     FAMILY HISTORY:  No pertinent family history in first degree relatives    Allergies  aspirin (Stomach Upset; Nausea)  penicillin (Angioedema)    Antibiotics:    aztreonam  IVPB 1000 milliGRAM(s) IV Intermittent every 8 hours     REVIEW OF SYSTEMS:  CONSTITUTIONAL:  No Fever or chills  HEENT:  No diplopia or blurred vision.  No sore throat or runny nose.  CARDIOVASCULAR:  No chest pain or SOB.  RESPIRATORY:  No cough, shortness of breath, PND or orthopnea.  GASTROINTESTINAL:  No nausea, vomiting or diarrhea.  GENITOURINARY:  No dysuria, frequency or urgency. No Blood in urine  MUSCULOSKELETAL:  no joint aches, no muscle pain  SKIN:  No change in skin, hair or nails.  NEUROLOGIC:  No paresthesias, fasciculations, seizures or weakness.  PSYCHIATRIC:  No disorder of thought or mood.  ENDOCRINE:  No heat or cold intolerance, polyuria or polydipsia.  HEMATOLOGICAL:  No easy bruising or bleeding.     Physical Exam:  Vital Signs Last 24 Hrs  T(C): 36.8 (29 Jan 2020 08:17), Max: 36.9 (28 Jan 2020 16:20)  T(F): 98.2 (29 Jan 2020 08:17), Max: 98.5 (28 Jan 2020 16:20)  HR: 106 (29 Jan 2020 08:17) (106 - 118)  BP: 104/53 (29 Jan 2020 08:17) (103/63 - 128/77)  BP(mean): 70 (29 Jan 2020 08:17) (70 - 70)  RR: 18 (29 Jan 2020 08:17) (18 - 19)  SpO2: 98% (29 Jan 2020 08:17) (97% - 100%)  GEN: NAD  HEENT: normocephalic and atraumatic. EOMI. PERRL.    NECK: Supple.  No lymphadenopathy   LUNGS: scattered rhonchi   HEART: Regular rate and rhythm   ABDOMEN: Soft, nontender, and nondistended.  Positive bowel sounds.    : No CVA tenderness  EXTREMITIES: no edema, joint changes due to RA   NEUROLOGIC: grossly intact.  PSYCHIATRIC: Appropriate affect .  SKIN: No ulceration or induration present.    Labs:  01-29    129<L>  |  89<L>  |  15.0  ----------------------------<  155<H>  4.0   |  29.0  |  0.55    Ca    7.5<L>      29 Jan 2020 08:26  Phos  2.9     01-29  Mg     2.4     01-29                        8.9    20.51 )-----------( 695      ( 29 Jan 2020 08:26 )             29.4       RECENT CULTURES:  01-26 @ 11:39 .Blood     No growth at 48 hours    01-24 @ 18:19      NotDete    All imaging and other data have been reviewed.  < from: CT Angio Chest w/ IV Cont (01.25.20 @ 21:25) >   EXAM:  CT ANGIO CHEST (W)AW IC                        PROCEDURE DATE:  01/25/2020    INTERPRETATION:  PROCEDURE INFORMATION:   Exam: CT Angiography Chest With Contrast   Exam date and time: 1/25/2020 9:19 PM   Age: 64 years old   Clinical indication: Pain; Angina pectoris   TECHNIQUE:   Imaging protocol: Computed tomographic angiography of the chest with   intravenous contrast.   3D rendering: MIP and/or 3D reconstructed images were created by the   technologist.   Contrast material:[id number]; Contrast volume: 48 ml; Contrast route: IV;    COMPARISON:   CR XR CHEST IMMEDIATE 1/24/2020 3:26 PM   FINDINGS:   Pulmonary arteries: Normal. No pulmonary emboli.   Aorta: Atherosclerotic aorta. No aneurysm or acute aortic syndrome. Coronary artery calcifications.  Lungs: Multifocal irregular consolidations and distributed throughout the lungs bilaterally.   Pleural space: Unremarkable. No pneumothorax. No pleural effusion.   Heart: Cardiomegaly. Small pericardial effusion.   Mediastinum: Esophagus is unremarkable.   Lymph nodes: Unremarkable. No enlarged lymph nodes.   Bones/joints: Healing fracture of the posterolateral left seventh rib.   Soft tissues: Unremarkable.   IMPRESSION:   1. Small pericardial effusion.   2. Multifocal irregular consolidations bilaterally compatible with pneumonia.      Assessment and Plan:   65 y/o woman with PMH of rheumatoid arthritis on DMARDs and prednisone, DM2, HTN and chronic iron deficiency anemia s/p admission to [facility] last month 12/17 to 12/20 for transfusion was admitted on 1/24 with SOB.  Chest CT showed bilateral opacities, so she was started on Aztreonam (PCN allergy) and one dose vancomycin given. Since clinically improving, I don't beive that worsening of leukocytosis is related to worsening of pneumonia. Also on low dose prednisone.     Pneumonia  RA-On chronic low dose prednisone immunocompromised  Leukocytosis     - RVP negative  - Trend WBC, most likely due to steroid, since clinically improved significantly and procalcitonin is not high.   - CT result noted.   - procalcitonin=0.15 not high   - Will switch to oral Levaquin 750mg daily to complete total 7-10 days.       Will follow.

## 2020-01-29 NOTE — DIETITIAN INITIAL EVALUATION ADULT. - PERTINENT MEDS FT
MEDICATIONS  (STANDING):  atorvastatin 20 milliGRAM(s) Oral at bedtime  bisoprolol   Tablet 10 milliGRAM(s) Oral daily  calcium carbonate    500 mG (Tums) Chewable 1 Tablet(s) Chew two times a day  dextrose 5%. 1000 milliLiter(s) (50 mL/Hr) IV Continuous <Continuous>  dextrose 50% Injectable 12.5 Gram(s) IV Push once  dextrose 50% Injectable 25 Gram(s) IV Push once  dextrose 50% Injectable 25 Gram(s) IV Push once  ferrous    sulfate 325 milliGRAM(s) Oral daily  insulin lispro (HumaLOG) corrective regimen sliding scale   SubCutaneous three times a day before meals  levoFLOXacin  Tablet 750 milliGRAM(s) Oral every 24 hours  polyethylene glycol 3350 17 Gram(s) Oral daily  potassium chloride    Tablet ER 20 milliEquivalent(s) Oral daily  predniSONE   Tablet 5 milliGRAM(s) Oral daily    MEDICATIONS  (PRN):  acetaminophen   Tablet .. 650 milliGRAM(s) Oral every 6 hours PRN Mild Pain (1 - 3)  acetaminophen   Tablet .. 650 milliGRAM(s) Oral every 6 hours PRN Temp greater or equal to 38C (100.4F)  albuterol/ipratropium for Nebulization 3 milliLiter(s) Nebulizer every 6 hours PRN Shortness of Breath and/or Wheezing  benzocaine 15 mG/menthol 3.6 mG (Sugar-Free) Lozenge 1 Lozenge Oral every 6 hours PRN Sore Throat  dextrose 40% Gel 15 Gram(s) Oral once PRN Blood Glucose LESS THAN 70 milliGRAM(s)/deciliter  glucagon  Injectable 1 milliGRAM(s) IntraMuscular once PRN Glucose LESS THAN 70 milligrams/deciliter

## 2020-01-29 NOTE — DIETITIAN INITIAL EVALUATION ADULT. - CONTINUE CURRENT NUTRITION CARE PLAN
yes/-- add Glucerna TID to optimize po intake and provide an additional 220 kcal, 10g protein per serving.

## 2020-01-29 NOTE — PROGRESS NOTE ADULT - SUBJECTIVE AND OBJECTIVE BOX
CHIEF COMPLAINT/INTERVAL HISTORY:    Patient is a 64y old  Female who presents with a chief complaint of Difficulty breathing (29 Jan 2020 23:12)    SUBJECTIVE & OBJECTIVE: Pt seen and examined at bedside. No overnight events. Patient reports feeling better today; clinically improved. However, sodium down to 129. Hold Lasix. Work up ordered. Check pulse ox; may need home O2.    ROS: No chest pain, palpitations, SOB, light headedness, dizziness, headache, nausea/vomiting, fevers/chills, abdominal pain, dysuria or increased urinary frequency.    ICU Vital Signs Last 24 Hrs  T(C): 37 (30 Jan 2020 07:33), Max: 37 (30 Jan 2020 07:33)  T(F): 98.6 (30 Jan 2020 07:33), Max: 98.6 (30 Jan 2020 07:33)  HR: 108 (30 Jan 2020 07:33) (108 - 122)  BP: 108/68 (30 Jan 2020 07:33) (100/66 - 108/68)  RR: 18 (30 Jan 2020 07:33) (16 - 18)  SpO2: 98% (30 Jan 2020 07:33) (97% - 99%)    MEDICATIONS  (STANDING):  atorvastatin 20 milliGRAM(s) Oral at bedtime  bisoprolol   Tablet 10 milliGRAM(s) Oral daily  calcium carbonate    500 mG (Tums) Chewable 1 Tablet(s) Chew two times a day  dextrose 5%. 1000 milliLiter(s) (50 mL/Hr) IV Continuous <Continuous>  dextrose 50% Injectable 12.5 Gram(s) IV Push once  dextrose 50% Injectable 25 Gram(s) IV Push once  dextrose 50% Injectable 25 Gram(s) IV Push once  ferrous    sulfate 325 milliGRAM(s) Oral daily  insulin lispro (HumaLOG) corrective regimen sliding scale   SubCutaneous three times a day before meals  levoFLOXacin  Tablet 750 milliGRAM(s) Oral every 24 hours  polyethylene glycol 3350 17 Gram(s) Oral daily  potassium chloride    Tablet ER 20 milliEquivalent(s) Oral daily  predniSONE   Tablet 5 milliGRAM(s) Oral daily    MEDICATIONS  (PRN):  acetaminophen   Tablet .. 650 milliGRAM(s) Oral every 6 hours PRN Mild Pain (1 - 3)  acetaminophen   Tablet .. 650 milliGRAM(s) Oral every 6 hours PRN Temp greater or equal to 38C (100.4F)  albuterol/ipratropium for Nebulization 3 milliLiter(s) Nebulizer every 6 hours PRN Shortness of Breath and/or Wheezing  benzocaine 15 mG/menthol 3.6 mG (Sugar-Free) Lozenge 1 Lozenge Oral every 6 hours PRN Sore Throat  dextrose 40% Gel 15 Gram(s) Oral once PRN Blood Glucose LESS THAN 70 milliGRAM(s)/deciliter  glucagon  Injectable 1 milliGRAM(s) IntraMuscular once PRN Glucose LESS THAN 70 milligrams/deciliter      LABS:                        8.9    20.51 )-----------( 695      ( 29 Jan 2020 08:26 )             29.4     01-29    129<L>  |  89<L>  |  15.0  ----------------------------<  155<H>  4.0   |  29.0  |  0.55    Ca    7.5<L>      29 Jan 2020 08:26  Phos  2.9     01-29  Mg     2.4     01-29      CAPILLARY BLOOD GLUCOSE      POCT Blood Glucose.: 123 mg/dL (29 Jan 2020 21:44)  POCT Blood Glucose.: 96 mg/dL (29 Jan 2020 17:54)  POCT Blood Glucose.: 161 mg/dL (29 Jan 2020 11:39)      PHYSICAL EXAM:    GENERAL: elderly female, sitting in chair, NAD  HEAD:  Atraumatic, Normocephalic  EYES: EOMI, PERRLA, conjunctiva and sclera clear  ENMT: Moist mucous membranes  NECK: Supple   NERVOUS SYSTEM:  Alert & Oriented X3   CHEST/LUNG: coarse breath sounds  HEART: Tachycardic,  + S1/S2  ABDOMEN: Soft, Nontender, Nondistended; Bowel sounds present  EXTREMITIES: no pedal edema

## 2020-01-30 LAB
ANION GAP SERPL CALC-SCNC: 12 MMOL/L — SIGNIFICANT CHANGE UP (ref 5–17)
APPEARANCE UR: CLEAR — SIGNIFICANT CHANGE UP
BACTERIA # UR AUTO: ABNORMAL
BILIRUB UR-MCNC: ABNORMAL
BUN SERPL-MCNC: 20 MG/DL — SIGNIFICANT CHANGE UP (ref 8–20)
CALCIUM SERPL-MCNC: 8.9 MG/DL — SIGNIFICANT CHANGE UP (ref 8.6–10.2)
CHLORIDE SERPL-SCNC: 90 MMOL/L — LOW (ref 98–107)
CO2 SERPL-SCNC: 27 MMOL/L — SIGNIFICANT CHANGE UP (ref 22–29)
COLOR SPEC: YELLOW — SIGNIFICANT CHANGE UP
CREAT SERPL-MCNC: 0.67 MG/DL — SIGNIFICANT CHANGE UP (ref 0.5–1.3)
DIFF PNL FLD: NEGATIVE — SIGNIFICANT CHANGE UP
EPI CELLS # UR: ABNORMAL
GLUCOSE BLDC GLUCOMTR-MCNC: 127 MG/DL — HIGH (ref 70–99)
GLUCOSE BLDC GLUCOMTR-MCNC: 180 MG/DL — HIGH (ref 70–99)
GLUCOSE BLDC GLUCOMTR-MCNC: 86 MG/DL — SIGNIFICANT CHANGE UP (ref 70–99)
GLUCOSE BLDC GLUCOMTR-MCNC: 95 MG/DL — SIGNIFICANT CHANGE UP (ref 70–99)
GLUCOSE SERPL-MCNC: 190 MG/DL — HIGH (ref 70–99)
GLUCOSE UR QL: NEGATIVE MG/DL — SIGNIFICANT CHANGE UP
HCT VFR BLD CALC: 31.3 % — LOW (ref 34.5–45)
HGB BLD-MCNC: 9.6 G/DL — LOW (ref 11.5–15.5)
KETONES UR-MCNC: NEGATIVE — SIGNIFICANT CHANGE UP
LEUKOCYTE ESTERASE UR-ACNC: ABNORMAL
MAGNESIUM SERPL-MCNC: 2.1 MG/DL — SIGNIFICANT CHANGE UP (ref 1.6–2.6)
MCHC RBC-ENTMCNC: 24.1 PG — LOW (ref 27–34)
MCHC RBC-ENTMCNC: 30.7 GM/DL — LOW (ref 32–36)
MCV RBC AUTO: 78.6 FL — LOW (ref 80–100)
NITRITE UR-MCNC: NEGATIVE — SIGNIFICANT CHANGE UP
OSMOLALITY SERPL: 287 MOSMOL/KG — SIGNIFICANT CHANGE UP (ref 280–301)
OSMOLALITY UR: 759 MOSM/KG — SIGNIFICANT CHANGE UP (ref 300–1000)
PH UR: 5 — SIGNIFICANT CHANGE UP (ref 5–8)
PHOSPHATE SERPL-MCNC: 3 MG/DL — SIGNIFICANT CHANGE UP (ref 2.4–4.7)
PLATELET # BLD AUTO: 626 K/UL — HIGH (ref 150–400)
POTASSIUM SERPL-MCNC: 4.5 MMOL/L — SIGNIFICANT CHANGE UP (ref 3.5–5.3)
POTASSIUM SERPL-SCNC: 4.5 MMOL/L — SIGNIFICANT CHANGE UP (ref 3.5–5.3)
PROT UR-MCNC: 30 MG/DL
RBC # BLD: 3.98 M/UL — SIGNIFICANT CHANGE UP (ref 3.8–5.2)
RBC # FLD: 18.3 % — HIGH (ref 10.3–14.5)
RBC CASTS # UR COMP ASSIST: SIGNIFICANT CHANGE UP /HPF (ref 0–4)
SODIUM SERPL-SCNC: 129 MMOL/L — LOW (ref 135–145)
SODIUM UR-SCNC: <30 MMOL/L — SIGNIFICANT CHANGE UP
SP GR SPEC: 1.02 — SIGNIFICANT CHANGE UP (ref 1.01–1.02)
TSH SERPL-MCNC: 3.51 UIU/ML — SIGNIFICANT CHANGE UP (ref 0.27–4.2)
URATE SERPL-MCNC: 6.2 MG/DL — HIGH (ref 2.4–5.7)
UROBILINOGEN FLD QL: 8 MG/DL
WBC # BLD: 15.73 K/UL — HIGH (ref 3.8–10.5)
WBC # FLD AUTO: 15.73 K/UL — HIGH (ref 3.8–10.5)
WBC UR QL: SIGNIFICANT CHANGE UP

## 2020-01-30 PROCEDURE — 99233 SBSQ HOSP IP/OBS HIGH 50: CPT

## 2020-01-30 PROCEDURE — 99232 SBSQ HOSP IP/OBS MODERATE 35: CPT

## 2020-01-30 RX ORDER — TRAMADOL HYDROCHLORIDE 50 MG/1
50 TABLET ORAL EVERY 6 HOURS
Refills: 0 | Status: DISCONTINUED | OUTPATIENT
Start: 2020-01-30 | End: 2020-02-04

## 2020-01-30 RX ORDER — TRAMADOL HYDROCHLORIDE 50 MG/1
25 TABLET ORAL EVERY 6 HOURS
Refills: 0 | Status: DISCONTINUED | OUTPATIENT
Start: 2020-01-30 | End: 2020-02-04

## 2020-01-30 RX ORDER — SODIUM CHLORIDE 9 MG/ML
500 INJECTION INTRAMUSCULAR; INTRAVENOUS; SUBCUTANEOUS
Refills: 0 | Status: COMPLETED | OUTPATIENT
Start: 2020-01-30 | End: 2020-01-31

## 2020-01-30 RX ADMIN — Medication 650 MILLIGRAM(S): at 05:36

## 2020-01-30 RX ADMIN — Medication 2: at 12:50

## 2020-01-30 RX ADMIN — Medication 5 MILLIGRAM(S): at 05:35

## 2020-01-30 RX ADMIN — Medication 650 MILLIGRAM(S): at 04:36

## 2020-01-30 RX ADMIN — Medication 325 MILLIGRAM(S): at 12:50

## 2020-01-30 RX ADMIN — Medication 1 TABLET(S): at 05:35

## 2020-01-30 RX ADMIN — TRAMADOL HYDROCHLORIDE 50 MILLIGRAM(S): 50 TABLET ORAL at 15:34

## 2020-01-30 RX ADMIN — BISOPROLOL FUMARATE 10 MILLIGRAM(S): 10 TABLET, FILM COATED ORAL at 05:35

## 2020-01-30 RX ADMIN — Medication 20 MILLIEQUIVALENT(S): at 12:50

## 2020-01-30 RX ADMIN — ATORVASTATIN CALCIUM 20 MILLIGRAM(S): 80 TABLET, FILM COATED ORAL at 23:07

## 2020-01-30 RX ADMIN — Medication 1 TABLET(S): at 17:29

## 2020-01-30 RX ADMIN — TRAMADOL HYDROCHLORIDE 25 MILLIGRAM(S): 50 TABLET ORAL at 19:20

## 2020-01-30 NOTE — PROGRESS NOTE ADULT - SUBJECTIVE AND OBJECTIVE BOX
[facility]  INFECTIOUS DISEASES AND INTERNAL MEDICINE at [place]  =======================================================  Darrel Ruffin MD  Diplomates American Board of Internal Medicine and Infectious Diseases  =======================================================    [facility]-[id number]  MELY RILEY     Follow up: Pneumonia    No chest pain, no SOB. No cough or fever.   Has body aches all over today.     PAST MEDICAL & SURGICAL HISTORY:  Anemia  Rheumatoid arthritis  Essential hypertension  No significant past surgical history    Social Hx: no smoking, ETOH or drugs.     FAMILY HISTORY:  No pertinent family history in first degree relatives    Allergies  aspirin (Stomach Upset; Nausea)  penicillin (Angioedema)    Antibiotics:    aztreonam  IVPB 1000 milliGRAM(s) IV Intermittent every 8 hours     REVIEW OF SYSTEMS:  CONSTITUTIONAL:  No Fever or chills  HEENT:  No diplopia or blurred vision.  No sore throat or runny nose.  CARDIOVASCULAR:  No chest pain or SOB.  RESPIRATORY:  No cough, shortness of breath, PND or orthopnea.  GASTROINTESTINAL:  No nausea, vomiting or diarrhea.  GENITOURINARY:  No dysuria, frequency or urgency. No Blood in urine  MUSCULOSKELETAL:  no joint aches, no muscle pain  SKIN:  No change in skin, hair or nails.  NEUROLOGIC:  No paresthesias, fasciculations, seizures or weakness.  PSYCHIATRIC:  No disorder of thought or mood.  ENDOCRINE:  No heat or cold intolerance, polyuria or polydipsia.  HEMATOLOGICAL:  No easy bruising or bleeding.     Physical Exam:  Vital Signs Last 24 Hrs  T(C): 37 (30 Jan 2020 07:33), Max: 37 (30 Jan 2020 07:33)  T(F): 98.6 (30 Jan 2020 07:33), Max: 98.6 (30 Jan 2020 07:33)  HR: 108 (30 Jan 2020 07:33) (108 - 122)  BP: 108/68 (30 Jan 2020 07:33) (100/66 - 108/68)  BP(mean): --  RR: 18 (30 Jan 2020 07:33) (16 - 18)  SpO2: 98% (30 Jan 2020 07:33) (97% - 99%)  GEN: NAD  HEENT: normocephalic and atraumatic. EOMI. PERRL.    NECK: Supple.  No lymphadenopathy   LUNGS: scattered rhonchi   HEART: Regular rate and rhythm   ABDOMEN: Soft, nontender, and nondistended.  Positive bowel sounds.    : No CVA tenderness  EXTREMITIES: no edema, joint changes due to RA   NEUROLOGIC: grossly intact.  PSYCHIATRIC: Appropriate affect .  SKIN: No ulceration or induration present.    Labs:  01-30    129<L>  |  90<L>  |  20.0  ----------------------------<  190<H>  4.5   |  27.0  |  0.67    Ca    8.9      30 Jan 2020 10:35  Phos  3.0     01-30  Mg     2.1     01-30                        9.6    15.73 )-----------( 626      ( 30 Jan 2020 10:35 )             31.3   RECENT CULTURES:  01-26 @ 11:39 .Blood     No growth at 48 hours    01-24 @ 18:19      [facility]    All imaging and other data have been reviewed.  < from: CT Angio Chest w/ IV Cont (01.25.20 @ 21:25) >   EXAM:  CT ANGIO CHEST (W)AW IC                        PROCEDURE DATE:  01/25/2020    INTERPRETATION:  PROCEDURE INFORMATION:   Exam: CT Angiography Chest With Contrast   Exam date and time: 1/25/2020 9:19 PM   Age: 64 years old   Clinical indication: Pain; Angina pectoris   TECHNIQUE:   Imaging protocol: Computed tomographic angiography of the chest with   intravenous contrast.   3D rendering: MIP and/or 3D reconstructed images were created by the   technologist.   Contrast material:[id number]; Contrast volume: 48 ml; Contrast route: IV;    COMPARISON:   CR XR CHEST IMMEDIATE 1/24/2020 3:26 PM   FINDINGS:   Pulmonary arteries: Normal. No pulmonary emboli.   Aorta: Atherosclerotic aorta. No aneurysm or acute aortic syndrome. Coronary artery calcifications.  Lungs: Multifocal irregular consolidations and distributed throughout the lungs bilaterally.   Pleural space: Unremarkable. No pneumothorax. No pleural effusion.   Heart: Cardiomegaly. Small pericardial effusion.   Mediastinum: Esophagus is unremarkable.   Lymph nodes: Unremarkable. No enlarged lymph nodes.   Bones/joints: Healing fracture of the posterolateral left seventh rib.   Soft tissues: Unremarkable.   IMPRESSION:   1. Small pericardial effusion.   2. Multifocal irregular consolidations bilaterally compatible with pneumonia.      Assessment and Plan:   63 y/o woman with PMH of rheumatoid arthritis on DMARDs and prednisone, DM2, HTN and chronic iron deficiency anemia s/p admission to [facility] last month 12/17 to 12/20 for transfusion was admitted on 1/24 with SOB.  Chest CT showed bilateral opacities, so she was started on Aztreonam (PCN allergy) and one dose vancomycin given. Since clinically improving, I don't beive that worsening of leukocytosis is related to worsening of pneumonia. Also on low dose prednisone.     Pneumonia  RA-On chronic low dose prednisone immunocompromised  Leukocytosis     - Blood culture neg on 1/26  - RVP negative  - Trend WBC,20k-->15k    - CT result noted.   - procalcitonin=0.15 not high   - Continue oral Levaquin 750mg daily to complete total 7-10 days.     Will follow.

## 2020-01-30 NOTE — PROGRESS NOTE ADULT - SUBJECTIVE AND OBJECTIVE BOX
CHIEF COMPLAINT/INTERVAL HISTORY:    Patient is a 64y old  Female who presents with a chief complaint of Difficulty breathing (30 Jan 2020 13:58)    SUBJECTIVE & OBJECTIVE: Pt seen and examined at bedside. No overnight events. Complaining of RA pain; started on tramadol. Need orthostatics and urine studies to appropriately treated hypoNA. Discussed with RN.    ROS: No chest pain, palpitations, SOB, light headedness, dizziness, headache, nausea/vomiting, fevers/chills, abdominal pain, dysuria.    ICU Vital Signs Last 24 Hrs  T(C): 37 (30 Jan 2020 07:33), Max: 37 (30 Jan 2020 07:33)  T(F): 98.6 (30 Jan 2020 07:33), Max: 98.6 (30 Jan 2020 07:33)  HR: 108 (30 Jan 2020 07:33) (108 - 122)  BP: 108/68 (30 Jan 2020 07:33) (100/66 - 108/68)  RR: 18 (30 Jan 2020 07:33) (16 - 18)  SpO2: 98% (30 Jan 2020 07:33) (97% - 99%)    MEDICATIONS  (STANDING):  atorvastatin 20 milliGRAM(s) Oral at bedtime  bisoprolol   Tablet 10 milliGRAM(s) Oral daily  calcium carbonate    500 mG (Tums) Chewable 1 Tablet(s) Chew two times a day  dextrose 5%. 1000 milliLiter(s) (50 mL/Hr) IV Continuous <Continuous>  dextrose 50% Injectable 12.5 Gram(s) IV Push once  dextrose 50% Injectable 25 Gram(s) IV Push once  dextrose 50% Injectable 25 Gram(s) IV Push once  ferrous    sulfate 325 milliGRAM(s) Oral daily  insulin lispro (HumaLOG) corrective regimen sliding scale   SubCutaneous three times a day before meals  levoFLOXacin  Tablet 750 milliGRAM(s) Oral every 24 hours  polyethylene glycol 3350 17 Gram(s) Oral daily  potassium chloride    Tablet ER 20 milliEquivalent(s) Oral daily  predniSONE   Tablet 5 milliGRAM(s) Oral daily    MEDICATIONS  (PRN):  acetaminophen   Tablet .. 650 milliGRAM(s) Oral every 6 hours PRN Mild Pain (1 - 3)  acetaminophen   Tablet .. 650 milliGRAM(s) Oral every 6 hours PRN Temp greater or equal to 38C (100.4F)  albuterol/ipratropium for Nebulization 3 milliLiter(s) Nebulizer every 6 hours PRN Shortness of Breath and/or Wheezing  benzocaine 15 mG/menthol 3.6 mG (Sugar-Free) Lozenge 1 Lozenge Oral every 6 hours PRN Sore Throat  dextrose 40% Gel 15 Gram(s) Oral once PRN Blood Glucose LESS THAN 70 milliGRAM(s)/deciliter  glucagon  Injectable 1 milliGRAM(s) IntraMuscular once PRN Glucose LESS THAN 70 milligrams/deciliter      LABS:                        9.6    15.73 )-----------( 626      ( 30 Jan 2020 10:35 )             31.3     01-30    129<L>  |  90<L>  |  20.0  ----------------------------<  190<H>  4.5   |  27.0  |  0.67    Ca    8.9      30 Jan 2020 10:35  Phos  3.0     01-30  Mg     2.1     01-30      CAPILLARY BLOOD GLUCOSE      POCT Blood Glucose.: 180 mg/dL (30 Jan 2020 12:00)  POCT Blood Glucose.: 127 mg/dL (30 Jan 2020 08:41)  POCT Blood Glucose.: 123 mg/dL (29 Jan 2020 21:44)  POCT Blood Glucose.: 96 mg/dL (29 Jan 2020 17:54)      PHYSICAL EXAM:    GENERAL: elderly female, laying in bed, not in acute distress but uncomfortable   HEAD:  Atraumatic, Normocephalic  EYES: EOMI, PERRLA, conjunctiva and sclera clear  ENMT: Moist mucous membranes  NECK: Supple   NERVOUS SYSTEM:  Alert & Oriented X3   CHEST/LUNG: bibasilar rales  HEART: Regular rate and rhythm; + S1/S2  ABDOMEN: Soft, Nontender, Nondistended; Bowel sounds present  EXTREMITIES: no pedal edema

## 2020-01-31 LAB
ANION GAP SERPL CALC-SCNC: 12 MMOL/L — SIGNIFICANT CHANGE UP (ref 5–17)
BASOPHILS # BLD AUTO: 0.06 K/UL — SIGNIFICANT CHANGE UP (ref 0–0.2)
BASOPHILS NFR BLD AUTO: 0.4 % — SIGNIFICANT CHANGE UP (ref 0–2)
BUN SERPL-MCNC: 19 MG/DL — SIGNIFICANT CHANGE UP (ref 8–20)
CALCIUM SERPL-MCNC: 8.8 MG/DL — SIGNIFICANT CHANGE UP (ref 8.6–10.2)
CHLORIDE SERPL-SCNC: 90 MMOL/L — LOW (ref 98–107)
CO2 SERPL-SCNC: 27 MMOL/L — SIGNIFICANT CHANGE UP (ref 22–29)
CORTIS AM PEAK SERPL-MCNC: 9.8 UG/DL — SIGNIFICANT CHANGE UP (ref 6–18.4)
CREAT SERPL-MCNC: 0.63 MG/DL — SIGNIFICANT CHANGE UP (ref 0.5–1.3)
CULTURE RESULTS: SIGNIFICANT CHANGE UP
CULTURE RESULTS: SIGNIFICANT CHANGE UP
EOSINOPHIL # BLD AUTO: 0.53 K/UL — HIGH (ref 0–0.5)
EOSINOPHIL NFR BLD AUTO: 3.2 % — SIGNIFICANT CHANGE UP (ref 0–6)
GLUCOSE BLDC GLUCOMTR-MCNC: 109 MG/DL — HIGH (ref 70–99)
GLUCOSE BLDC GLUCOMTR-MCNC: 135 MG/DL — HIGH (ref 70–99)
GLUCOSE BLDC GLUCOMTR-MCNC: 139 MG/DL — HIGH (ref 70–99)
GLUCOSE BLDC GLUCOMTR-MCNC: 139 MG/DL — HIGH (ref 70–99)
GLUCOSE BLDC GLUCOMTR-MCNC: 173 MG/DL — HIGH (ref 70–99)
GLUCOSE BLDC GLUCOMTR-MCNC: 193 MG/DL — HIGH (ref 70–99)
GLUCOSE BLDC GLUCOMTR-MCNC: 205 MG/DL — HIGH (ref 70–99)
GLUCOSE SERPL-MCNC: 133 MG/DL — HIGH (ref 70–99)
HCT VFR BLD CALC: 31.3 % — LOW (ref 34.5–45)
HGB BLD-MCNC: 9.5 G/DL — LOW (ref 11.5–15.5)
IMM GRANULOCYTES NFR BLD AUTO: 1.7 % — HIGH (ref 0–1.5)
LEGIONELLA AG UR QL: NEGATIVE — SIGNIFICANT CHANGE UP
LYMPHOCYTES # BLD AUTO: 0.76 K/UL — LOW (ref 1–3.3)
LYMPHOCYTES # BLD AUTO: 4.7 % — LOW (ref 13–44)
MAGNESIUM SERPL-MCNC: 1.9 MG/DL — SIGNIFICANT CHANGE UP (ref 1.6–2.6)
MCHC RBC-ENTMCNC: 24.1 PG — LOW (ref 27–34)
MCHC RBC-ENTMCNC: 30.4 GM/DL — LOW (ref 32–36)
MCV RBC AUTO: 79.4 FL — LOW (ref 80–100)
MONOCYTES # BLD AUTO: 0.53 K/UL — SIGNIFICANT CHANGE UP (ref 0–0.9)
MONOCYTES NFR BLD AUTO: 3.2 % — SIGNIFICANT CHANGE UP (ref 2–14)
NEUTROPHILS # BLD AUTO: 14.17 K/UL — HIGH (ref 1.8–7.4)
NEUTROPHILS NFR BLD AUTO: 86.8 % — HIGH (ref 43–77)
NT-PROBNP SERPL-SCNC: 1533 PG/ML — HIGH (ref 0–300)
PHOSPHATE SERPL-MCNC: 3.5 MG/DL — SIGNIFICANT CHANGE UP (ref 2.4–4.7)
PLATELET # BLD AUTO: 701 K/UL — HIGH (ref 150–400)
POTASSIUM SERPL-MCNC: 5 MMOL/L — SIGNIFICANT CHANGE UP (ref 3.5–5.3)
POTASSIUM SERPL-SCNC: 5 MMOL/L — SIGNIFICANT CHANGE UP (ref 3.5–5.3)
RBC # BLD: 3.94 M/UL — SIGNIFICANT CHANGE UP (ref 3.8–5.2)
RBC # FLD: 18.3 % — HIGH (ref 10.3–14.5)
SODIUM SERPL-SCNC: 129 MMOL/L — LOW (ref 135–145)
SPECIMEN SOURCE: SIGNIFICANT CHANGE UP
SPECIMEN SOURCE: SIGNIFICANT CHANGE UP
WBC # BLD: 16.33 K/UL — HIGH (ref 3.8–10.5)
WBC # FLD AUTO: 16.33 K/UL — HIGH (ref 3.8–10.5)

## 2020-01-31 PROCEDURE — 71045 X-RAY EXAM CHEST 1 VIEW: CPT | Mod: 26,77

## 2020-01-31 PROCEDURE — 99233 SBSQ HOSP IP/OBS HIGH 50: CPT

## 2020-01-31 PROCEDURE — 71046 X-RAY EXAM CHEST 2 VIEWS: CPT | Mod: 26

## 2020-01-31 PROCEDURE — 99232 SBSQ HOSP IP/OBS MODERATE 35: CPT

## 2020-01-31 PROCEDURE — 99222 1ST HOSP IP/OBS MODERATE 55: CPT

## 2020-01-31 RX ORDER — HYDROCORTISONE 1 %
1 OINTMENT (GRAM) TOPICAL
Refills: 0 | Status: DISCONTINUED | OUTPATIENT
Start: 2020-01-31 | End: 2020-02-04

## 2020-01-31 RX ORDER — DIPHENHYDRAMINE HCL 50 MG
25 CAPSULE ORAL EVERY 6 HOURS
Refills: 0 | Status: DISCONTINUED | OUTPATIENT
Start: 2020-01-31 | End: 2020-02-04

## 2020-01-31 RX ORDER — FUROSEMIDE 40 MG
20 TABLET ORAL DAILY
Refills: 0 | Status: DISCONTINUED | OUTPATIENT
Start: 2020-01-31 | End: 2020-02-04

## 2020-01-31 RX ADMIN — POLYETHYLENE GLYCOL 3350 17 GRAM(S): 17 POWDER, FOR SOLUTION ORAL at 11:35

## 2020-01-31 RX ADMIN — Medication 0: at 14:06

## 2020-01-31 RX ADMIN — BISOPROLOL FUMARATE 10 MILLIGRAM(S): 10 TABLET, FILM COATED ORAL at 06:01

## 2020-01-31 RX ADMIN — Medication 40 MILLIGRAM(S): at 22:39

## 2020-01-31 RX ADMIN — Medication 1 TABLET(S): at 06:02

## 2020-01-31 RX ADMIN — Medication 325 MILLIGRAM(S): at 11:35

## 2020-01-31 RX ADMIN — Medication 25 MILLIGRAM(S): at 11:35

## 2020-01-31 RX ADMIN — Medication 1 TABLET(S): at 17:03

## 2020-01-31 RX ADMIN — TRAMADOL HYDROCHLORIDE 50 MILLIGRAM(S): 50 TABLET ORAL at 06:03

## 2020-01-31 RX ADMIN — Medication 2: at 18:24

## 2020-01-31 RX ADMIN — Medication 25 MILLIGRAM(S): at 20:07

## 2020-01-31 RX ADMIN — Medication 20 MILLIGRAM(S): at 11:35

## 2020-01-31 RX ADMIN — Medication 40 MILLIGRAM(S): at 11:35

## 2020-01-31 RX ADMIN — Medication 5 MILLIGRAM(S): at 06:02

## 2020-01-31 RX ADMIN — Medication 1 APPLICATION(S): at 17:05

## 2020-01-31 RX ADMIN — Medication 20 MILLIEQUIVALENT(S): at 11:35

## 2020-01-31 RX ADMIN — SODIUM CHLORIDE 75 MILLILITER(S): 9 INJECTION INTRAMUSCULAR; INTRAVENOUS; SUBCUTANEOUS at 08:46

## 2020-01-31 RX ADMIN — ATORVASTATIN CALCIUM 20 MILLIGRAM(S): 80 TABLET, FILM COATED ORAL at 22:39

## 2020-01-31 NOTE — CONSULT NOTE ADULT - SUBJECTIVE AND OBJECTIVE BOX
PULMONARY CONSULT NOTE      RAFIQ [id number]    Patient is a 64y old  Female who presents with a chief complaint of Difficulty breathing (2020 15:00)      HISTORY OF PRESENT ILLNESS:    The patient is a 64 year old female with a history of rheumatoid arthritis on DMARD agents and prednisone, diabetes mellitus type 2, hypertension and chronic iron deficiency anemia who was referred to the ER by her PMD for complaints of difficulty breathing. According to the patient, she was admitted to [facility] from - for anemia. She was transfused 2 units of PRBC and discharged. SInce being discharged from [facility] she has complaints of progressive worsening WALKER associated with lower extremity edema. She was evaluated by her PMD and referred her to the ER. Was noted to have SPo2 of 91% on room air improved with NC. Labs positive for a BNP of 1079 and troponin of 0.10. Patient denied complaints of chest pain and had stress test 3 months ago which she reported as normal. Chest xray was consistent with cardiomegaly and pulmonary edema and she was admitted for acute CHF. CT later demonstrated  evidence of PNA and was started on abx.    MEDICATIONS  (STANDING):  atorvastatin 20 milliGRAM(s) Oral at bedtime  bisoprolol   Tablet 10 milliGRAM(s) Oral daily  calcium carbonate    500 mG (Tums) Chewable 1 Tablet(s) Chew two times a day  dextrose 5%. 1000 milliLiter(s) (50 mL/Hr) IV Continuous <Continuous>  dextrose 50% Injectable 12.5 Gram(s) IV Push once  dextrose 50% Injectable 25 Gram(s) IV Push once  dextrose 50% Injectable 25 Gram(s) IV Push once  ferrous    sulfate 325 milliGRAM(s) Oral daily  furosemide   Injectable 20 milliGRAM(s) IV Push daily  hydrocortisone 1% Cream 1 Application(s) Topical two times a day  insulin lispro (HumaLOG) corrective regimen sliding scale   SubCutaneous three times a day before meals  levoFLOXacin  Tablet 750 milliGRAM(s) Oral every 24 hours  methylPREDNISolone sodium succinate Injectable 40 milliGRAM(s) IV Push every 8 hours  polyethylene glycol 3350 17 Gram(s) Oral daily  potassium chloride    Tablet ER 20 milliEquivalent(s) Oral daily      MEDICATIONS  (PRN):  acetaminophen   Tablet .. 650 milliGRAM(s) Oral every 6 hours PRN Mild Pain (1 - 3)  acetaminophen   Tablet .. 650 milliGRAM(s) Oral every 6 hours PRN Temp greater or equal to 38C (100.4F)  albuterol/ipratropium for Nebulization 3 milliLiter(s) Nebulizer every 6 hours PRN Shortness of Breath and/or Wheezing  benzocaine 15 mG/menthol 3.6 mG (Sugar-Free) Lozenge 1 Lozenge Oral every 6 hours PRN Sore Throat  dextrose 40% Gel 15 Gram(s) Oral once PRN Blood Glucose LESS THAN 70 milliGRAM(s)/deciliter  diphenhydrAMINE 25 milliGRAM(s) Oral every 6 hours PRN Rash and/or Itching  glucagon  Injectable 1 milliGRAM(s) IntraMuscular once PRN Glucose LESS THAN 70 milligrams/deciliter  traMADol 25 milliGRAM(s) Oral every 6 hours PRN Moderate Pain (4 - 6)  traMADol 50 milliGRAM(s) Oral every 6 hours PRN Severe Pain (7 - 10)      Allergies    aspirin (Stomach Upset; Nausea)  penicillin (Angioedema)    Intolerances        PAST MEDICAL & SURGICAL HISTORY:  Anemia  Rheumatoid arthritis  Essential hypertension  No significant past surgical history      FAMILY HISTORY:  No pertinent family history in first degree relatives      SOCIAL HISTORY  Smoking History:     REVIEW OF SYSTEMS:    CONSTITUTIONAL:  No fevers, chills, sweats    HEENT:  Eyes:  No diplopia or blurred vision. ENT:  No earache, sore throat or runny nose. sinus headache or postnasl drip    CARDIOVASCULAR:  No pressure, squeezing, tightness, or heaviness about the chest; no palpitations, leg swelling, orthopnea or PND    RESPIRATORY:  No cough, shortness of breath, PND or orthopnea. Mild SOBOE    GASTROINTESTINAL:  No abdominal pain, nausea, vomiting or diarrhea.    GENITOURINARY:  No dysuria, frequency or urgency.    NEUROLOGIC:  No paresthesias, fasciculations, seizures or weakness.    PSYCHIATRIC:  No disorder of thought or mood.    Vital Signs Last 24 Hrs  T(C): 37.2 (2020 07:37), Max: 37.3 (2020 00:33)  T(F): 99 (2020 07:37), Max: 99.1 (2020 00:33)  HR: 114 (2020 07:37) (114 - 124)  BP: 107/68 (2020 07:37) (107/68 - 111/64)  BP(mean): --  RR: 18 (2020 07:37) (18 - 18)  SpO2: 96% (2020 07:37) (94% - 98%)    PHYSICAL EXAMINATION:    GENERAL: The patient is a well-developed, well-nourished _____in no apparent distress.     HEENT: Head is normocephalic and atraumatic. Extraocular muscles are intact. Mucous membranes are moist.     NECK: Supple.     LUNGS: Clear to auscultation without wheezing, rales, or rhonchi. Respirations unlabored    HEART: Regular rate and rhythm without murmur.    ABDOMEN: Soft, nontender, and nondistended.  No hepatosplenomegaly is noted.    EXTREMITIES: Without any cyanosis, clubbing, rash, lesions or edema.    NEUROLOGIC: Grossly intact.      LABS:                        9.5    16.33 )-----------( 701      ( 2020 10:06 )             31.3     0131    129<L>  |  90<L>  |  19.0  ----------------------------<  133<H>  5.0   |  27.0  |  0.63    Ca    8.8      2020 10:06  Phos  3.5       Mg     1.9             Urinalysis Basic - ( 2020 18:51 )    Color: Yellow / Appearance: Clear / S.020 / pH: x  Gluc: x / Ketone: Negative  / Bili: Small / Urobili: 8 mg/dL   Blood: x / Protein: 30 mg/dL / Nitrite: Negative   Leuk Esterase: Trace / RBC: 0-2 /HPF / WBC 0-2   Sq Epi: x / Non Sq Epi: Moderate / Bacteria: Few      Serum Pro-Brain Natriuretic Peptide (20 @ 10:06)    Serum Pro-Brain Natriuretic Peptide: 1533: NT-proBNP Interpretive comments:  Acute Congestive Heart Failure is unlikely if NT-proBNP is less than 300  pg/mL, for any age.  Consider Acute Congestive Heart Failure if:  AGE               NT-proBNP Result  ___               ________________  < 50year           > 450 pg/mL  50 - 75 years     > 900 pg/mL  > 75 years         > 1800 pg/ml  All results require clinical correlation. Consider obtaining a baseline or  "dry" NT-proBNP level when the patient is stabilized, so that subsequent  levels can be related to that. Patients with recurrent CHF may have  elevated  NT-proBNP levels. Acute failure episodes generally produce levels at  least 25  % greater than base line levels. The above values are derived from a large  multi-center international study, "Gerardo, TIAN, et al, European Heart  Journal,  2006; 27:330-337. pg/mL            Serum Pro-Brain Natriuretic Peptide: 1533 pg/mL (20 @ 10:06)      Procalcitonin, Serum: 0.15 ng/mL (20 @ 08:26)      MICROBIOLOGY:    RADIOLOGY & ADDITIONAL STUDIES:  < from: CT Angio Chest w/ IV Cont (20 @ 21:25) >   EXAM:  CT ANGIO CHEST (W)AW IC                          PROCEDURE DATE:  2020          INTERPRETATION:  PROCEDURE INFORMATION:   Exam: CT Angiography Chest With Contrast   Exam date and time: 2020 9:19 PM   Age: 64 years old   Clinical indication: Pain; Angina pectoris     TECHNIQUE:   Imaging protocol: Computed tomographic angiography of the chest with   intravenous contrast.   3D rendering: MIP and/or 3D reconstructed images were created by the   technologist.   Contrast material:[id number]; Contrast volume: 48 ml; Contrast route: IV;      COMPARISON:   CR XR CHEST IMMEDIATE 2020 3:26 PM     FINDINGS:   Pulmonary arteries: Normal. No pulmonary emboli.   Aorta: Atherosclerotic aorta. No aneurysm or acute aortic syndrome. Coronary artery calcifications.  Lungs: Multifocal irregular consolidations and distributed throughout the lungs bilaterally.   Pleural space: Unremarkable. No pneumothorax. No pleural effusion.   Heart: Cardiomegaly. Small pericardial effusion.   Mediastinum: Esophagus is unremarkable.   Lymph nodes: Unremarkable. No enlarged lymph nodes.   Bones/joints: Healing fracture of the posterolateral left seventh rib.   Soft tissues: Unremarkable.     IMPRESSION:   1. Small pericardial effusion.   2. Multifocal irregular consolidations bilaterally compatible with pneumonia.                WAN MCKAY M.D., ATTENDING RADIOLOGIST  This document has been electronically signed. 2020  8:58AM        < end of copied text >  Echo:  < from: TTE Echo Complete w/Doppler (20 @ 09:25) >   1. Left ventricular ejection fraction, by visual estimation, is 60 to 65%.   2. Normal global left ventricular systolic function.   3. Normal left ventricular internal cavity size.   4. There is mild concentric left ventricular hypertrophy.   5. Mild to moderately enlarged left atrium.   6. Mild thickening of the anterior and posterior mitral valve leaflets.   7. Mild to moderate mitral valve regurgitation.   8. Mild to moderate aortic valve stenosis.   9. Mild aortic regurgitation.  10. Lipomatous hypertrophy of the intra-atrial septum.  11. Trivial pericardial effusion.    MD Obdulio Electronically signed on 2020 at 2:22:09 PM    < end of copied text > PULMONARY CONSULT NOTE      RAFIQ [id number]    Patient is a 64y old  Female who presents with a chief complaint of Difficulty breathing (2020 15:00)      HISTORY OF PRESENT ILLNESS:    The patient is a 64 year old female with a history of rheumatoid arthritis on DMARD agents and prednisone, diabetes mellitus type 2, hypertension and chronic iron deficiency anemia who was referred to the ER by her PMD for complaints of difficulty breathing. According to the patient, she was admitted to [facility] from - for anemia. She was transfused 2 units of PRBC and discharged. SInce being discharged from [facility] she has complaints of progressive worsening WALKER associated with lower extremity edema. She was evaluated by her PMD and referred her to the ER. Was noted to have SPo2 of 91% on room air improved with NC. Labs positive for a BNP of 1079 and troponin of 0.10. Patient denied complaints of chest pain and had stress test 3 months ago which she reported as normal. Chest xray was consistent with cardiomegaly and pulmonary edema and she was admitted for acute CHF. CT later demonstrated  evidence of PNA and was started on abx and steroids.    Currently awake and alert in NAD on NCO. Has noted streaking hemoptysis without persistence    MEDICATIONS  (STANDING):  atorvastatin 20 milliGRAM(s) Oral at bedtime  bisoprolol   Tablet 10 milliGRAM(s) Oral daily  calcium carbonate    500 mG (Tums) Chewable 1 Tablet(s) Chew two times a day  dextrose 5%. 1000 milliLiter(s) (50 mL/Hr) IV Continuous <Continuous>  dextrose 50% Injectable 12.5 Gram(s) IV Push once  dextrose 50% Injectable 25 Gram(s) IV Push once  dextrose 50% Injectable 25 Gram(s) IV Push once  ferrous    sulfate 325 milliGRAM(s) Oral daily  furosemide   Injectable 20 milliGRAM(s) IV Push daily  hydrocortisone 1% Cream 1 Application(s) Topical two times a day  insulin lispro (HumaLOG) corrective regimen sliding scale   SubCutaneous three times a day before meals  levoFLOXacin  Tablet 750 milliGRAM(s) Oral every 24 hours  methylPREDNISolone sodium succinate Injectable 40 milliGRAM(s) IV Push every 8 hours  polyethylene glycol 3350 17 Gram(s) Oral daily  potassium chloride    Tablet ER 20 milliEquivalent(s) Oral daily      MEDICATIONS  (PRN):  acetaminophen   Tablet .. 650 milliGRAM(s) Oral every 6 hours PRN Mild Pain (1 - 3)  acetaminophen   Tablet .. 650 milliGRAM(s) Oral every 6 hours PRN Temp greater or equal to 38C (100.4F)  albuterol/ipratropium for Nebulization 3 milliLiter(s) Nebulizer every 6 hours PRN Shortness of Breath and/or Wheezing  benzocaine 15 mG/menthol 3.6 mG (Sugar-Free) Lozenge 1 Lozenge Oral every 6 hours PRN Sore Throat  dextrose 40% Gel 15 Gram(s) Oral once PRN Blood Glucose LESS THAN 70 milliGRAM(s)/deciliter  diphenhydrAMINE 25 milliGRAM(s) Oral every 6 hours PRN Rash and/or Itching  glucagon  Injectable 1 milliGRAM(s) IntraMuscular once PRN Glucose LESS THAN 70 milligrams/deciliter  traMADol 25 milliGRAM(s) Oral every 6 hours PRN Moderate Pain (4 - 6)  traMADol 50 milliGRAM(s) Oral every 6 hours PRN Severe Pain (7 - 10)      Allergies    aspirin (Stomach Upset; Nausea)  penicillin (Angioedema)    Intolerances        PAST MEDICAL & SURGICAL HISTORY:  Anemia  Rheumatoid arthritis  Essential hypertension  No significant past surgical history      FAMILY HISTORY:  No pertinent family history in first degree relatives      SOCIAL HISTORY  Smoking History:     REVIEW OF SYSTEMS:    CONSTITUTIONAL:  No fevers, chills, sweats    HEENT:  Eyes:  No diplopia or blurred vision. ENT:  No earache, sore throat or runny nose. sinus headache or postnasl drip    CARDIOVASCULAR:  No pressure, squeezing, tightness, or heaviness about the chest; no palpitations, leg swelling, orthopnea or PND    RESPIRATORY:  No cough, shortness of breath, PND or orthopnea. Mild SOBOE    GASTROINTESTINAL:  No abdominal pain, nausea, vomiting or diarrhea.    GENITOURINARY:  No dysuria, frequency or urgency.    NEUROLOGIC:  No paresthesias, fasciculations, seizures or weakness.    PSYCHIATRIC:  No disorder of thought or mood.    Vital Signs Last 24 Hrs  T(C): 37.2 (2020 07:37), Max: 37.3 (2020 00:33)  T(F): 99 (2020 07:37), Max: 99.1 (2020 00:33)  HR: 114 (2020 07:37) (114 - 124)  BP: 107/68 (2020 07:37) (107/68 - 111/64)  BP(mean): --  RR: 18 (2020 07:37) (18 - 18)  SpO2: 96% (2020 07:37) (94% - 98%)    PHYSICAL EXAMINATION:    GENERAL: The patient is a well-developed, well-nourished _____in no apparent distress.     HEENT: Head is normocephalic and atraumatic. Extraocular muscles are intact. Mucous membranes are moist.     NECK: Supple.     LUNGS: few rales stefano and posterior. No wheeze     HEART: Regular rate and rhythm without murmur.    ABDOMEN: Soft, nontender, and nondistended.  No hepatosplenomegaly is noted.    EXTREMITIES: Without any cyanosis, clubbing, rash, lesions or edema. RA deformities of hands stefano    NEUROLOGIC: Grossly intact.      LABS:                        9.5    16.33 )-----------( 701      ( 2020 10:06 )             31.3         129<L>  |  90<L>  |  19.0  ----------------------------<  133<H>  5.0   |  27.0  |  0.63    Ca    8.8      2020 10:06  Phos  3.5       Mg     1.9             Urinalysis Basic - ( 2020 18:51 )    Color: Yellow / Appearance: Clear / S.020 / pH: x  Gluc: x / Ketone: Negative  / Bili: Small / Urobili: 8 mg/dL   Blood: x / Protein: 30 mg/dL / Nitrite: Negative   Leuk Esterase: Trace / RBC: 0-2 /HPF / WBC 0-2   Sq Epi: x / Non Sq Epi: Moderate / Bacteria: Few      Serum Pro-Brain Natriuretic Peptide (20 @ 10:06)    Serum Pro-Brain Natriuretic Peptide: 1533: NT-proBNP Interpretive comments:  Acute Congestive Heart Failure is unlikely if NT-proBNP is less than 300  pg/mL, for any age.  Consider Acute Congestive Heart Failure if:  AGE               NT-proBNP Result  ___               ________________  < 50year           > 450 pg/mL  50 - 75 years     > 900 pg/mL  > 75 years         > 1800 pg/ml  All results require clinical correlation. Consider obtaining a baseline or  "dry" NT-proBNP level when the patient is stabilized, so that subsequent  levels can be related to that. Patients with recurrent CHF may have  elevated  NT-proBNP levels. Acute failure episodes generally produce levels at  least 25  % greater than base line levels. The above values are derived from a large  multi-center international study, "Gerardo, TIAN, et al, European Heart  Journal,  2006; 27:330-337. pg/mL            Serum Pro-Brain Natriuretic Peptide: 1533 pg/mL (20 @ 10:06)      Procalcitonin, Serum: 0.15 ng/mL (20 @ 08:26)      MICROBIOLOGY:    RADIOLOGY & ADDITIONAL STUDIES:  < from: CT Angio Chest w/ IV Cont (20 @ 21:25) >   EXAM:  CT ANGIO CHEST (W)AW IC                          PROCEDURE DATE:  2020          INTERPRETATION:  PROCEDURE INFORMATION:   Exam: CT Angiography Chest With Contrast   Exam date and time: 2020 9:19 PM   Age: 64 years old   Clinical indication: Pain; Angina pectoris     TECHNIQUE:   Imaging protocol: Computed tomographic angiography of the chest with   intravenous contrast.   3D rendering: MIP and/or 3D reconstructed images were created by the   technologist.   Contrast material:[id number]; Contrast volume: 48 ml; Contrast route: IV;      COMPARISON:   CR XR CHEST IMMEDIATE 2020 3:26 PM     FINDINGS:   Pulmonary arteries: Normal. No pulmonary emboli.   Aorta: Atherosclerotic aorta. No aneurysm or acute aortic syndrome. Coronary artery calcifications.  Lungs: Multifocal irregular consolidations and distributed throughout the lungs bilaterally.   Pleural space: Unremarkable. No pneumothorax. No pleural effusion.   Heart: Cardiomegaly. Small pericardial effusion.   Mediastinum: Esophagus is unremarkable.   Lymph nodes: Unremarkable. No enlarged lymph nodes.   Bones/joints: Healing fracture of the posterolateral left seventh rib.   Soft tissues: Unremarkable.     IMPRESSION:   1. Small pericardial effusion.   2. Multifocal irregular consolidations bilaterally compatible with pneumonia.                WAN MCKAY M.D., ATTENDING RADIOLOGIST  This document has been electronically signed. 2020  8:58AM        < end of copied text >  Echo:  < from: TTE Echo Complete w/Doppler (20 @ 09:25) >   1. Left ventricular ejection fraction, by visual estimation, is 60 to 65%.   2. Normal global left ventricular systolic function.   3. Normal left ventricular internal cavity size.   4. There is mild concentric left ventricular hypertrophy.   5. Mild to moderately enlarged left atrium.   6. Mild thickening of the anterior and posterior mitral valve leaflets.   7. Mild to moderate mitral valve regurgitation.   8. Mild to moderate aortic valve stenosis.   9. Mild aortic regurgitation.  10. Lipomatous hypertrophy of the intra-atrial septum.  11. Trivial pericardial effusion.    MD Obdulio Electronically signed on 2020 at 2:22:09 PM    < end of copied text >

## 2020-01-31 NOTE — PROGRESS NOTE ADULT - SUBJECTIVE AND OBJECTIVE BOX
CHIEF COMPLAINT/INTERVAL HISTORY:    Patient is a 64y old  Female who presents with a chief complaint of Difficulty breathing (2020 12:39)    SUBJECTIVE & OBJECTIVE: Pt seen and examined at bedside. No overnight events. Reports more dyspnea on exertion today and appears SOB. Will resume lasix. Check ABG and CXR. Pulm eval. Add empiric steroids.    ROS: No chest pain, palpitations, SOB, light headedness, dizziness, headache, nausea/vomiting, fevers/chills, abdominal pain, dysuria or increased urinary frequency.    ICU Vital Signs Last 24 Hrs  T(C): 37.2 (2020 07:37), Max: 37.3 (2020 00:33)  T(F): 99 (2020 07:37), Max: 99.1 (2020 00:33)  HR: 116 (2020 11:49) (114 - 124)  BP: 117/73 (2020 11:49) (107/68 - 117/73)  BP(mean): 88 (2020 11:49) (88 - 88)  RR: 18 (2020 07:37) (18 - 18)  SpO2: 96% (2020 07:37) (94% - 98%)    MEDICATIONS  (STANDING):  atorvastatin 20 milliGRAM(s) Oral at bedtime  bisoprolol   Tablet 10 milliGRAM(s) Oral daily  calcium carbonate    500 mG (Tums) Chewable 1 Tablet(s) Chew two times a day  dextrose 5%. 1000 milliLiter(s) (50 mL/Hr) IV Continuous <Continuous>  dextrose 50% Injectable 12.5 Gram(s) IV Push once  dextrose 50% Injectable 25 Gram(s) IV Push once  dextrose 50% Injectable 25 Gram(s) IV Push once  ferrous    sulfate 325 milliGRAM(s) Oral daily  furosemide   Injectable 20 milliGRAM(s) IV Push daily  hydrocortisone 1% Cream 1 Application(s) Topical two times a day  insulin lispro (HumaLOG) corrective regimen sliding scale   SubCutaneous three times a day before meals  levoFLOXacin  Tablet 750 milliGRAM(s) Oral every 24 hours  methylPREDNISolone sodium succinate Injectable 40 milliGRAM(s) IV Push every 8 hours  polyethylene glycol 3350 17 Gram(s) Oral daily  potassium chloride    Tablet ER 20 milliEquivalent(s) Oral daily    MEDICATIONS  (PRN):  acetaminophen   Tablet .. 650 milliGRAM(s) Oral every 6 hours PRN Mild Pain (1 - 3)  acetaminophen   Tablet .. 650 milliGRAM(s) Oral every 6 hours PRN Temp greater or equal to 38C (100.4F)  albuterol/ipratropium for Nebulization 3 milliLiter(s) Nebulizer every 6 hours PRN Shortness of Breath and/or Wheezing  benzocaine 15 mG/menthol 3.6 mG (Sugar-Free) Lozenge 1 Lozenge Oral every 6 hours PRN Sore Throat  dextrose 40% Gel 15 Gram(s) Oral once PRN Blood Glucose LESS THAN 70 milliGRAM(s)/deciliter  diphenhydrAMINE 25 milliGRAM(s) Oral every 6 hours PRN Rash and/or Itching  glucagon  Injectable 1 milliGRAM(s) IntraMuscular once PRN Glucose LESS THAN 70 milligrams/deciliter  traMADol 25 milliGRAM(s) Oral every 6 hours PRN Moderate Pain (4 - 6)  traMADol 50 milliGRAM(s) Oral every 6 hours PRN Severe Pain (7 - 10)      LABS:                        9.5    16.33 )-----------( 701      ( 2020 10:06 )             31.3     01    129<L>  |  90<L>  |  19.0  ----------------------------<  133<H>  5.0   |  27.0  |  0.63    Ca    8.8      2020 10:06  Phos  3.5       Mg     1.9             Urinalysis Basic - ( 2020 18:51 )    Color: Yellow / Appearance: Clear / S.020 / pH: x  Gluc: x / Ketone: Negative  / Bili: Small / Urobili: 8 mg/dL   Blood: x / Protein: 30 mg/dL / Nitrite: Negative   Leuk Esterase: Trace / RBC: 0-2 /HPF / WBC 0-2   Sq Epi: x / Non Sq Epi: Moderate / Bacteria: Few        CAPILLARY BLOOD GLUCOSE      POCT Blood Glucose.: 135 mg/dL (2020 14:05)  POCT Blood Glucose.: 173 mg/dL (2020 11:47)  POCT Blood Glucose.: 139 mg/dL (2020 08:59)  POCT Blood Glucose.: 109 mg/dL (2020 04:00)  POCT Blood Glucose.: 95 mg/dL (2020 22:48)  POCT Blood Glucose.: 86 mg/dL (2020 17:06)      PHYSICAL EXAM:    GENERAL: elderly female, laying in bed, not in acute distress   HEAD:  Atraumatic, Normocephalic  EYES: EOMI, PERRLA, conjunctiva and sclera clear  ENMT: Moist mucous membranes  NECK: Supple   NERVOUS SYSTEM:  Alert & Oriented X3   CHEST/LUNG: expiratory wheezing, rales  HEART: Regular rate and rhythm; + S1/S2  ABDOMEN: Soft, Nontender, Nondistended; Bowel sounds present  EXTREMITIES: no pedal edema

## 2020-01-31 NOTE — PROGRESS NOTE ADULT - SUBJECTIVE AND OBJECTIVE BOX
[facility]  INFECTIOUS DISEASES AND INTERNAL MEDICINE at [place]  =======================================================  Darrel Ruffin MD  Diplomates American Board of Internal Medicine and Infectious Diseases  =======================================================    [id number]  MELY RILEY     Follow up: Pneumonia    No chest pain, no SOB at rest but with activity desats to 80s. No cough or fever.   Has body aches all over today.     PAST MEDICAL & SURGICAL HISTORY:  Anemia  Rheumatoid arthritis  Essential hypertension  No significant past surgical history    Social Hx: no smoking, ETOH or drugs.     FAMILY HISTORY:  No pertinent family history in first degree relatives    Allergies  aspirin (Stomach Upset; Nausea)  penicillin (Angioedema)    Antibiotics:    aztreonam  IVPB 1000 milliGRAM(s) IV Intermittent every 8 hours     REVIEW OF SYSTEMS:  CONSTITUTIONAL:  No Fever or chills  HEENT:  No diplopia or blurred vision.  No sore throat or runny nose.  CARDIOVASCULAR:  No chest pain or SOB.  RESPIRATORY:  No cough, shortness of breath, PND or orthopnea.  GASTROINTESTINAL:  No nausea, vomiting or diarrhea.  GENITOURINARY:  No dysuria, frequency or urgency. No Blood in urine  MUSCULOSKELETAL:  no joint aches, no muscle pain  SKIN:  No change in skin, hair or nails.  NEUROLOGIC:  No paresthesias, fasciculations, seizures or weakness.  PSYCHIATRIC:  No disorder of thought or mood.  ENDOCRINE:  No heat or cold intolerance, polyuria or polydipsia.  HEMATOLOGICAL:  No easy bruising or bleeding.     Physical Exam:  Vital Signs Last 24 Hrs  T(C): 37.2 (2020 07:37), Max: 37.3 (2020 00:33)  T(F): 99 (2020 07:37), Max: 99.1 (2020 00:33)  HR: 116 (2020 11:49) (114 - 124)  BP: 117/73 (2020 11:49) (107/68 - 117/73)  BP(mean): 88 (2020 11:49) (88 - 88)  RR: 18 (2020 07:37) (18 - 18)  SpO2: 96% (2020 07:37) (94% - 98%)  GEN: NAD  HEENT: normocephalic and atraumatic. EOMI. PERRL.    NECK: Supple.  No lymphadenopathy   LUNGS: scattered rhonchi   HEART: Regular rate and rhythm   ABDOMEN: Soft, nontender, and nondistended.  Positive bowel sounds.    : No CVA tenderness  EXTREMITIES: no edema, joint changes due to RA   NEUROLOGIC: grossly intact.  PSYCHIATRIC: Appropriate affect .  SKIN: No ulceration or induration present.    Labs:      129<L>  |  90<L>  |  19.0  ----------------------------<  133<H>  5.0   |  27.0  |  0.63    Ca    8.8      2020 10:06  Phos  3.5       Mg     1.9                             9.5    16.33 )-----------( 701      ( 2020 10:06 )             31.3     Urinalysis Basic - ( 2020 18:51 )    Color: Yellow / Appearance: Clear / S.020 / pH: x  Gluc: x / Ketone: Negative  / Bili: Small / Urobili: 8 mg/dL   Blood: x / Protein: 30 mg/dL / Nitrite: Negative   Leuk Esterase: Trace / RBC: 0-2 /HPF / WBC 0-2   Sq Epi: x / Non Sq Epi: Moderate / Bacteria: Few    RECENT CULTURES:   @ 11:39 .Blood     No growth at 48 hours     @ 18:19      NotDetec    All imaging and other data have been reviewed.  < from: CT Angio Chest w/ IV Cont (20 @ 21:25) >   EXAM:  CT ANGIO CHEST (W)AW IC                        PROCEDURE DATE:  2020    INTERPRETATION:  PROCEDURE INFORMATION:   Exam: CT Angiography Chest With Contrast   Exam date and time: 2020 9:19 PM   Age: 64 years old   Clinical indication: Pain; Angina pectoris   TECHNIQUE:   Imaging protocol: Computed tomographic angiography of the chest with   intravenous contrast.   3D rendering: MIP and/or 3D reconstructed images were created by the   technologist.   Contrast material:[id number]; Contrast volume: 48 ml; Contrast route: IV;    COMPARISON:   CR XR CHEST IMMEDIATE 2020 3:26 PM   FINDINGS:   Pulmonary arteries: Normal. No pulmonary emboli.   Aorta: Atherosclerotic aorta. No aneurysm or acute aortic syndrome. Coronary artery calcifications.  Lungs: Multifocal irregular consolidations and distributed throughout the lungs bilaterally.   Pleural space: Unremarkable. No pneumothorax. No pleural effusion.   Heart: Cardiomegaly. Small pericardial effusion.   Mediastinum: Esophagus is unremarkable.   Lymph nodes: Unremarkable. No enlarged lymph nodes.   Bones/joints: Healing fracture of the posterolateral left seventh rib.   Soft tissues: Unremarkable.   IMPRESSION:   1. Small pericardial effusion.   2. Multifocal irregular consolidations bilaterally compatible with pneumonia.      Assessment and Plan:   63 y/o woman with PMH of rheumatoid arthritis on DMARDs and prednisone, DM2, HTN and chronic iron deficiency anemia s/p admission to [facility] last month  to  for transfusion was admitted on  with SOB.  Chest CT showed bilateral opacities, so she was started on Aztreonam (PCN allergy) and one dose vancomycin given. Since clinically improving, I don't beive that worsening of leukocytosis is related to worsening of pneumonia. Also on low dose prednisone.     Pneumonia  RA-On chronic low dose prednisone immunocompromised  Leukocytosis     - Blood culture neg on   - RVP negative  - Trend WBC,20k-->15k    - CT result noted.   - procalcitonin=0.15 not high   - Continue oral Levaquin 750mg daily to [id number] days total.   - The rest of the care as per pulmonary and primary team.     If remains in the hospital will see her on monday, if any questions over the weekend please call ID. [facility]  INFECTIOUS DISEASES AND INTERNAL MEDICINE at [place]  =======================================================  Darrel Ruffin MD  Diplomates American Board of Internal Medicine and Infectious Diseases  =======================================================    [id number]  MELY RILEY     Follow up: Pneumonia    No chest pain, no SOB at rest but with activity desats to 80s. No cough or fever.   Has body aches all over today.     PAST MEDICAL & SURGICAL HISTORY:  Anemia  Rheumatoid arthritis  Essential hypertension  No significant past surgical history    Social Hx: no smoking, ETOH or drugs.     FAMILY HISTORY:  No pertinent family history in first degree relatives    Allergies  aspirin (Stomach Upset; Nausea)  penicillin (Angioedema)    Antibiotics:    aztreonam  IVPB 1000 milliGRAM(s) IV Intermittent every 8 hours     REVIEW OF SYSTEMS:  CONSTITUTIONAL:  No Fever or chills  HEENT:  No diplopia or blurred vision.  No sore throat or runny nose.  CARDIOVASCULAR:  No chest pain or SOB.  RESPIRATORY:  No cough, shortness of breath, PND or orthopnea.  GASTROINTESTINAL:  No nausea, vomiting or diarrhea.  GENITOURINARY:  No dysuria, frequency or urgency. No Blood in urine  MUSCULOSKELETAL:  no joint aches, no muscle pain  SKIN:  No change in skin, hair or nails.  NEUROLOGIC:  No paresthesias, fasciculations, seizures or weakness.  PSYCHIATRIC:  No disorder of thought or mood.  ENDOCRINE:  No heat or cold intolerance, polyuria or polydipsia.  HEMATOLOGICAL:  No easy bruising or bleeding.     Physical Exam:  Vital Signs Last 24 Hrs  T(C): 37.2 (2020 07:37), Max: 37.3 (2020 00:33)  T(F): 99 (2020 07:37), Max: 99.1 (2020 00:33)  HR: 116 (2020 11:49) (114 - 124)  BP: 117/73 (2020 11:49) (107/68 - 117/73)  BP(mean): 88 (2020 11:49) (88 - 88)  RR: 18 (2020 07:37) (18 - 18)  SpO2: 96% (2020 07:37) (94% - 98%)  GEN: NAD  HEENT: normocephalic and atraumatic. EOMI. PERRL.    NECK: Supple.  No lymphadenopathy   LUNGS: scattered rhonchi   HEART: Regular rate and rhythm   ABDOMEN: Soft, nontender, and nondistended.  Positive bowel sounds.    : No CVA tenderness  EXTREMITIES: no edema, joint changes due to RA   NEUROLOGIC: grossly intact.  PSYCHIATRIC: Appropriate affect .  SKIN: No ulceration or induration present.    Labs:      129<L>  |  90<L>  |  19.0  ----------------------------<  133<H>  5.0   |  27.0  |  0.63    Ca    8.8      2020 10:06  Phos  3.5       Mg     1.9                             9.5    16.33 )-----------( 701      ( 2020 10:06 )             31.3     Urinalysis Basic - ( 2020 18:51 )    Color: Yellow / Appearance: Clear / S.020 / pH: x  Gluc: x / Ketone: Negative  / Bili: Small / Urobili: 8 mg/dL   Blood: x / Protein: 30 mg/dL / Nitrite: Negative   Leuk Esterase: Trace / RBC: 0-2 /HPF / WBC 0-2   Sq Epi: x / Non Sq Epi: Moderate / Bacteria: Few    RECENT CULTURES:   @ 11:39 .Blood     No growth at 48 hours     @ 18:19      NotDetec    All imaging and other data have been reviewed.  < from: CT Angio Chest w/ IV Cont (20 @ 21:25) >   EXAM:  CT ANGIO CHEST (W)AW IC                        PROCEDURE DATE:  2020    INTERPRETATION:  PROCEDURE INFORMATION:   Exam: CT Angiography Chest With Contrast   Exam date and time: 2020 9:19 PM   Age: 64 years old   Clinical indication: Pain; Angina pectoris   TECHNIQUE:   Imaging protocol: Computed tomographic angiography of the chest with   intravenous contrast.   3D rendering: MIP and/or 3D reconstructed images were created by the   technologist.   Contrast material:[id number]; Contrast volume: 48 ml; Contrast route: IV;    COMPARISON:   CR XR CHEST IMMEDIATE 2020 3:26 PM   FINDINGS:   Pulmonary arteries: Normal. No pulmonary emboli.   Aorta: Atherosclerotic aorta. No aneurysm or acute aortic syndrome. Coronary artery calcifications.  Lungs: Multifocal irregular consolidations and distributed throughout the lungs bilaterally.   Pleural space: Unremarkable. No pneumothorax. No pleural effusion.   Heart: Cardiomegaly. Small pericardial effusion.   Mediastinum: Esophagus is unremarkable.   Lymph nodes: Unremarkable. No enlarged lymph nodes.   Bones/joints: Healing fracture of the posterolateral left seventh rib.   Soft tissues: Unremarkable.   IMPRESSION:   1. Small pericardial effusion.   2. Multifocal irregular consolidations bilaterally compatible with pneumonia.      Assessment and Plan:   65 y/o woman with PMH of rheumatoid arthritis on DMARDs and prednisone, DM2, HTN and chronic iron deficiency anemia s/p admission to [facility] last month  to  for transfusion was admitted on  with SOB.  Chest CT showed bilateral opacities, so she was started on Aztreonam (PCN allergy) and one dose vancomycin given. Since clinically improving, I don't beive that worsening of leukocytosis is related to worsening of pneumonia. Also on low dose prednisone.     Pneumonia  RA-On chronic low dose prednisone immunocompromised  Leukocytosis     - Blood culture neg on   - RVP negative  - Trend WBC,20k-->15k    - CT result noted.   - procalcitonin=0.15 not high   - Continue oral Levaquin 750mg daily to [id number] days total.   - The rest of the care as per pulmonary and primary team.     Will sign off please call with any question.

## 2020-02-01 LAB
BASE EXCESS BLDA CALC-SCNC: 5.9 MMOL/L — HIGH (ref -3–3)
BLOOD GAS COMMENTS ARTERIAL: SIGNIFICANT CHANGE UP
GAS PNL BLDA: SIGNIFICANT CHANGE UP
GLUCOSE BLDC GLUCOMTR-MCNC: 175 MG/DL — HIGH (ref 70–99)
GLUCOSE BLDC GLUCOMTR-MCNC: 188 MG/DL — HIGH (ref 70–99)
GLUCOSE BLDC GLUCOMTR-MCNC: 205 MG/DL — HIGH (ref 70–99)
GLUCOSE BLDC GLUCOMTR-MCNC: 209 MG/DL — HIGH (ref 70–99)
GLUCOSE BLDC GLUCOMTR-MCNC: 217 MG/DL — HIGH (ref 70–99)
HCO3 BLDA-SCNC: 30 MMOL/L — HIGH (ref 20–26)
HOROWITZ INDEX BLDA+IHG-RTO: SIGNIFICANT CHANGE UP
PCO2 BLDA: 44 MMHG — SIGNIFICANT CHANGE UP (ref 35–45)
PH BLDA: 7.45 — SIGNIFICANT CHANGE UP (ref 7.35–7.45)
PO2 BLDA: 129 MMHG — HIGH (ref 83–108)
SAO2 % BLDA: 100 % — HIGH (ref 95–99)

## 2020-02-01 PROCEDURE — 93010 ELECTROCARDIOGRAM REPORT: CPT

## 2020-02-01 PROCEDURE — 99232 SBSQ HOSP IP/OBS MODERATE 35: CPT

## 2020-02-01 PROCEDURE — 93970 EXTREMITY STUDY: CPT | Mod: 26

## 2020-02-01 RX ORDER — ENOXAPARIN SODIUM 100 MG/ML
40 INJECTION SUBCUTANEOUS DAILY
Refills: 0 | Status: DISCONTINUED | OUTPATIENT
Start: 2020-02-01 | End: 2020-02-04

## 2020-02-01 RX ADMIN — Medication 2: at 13:31

## 2020-02-01 RX ADMIN — Medication 4: at 10:24

## 2020-02-01 RX ADMIN — Medication 20 MILLIEQUIVALENT(S): at 11:13

## 2020-02-01 RX ADMIN — ATORVASTATIN CALCIUM 20 MILLIGRAM(S): 80 TABLET, FILM COATED ORAL at 21:14

## 2020-02-01 RX ADMIN — Medication 40 MILLIGRAM(S): at 21:14

## 2020-02-01 RX ADMIN — BISOPROLOL FUMARATE 10 MILLIGRAM(S): 10 TABLET, FILM COATED ORAL at 06:59

## 2020-02-01 RX ADMIN — Medication 1 APPLICATION(S): at 07:03

## 2020-02-01 RX ADMIN — Medication 40 MILLIGRAM(S): at 13:30

## 2020-02-01 RX ADMIN — Medication 1 TABLET(S): at 17:19

## 2020-02-01 RX ADMIN — ENOXAPARIN SODIUM 40 MILLIGRAM(S): 100 INJECTION SUBCUTANEOUS at 13:30

## 2020-02-01 RX ADMIN — Medication 40 MILLIGRAM(S): at 06:59

## 2020-02-01 RX ADMIN — Medication 1 APPLICATION(S): at 17:18

## 2020-02-01 RX ADMIN — Medication 20 MILLIGRAM(S): at 06:59

## 2020-02-01 RX ADMIN — Medication 4: at 18:04

## 2020-02-01 RX ADMIN — Medication 1 TABLET(S): at 06:59

## 2020-02-01 RX ADMIN — POLYETHYLENE GLYCOL 3350 17 GRAM(S): 17 POWDER, FOR SOLUTION ORAL at 11:14

## 2020-02-01 RX ADMIN — Medication 325 MILLIGRAM(S): at 11:14

## 2020-02-01 NOTE — PROVIDER CONTACT NOTE (OTHER) - ACTION/TREATMENT ORDERED:
MD coming to assess; EKG ordered.
Will review chart contents. Instructed RN to Recheck in one hour if pt continues to sustain or increases >122, call back .
Lopressor 2,5 mg IVP times one, Tylenol 650 mg for arthritic pain to bilateral hands
SEE ABOVE
Ok to give AM dose of 25mg of Metoprolol now

## 2020-02-01 NOTE — PROVIDER CONTACT NOTE (OTHER) - ASSESSMENT
VSS; no s/s of distress noted.
/57     T: 98.4 F  RR: 18  PO2- 97% 3LNC
Pt complaining of heart palpitations at this time and arthritis pain of both hands
VS: B/P: 122/76, HR: 136, SPO2: 94% 2L O2 NC, T: 99.2 RR: 18- PT NOTED RESTING WITH EYES CLOSED AT TIME OF ASSESSMENT
No s/s of discomfort, no complains

## 2020-02-01 NOTE — CHART NOTE - NSCHARTNOTEFT_GEN_A_CORE
Source: Patient [ x]  Family [ ]   other [ x]    Current Diet: Diet, Consistent Carbohydrate w/Evening Snack:   1000mL Fluid Restriction ([id number]) (01-26-20 @ 14:13)      Patient reports [ ] nausea  [ ] vomiting [ ] diarrhea [ ] constipation  [ ]chewing problems [ ] swallowing issues  [ ] other:     PO intake:  < 50% [ x]   50-75%  [ ]   %  [ ]  other :    Source for PO intake [x ] Patient [ ] family [ ] chart [ ] staff [ ] other    Current Weight:   (1/30) 170.6 lbs  (1/24) 153 lbs  ? accuracy of weights, noted with 2+ dependent edema, continue to trend and maintain strict Is&Os     Pertinent Medications: MEDICATIONS  (STANDING):  atorvastatin 20 milliGRAM(s) Oral at bedtime  bisoprolol   Tablet 10 milliGRAM(s) Oral daily  calcium carbonate    500 mG (Tums) Chewable 1 Tablet(s) Chew two times a day  dextrose 5%. 1000 milliLiter(s) (50 mL/Hr) IV Continuous <Continuous>  dextrose 50% Injectable 12.5 Gram(s) IV Push once  dextrose 50% Injectable 25 Gram(s) IV Push once  dextrose 50% Injectable 25 Gram(s) IV Push once  ferrous    sulfate 325 milliGRAM(s) Oral daily  furosemide   Injectable 20 milliGRAM(s) IV Push daily  hydrocortisone 1% Cream 1 Application(s) Topical two times a day  insulin lispro (HumaLOG) corrective regimen sliding scale   SubCutaneous three times a day before meals  levoFLOXacin  Tablet 750 milliGRAM(s) Oral every 24 hours  methylPREDNISolone sodium succinate Injectable 40 milliGRAM(s) IV Push every 8 hours  polyethylene glycol 3350 17 Gram(s) Oral daily  potassium chloride    Tablet ER 20 milliEquivalent(s) Oral daily    MEDICATIONS  (PRN):  acetaminophen   Tablet .. 650 milliGRAM(s) Oral every 6 hours PRN Mild Pain (1 - 3)  acetaminophen   Tablet .. 650 milliGRAM(s) Oral every 6 hours PRN Temp greater or equal to 38C (100.4F)  albuterol/ipratropium for Nebulization 3 milliLiter(s) Nebulizer every 6 hours PRN Shortness of Breath and/or Wheezing  benzocaine 15 mG/menthol 3.6 mG (Sugar-Free) Lozenge 1 Lozenge Oral every 6 hours PRN Sore Throat  dextrose 40% Gel 15 Gram(s) Oral once PRN Blood Glucose LESS THAN 70 milliGRAM(s)/deciliter  diphenhydrAMINE 25 milliGRAM(s) Oral every 6 hours PRN Rash and/or Itching  glucagon  Injectable 1 milliGRAM(s) IntraMuscular once PRN Glucose LESS THAN 70 milligrams/deciliter  traMADol 25 milliGRAM(s) Oral every 6 hours PRN Moderate Pain (4 - 6)  traMADol 50 milliGRAM(s) Oral every 6 hours PRN Severe Pain (7 - 10)    Pertinent Labs: CBC Full  -  ( 31 Jan 2020 10:06 )  WBC Count : 16.33 K/uL  RBC Count : 3.94 M/uL  Hemoglobin : 9.5 g/dL  Hematocrit : 31.3 %  Platelet Count - Automated : 701 K/uL  Mean Cell Volume : 79.4 fl  Mean Cell Hemoglobin : 24.1 pg  Mean Cell Hemoglobin Concentration : 30.4 gm/dL  Auto Neutrophil # : 14.17 K/uL  Auto Lymphocyte # : 0.76 K/uL  Auto Monocyte # : 0.53 K/uL  Auto Eosinophil # : 0.53 K/uL  Auto Basophil # : 0.06 K/uL  Auto Neutrophil % : 86.8 %  Auto Lymphocyte % : 4.7 %  Auto Monocyte % : 3.2 %  Auto Eosinophil % : 3.2 %  Auto Basophil % : 0.4 %    Skin: Intact per documentation     Nutrition focused physical exam previously conducted - found signs of malnutrition [ ]absent [ x]present    Subcutaneous fat loss: [x ] Orbital fat pads region, [ x]Buccal fat region, [x ]Triceps region,  [ ]Ribs region    Muscle wasting: [x ]Temples region, [x ]Clavicle region, [x ]Shoulder region, [ ]Scapula region, [ ]Interosseous region,  [ ]thigh region, [ ]Calf region    Estimated Needs:   [ x] no change since previous assessment  [ ] recalculated:     Current Nutrition Diagnosis: Pt remains at high nutrition risk secondary to malnutrition (severe, chronic) related to inability to meet sufficient protein-energy in setting of increased needs associated with CHF and decreased appetite/po intake over the last year as evidenced by meeting <75% nutrient needs >1 month, mild muscle loss of temples and shoulders, moderate muscle loss of clavicles, moderate fat loss of buccal pads, orbitals, triceps, 19.5% wt loss x 1 year. Pt reports appetite/po intake remains poor. Last BM 1/28 per documentation.    Recommendations:   1) Add DASH/TLC to diet rx.  2) Add Glucerna TID to optimize po intake and provide an additional 220 kcal, 10g protein per serving.  3) Rx: MVI daily.   4) Encourage small, frequent meals and to make protein a priority.  5) Obtain daily weights to monitor trends.    Monitoring and Evaluation:   [ x] PO intake [x ] Tolerance to diet prescription [X] Weights  [X] Follow up per protocol [X] Labs

## 2020-02-01 NOTE — PROGRESS NOTE ADULT - SUBJECTIVE AND OBJECTIVE BOX
Internal Medicine Hospitalist Progress Note  follow up for PNA , CHF   she is seen earlier  today , daughter is at the bedside prefer to translate   pt is feeling better ,no new complaints , no SOB               ROS: as above, all remaining ROS are negative.       BACKGROUND:  MEDICATIONS  (STANDING):  atorvastatin 20 milliGRAM(s) Oral at bedtime  bisoprolol   Tablet 10 milliGRAM(s) Oral daily  calcium carbonate    500 mG (Tums) Chewable 1 Tablet(s) Chew two times a day  dextrose 5%. 1000 milliLiter(s) (50 mL/Hr) IV Continuous <Continuous>  dextrose 50% Injectable 12.5 Gram(s) IV Push once  dextrose 50% Injectable 25 Gram(s) IV Push once  dextrose 50% Injectable 25 Gram(s) IV Push once  ferrous    sulfate 325 milliGRAM(s) Oral daily  furosemide   Injectable 20 milliGRAM(s) IV Push daily  hydrocortisone 1% Cream 1 Application(s) Topical two times a day  insulin lispro (HumaLOG) corrective regimen sliding scale   SubCutaneous three times a day before meals  levoFLOXacin  Tablet 750 milliGRAM(s) Oral every 24 hours  methylPREDNISolone sodium succinate Injectable 40 milliGRAM(s) IV Push every 8 hours  polyethylene glycol 3350 17 Gram(s) Oral daily  potassium chloride    Tablet ER 20 milliEquivalent(s) Oral daily    MEDICATIONS  (PRN):  acetaminophen   Tablet .. 650 milliGRAM(s) Oral every 6 hours PRN Mild Pain (1 - 3)  acetaminophen   Tablet .. 650 milliGRAM(s) Oral every 6 hours PRN Temp greater or equal to 38C (100.4F)  albuterol/ipratropium for Nebulization 3 milliLiter(s) Nebulizer every 6 hours PRN Shortness of Breath and/or Wheezing  benzocaine 15 mG/menthol 3.6 mG (Sugar-Free) Lozenge 1 Lozenge Oral every 6 hours PRN Sore Throat  dextrose 40% Gel 15 Gram(s) Oral once PRN Blood Glucose LESS THAN 70 milliGRAM(s)/deciliter  diphenhydrAMINE 25 milliGRAM(s) Oral every 6 hours PRN Rash and/or Itching  glucagon  Injectable 1 milliGRAM(s) IntraMuscular once PRN Glucose LESS THAN 70 milligrams/deciliter  traMADol 25 milliGRAM(s) Oral every 6 hours PRN Moderate Pain (4 - 6)  traMADol 50 milliGRAM(s) Oral every 6 hours PRN Severe Pain (7 - 10)    Allergies    aspirin (Stomach Upset; Nausea)  penicillin (Angioedema)    Intolerances            VITALS:  Vital Signs Last 24 Hrs  T(C): 36.5 (01 Feb 2020 09:09), Max: 37 (01 Feb 2020 01:30)  T(F): 97.7 (01 Feb 2020 09:09), Max: 98.6 (01 Feb 2020 01:30)  HR: 105 (01 Feb 2020 09:09) (105 - 116)  BP: 106/70 (01 Feb 2020 09:09) (106/70 - 117/73)  BP(mean): 82 (01 Feb 2020 09:09) (82 - 88)  RR: 19 (01 Feb 2020 09:09) (18 - 19)  SpO2: 98% (01 Feb 2020 09:09) (98% - 98%) Daily     Daily   CAPILLARY BLOOD GLUCOSE      POCT Blood Glucose.: 205 mg/dL (01 Feb 2020 10:23)  POCT Blood Glucose.: 175 mg/dL (01 Feb 2020 09:19)  POCT Blood Glucose.: 139 mg/dL (31 Jan 2020 22:43)  POCT Blood Glucose.: 193 mg/dL (31 Jan 2020 18:21)  POCT Blood Glucose.: 205 mg/dL (31 Jan 2020 17:05)  POCT Blood Glucose.: 135 mg/dL (31 Jan 2020 14:05)  POCT Blood Glucose.: 173 mg/dL (31 Jan 2020 11:47)    I&O's Summary      PHYSICAL EXAM:      Constitutional: awake alert , no distress     Neck: supple , no JVD     Back: normal ROM ,     Respiratory: bilateral basilar rales , no wheezing     Cardiovascular: regular s1 /s2     Gastrointestinal: soft no tenderness , Bs positive     Extremities: no pretibial edema             LABS:                        9.5    16.33 )-----------( 701      ( 31 Jan 2020 10:06 )             31.3     01-31    129<L>  |  90<L>  |  19.0  ----------------------------<  133<H>  5.0   |  27.0  |  0.63    Ca    8.8      31 Jan 2020 10:06  Phos  3.5     01-31  Mg     1.9     01-31          Radiology :

## 2020-02-01 NOTE — PROVIDER CONTACT NOTE (OTHER) - RECOMMENDATIONS
MD coming to assess.
Lopressor IVP
RODRIGO MED PA MADE AWARE- NO ORDERS RECEIVED- WILL CONTINUE TO MONITOR PT AT THIS TIME.
Loppressor

## 2020-02-01 NOTE — PROVIDER CONTACT NOTE (OTHER) - SITUATION
PT NOTED TO BE TACHYCARDIC BY THIS RN
oxygenation:   At rest on room air 93%   Ambulating on room air 85%   Ambulating on 2Lpm 95%
pt complaint of chest pain radiating across chest.
pt has been sustaining HR of 122. Medicated one hour ago with Tylenol or generalized pain 8/10; pt now states pain level of 0/10. Denies pain or sob, or other distress.
pt oxygenation:   at rest on room air 96%  ambulating on room air 85%   ambulating on 2LPM 92%
pts HR at 144
Pts HR in 130s

## 2020-02-02 ENCOUNTER — RX RENEWAL (OUTPATIENT)
Age: 65
End: 2020-02-02

## 2020-02-02 LAB
ANION GAP SERPL CALC-SCNC: 12 MMOL/L — SIGNIFICANT CHANGE UP (ref 5–17)
BUN SERPL-MCNC: 44 MG/DL — HIGH (ref 8–20)
CALCIUM SERPL-MCNC: 9.2 MG/DL — SIGNIFICANT CHANGE UP (ref 8.6–10.2)
CHLORIDE SERPL-SCNC: 90 MMOL/L — LOW (ref 98–107)
CO2 SERPL-SCNC: 28 MMOL/L — SIGNIFICANT CHANGE UP (ref 22–29)
CREAT SERPL-MCNC: 0.75 MG/DL — SIGNIFICANT CHANGE UP (ref 0.5–1.3)
GLUCOSE BLDC GLUCOMTR-MCNC: 215 MG/DL — HIGH (ref 70–99)
GLUCOSE BLDC GLUCOMTR-MCNC: 216 MG/DL — HIGH (ref 70–99)
GLUCOSE BLDC GLUCOMTR-MCNC: 225 MG/DL — HIGH (ref 70–99)
GLUCOSE SERPL-MCNC: 214 MG/DL — HIGH (ref 70–99)
HCT VFR BLD CALC: 30.8 % — LOW (ref 34.5–45)
HGB BLD-MCNC: 9.1 G/DL — LOW (ref 11.5–15.5)
MCHC RBC-ENTMCNC: 23.3 PG — LOW (ref 27–34)
MCHC RBC-ENTMCNC: 29.5 GM/DL — LOW (ref 32–36)
MCV RBC AUTO: 78.8 FL — LOW (ref 80–100)
PLATELET # BLD AUTO: 692 K/UL — HIGH (ref 150–400)
POTASSIUM SERPL-MCNC: 5 MMOL/L — SIGNIFICANT CHANGE UP (ref 3.5–5.3)
POTASSIUM SERPL-SCNC: 5 MMOL/L — SIGNIFICANT CHANGE UP (ref 3.5–5.3)
RBC # BLD: 3.91 M/UL — SIGNIFICANT CHANGE UP (ref 3.8–5.2)
RBC # FLD: 18.3 % — HIGH (ref 10.3–14.5)
SODIUM SERPL-SCNC: 130 MMOL/L — LOW (ref 135–145)
WBC # BLD: 18.38 K/UL — HIGH (ref 3.8–10.5)
WBC # FLD AUTO: 18.38 K/UL — HIGH (ref 3.8–10.5)

## 2020-02-02 PROCEDURE — 99232 SBSQ HOSP IP/OBS MODERATE 35: CPT

## 2020-02-02 RX ADMIN — Medication 1 APPLICATION(S): at 20:24

## 2020-02-02 RX ADMIN — Medication 40 MILLIGRAM(S): at 22:16

## 2020-02-02 RX ADMIN — ENOXAPARIN SODIUM 40 MILLIGRAM(S): 100 INJECTION SUBCUTANEOUS at 14:01

## 2020-02-02 RX ADMIN — Medication 40 MILLIGRAM(S): at 15:43

## 2020-02-02 RX ADMIN — Medication 1 TABLET(S): at 17:57

## 2020-02-02 RX ADMIN — Medication 325 MILLIGRAM(S): at 14:00

## 2020-02-02 RX ADMIN — BENZOCAINE AND MENTHOL 1 LOZENGE: 5; 1 LIQUID ORAL at 15:43

## 2020-02-02 RX ADMIN — Medication 1 APPLICATION(S): at 06:36

## 2020-02-02 RX ADMIN — Medication 1 TABLET(S): at 06:32

## 2020-02-02 RX ADMIN — TRAMADOL HYDROCHLORIDE 50 MILLIGRAM(S): 50 TABLET ORAL at 06:32

## 2020-02-02 RX ADMIN — Medication 4: at 14:10

## 2020-02-02 RX ADMIN — Medication 20 MILLIEQUIVALENT(S): at 14:01

## 2020-02-02 RX ADMIN — ATORVASTATIN CALCIUM 20 MILLIGRAM(S): 80 TABLET, FILM COATED ORAL at 22:15

## 2020-02-02 RX ADMIN — BISOPROLOL FUMARATE 10 MILLIGRAM(S): 10 TABLET, FILM COATED ORAL at 06:32

## 2020-02-02 RX ADMIN — POLYETHYLENE GLYCOL 3350 17 GRAM(S): 17 POWDER, FOR SOLUTION ORAL at 14:00

## 2020-02-02 RX ADMIN — Medication 4: at 17:57

## 2020-02-02 RX ADMIN — Medication 20 MILLIGRAM(S): at 06:32

## 2020-02-02 RX ADMIN — Medication 40 MILLIGRAM(S): at 06:32

## 2020-02-02 RX ADMIN — Medication 4: at 09:16

## 2020-02-02 NOTE — ADVANCED PRACTICE NURSE CONSULT - ASSESSMENT
went to see pt in am. pt is a+ox3 c/o 0 pain resting comfortably in gerie chair. pt states she has diabetes for a few years she takes pills metformin 500 mg 2xdaily. she has a glucometer and test bg in am. she gets numbers between 110-130. she fu w pcp. pt was advised to ambulate as tolerated while in the hospital in order to improve glycemic control. pt and family verbalized understanding

## 2020-02-02 NOTE — PROGRESS NOTE ADULT - SUBJECTIVE AND OBJECTIVE BOX
Internal Medicine Hospitalist Progress Note  follow up for PNA , CHF   she is seen in am , with Japanese speaking staff at the bedside   she is feeling well, sitting in the chair , no chest pain            Vital Signs Last 24 Hrs  T(C): 36.6 (02 Feb 2020 07:46), Max: 36.7 (02 Feb 2020 00:32)  T(F): 97.8 (02 Feb 2020 07:46), Max: 98.1 (02 Feb 2020 00:32)  HR: 91 (02 Feb 2020 07:46) (91 - 108)  BP: 114/68 (02 Feb 2020 07:46) (104/68 - 123/81)  BP(mean): 83 (01 Feb 2020 12:35) (83 - 83)  RR: 18 (02 Feb 2020 07:46) (18 - 20)  SpO2: 96% (02 Feb 2020 07:46) (96% - 97%)          PHYSICAL EXAM:      Constitutional: awake alert , no distress     Neck: supple , no JVD     Respiratory: bilateral basilar rales , no wheezing     Cardiovascular: regular s1 /s2     Gastrointestinal: soft no tenderness , Bs positive     Extremities: no pretibial edema                                 9.1    18.38 )-----------( 692      ( 02 Feb 2020 08:11 )             30.8   02-02    130<L>  |  90<L>  |  44.0<H>  ----------------------------<  214<H>  5.0   |  28.0  |  0.75    Ca    9.2      02 Feb 2020 08:11        Radiology :

## 2020-02-03 ENCOUNTER — TRANSCRIPTION ENCOUNTER (OUTPATIENT)
Age: 65
End: 2020-02-03

## 2020-02-03 LAB
GLUCOSE BLDC GLUCOMTR-MCNC: 187 MG/DL — HIGH (ref 70–99)
GLUCOSE BLDC GLUCOMTR-MCNC: 263 MG/DL — HIGH (ref 70–99)
GLUCOSE BLDC GLUCOMTR-MCNC: 269 MG/DL — HIGH (ref 70–99)

## 2020-02-03 PROCEDURE — 99232 SBSQ HOSP IP/OBS MODERATE 35: CPT

## 2020-02-03 RX ORDER — POTASSIUM CHLORIDE 20 MEQ
1 PACKET (EA) ORAL
Qty: 0 | Refills: 0 | DISCHARGE

## 2020-02-03 RX ORDER — FERROUS SULFATE 325(65) MG
1 TABLET ORAL
Qty: 30 | Refills: 0
Start: 2020-02-03 | End: 2020-03-03

## 2020-02-03 RX ORDER — POLYETHYLENE GLYCOL 3350 17 G/17G
17 POWDER, FOR SOLUTION ORAL
Qty: 0 | Refills: 0 | DISCHARGE
Start: 2020-02-03

## 2020-02-03 RX ORDER — FUROSEMIDE 40 MG
1 TABLET ORAL
Qty: 30 | Refills: 0
Start: 2020-02-03 | End: 2020-03-03

## 2020-02-03 RX ORDER — CIPROFLOXACIN LACTATE 400MG/40ML
1 VIAL (ML) INTRAVENOUS
Qty: 2 | Refills: 0
Start: 2020-02-03 | End: 2020-02-04

## 2020-02-03 RX ORDER — CALCIUM CARBONATE 500(1250)
1 TABLET ORAL
Qty: 0 | Refills: 0 | DISCHARGE
Start: 2020-02-03

## 2020-02-03 RX ORDER — CHOLECALCIFEROL (VITAMIN D3) 125 MCG
1 CAPSULE ORAL
Qty: 0 | Refills: 0 | DISCHARGE

## 2020-02-03 RX ADMIN — Medication 1 APPLICATION(S): at 21:04

## 2020-02-03 RX ADMIN — Medication 2: at 17:39

## 2020-02-03 RX ADMIN — Medication 325 MILLIGRAM(S): at 13:54

## 2020-02-03 RX ADMIN — Medication 1 TABLET(S): at 05:46

## 2020-02-03 RX ADMIN — Medication 40 MILLIGRAM(S): at 05:47

## 2020-02-03 RX ADMIN — Medication 40 MILLIGRAM(S): at 21:03

## 2020-02-03 RX ADMIN — Medication 1 TABLET(S): at 17:40

## 2020-02-03 RX ADMIN — ENOXAPARIN SODIUM 40 MILLIGRAM(S): 100 INJECTION SUBCUTANEOUS at 13:54

## 2020-02-03 RX ADMIN — Medication 6: at 10:10

## 2020-02-03 RX ADMIN — Medication 1 APPLICATION(S): at 05:47

## 2020-02-03 RX ADMIN — ATORVASTATIN CALCIUM 20 MILLIGRAM(S): 80 TABLET, FILM COATED ORAL at 21:04

## 2020-02-03 RX ADMIN — Medication 20 MILLIGRAM(S): at 05:45

## 2020-02-03 RX ADMIN — BISOPROLOL FUMARATE 10 MILLIGRAM(S): 10 TABLET, FILM COATED ORAL at 05:46

## 2020-02-03 RX ADMIN — Medication 6: at 13:54

## 2020-02-03 RX ADMIN — Medication 40 MILLIGRAM(S): at 14:02

## 2020-02-03 RX ADMIN — Medication 20 MILLIEQUIVALENT(S): at 13:54

## 2020-02-03 NOTE — DISCHARGE NOTE PROVIDER - HOSPITAL COURSE
The patient is a 64 year old female with a history of rheumatoid arthritis on DMARD agents and prednisone, diabetes mellitus type 2, hypertension and chronic iron deficiency anemia who was referred to the ER by her PMD for complaints of difficulty breathing. According to the patient, she was admitted to [facility] from 12/17-12/20 for anemia. She was transfused 2 units of PRBC and discharged. SInce being discharged from the hospital she has had complaints of progressively worsening dyspnea on exertion associated with lower extremity edema. She was evaluated by her PMD today who referred her to the ER for evaluation. In the ER, noted to have SPo2 of 91% on room air improved with NC. Labs positive for a BNP of 1079 and troponin of 0.10. Patient denies complaints of chest pain, had stress test 3 months ago which she states was normal. Chest xray consistent with cardiomegaly and pulmonary edema. Admitted for acute CHF. CT with evidence of PNA; started on abx 1/29 , doing better , requires home oxygen arranged     She is stable for discharge spend 35 min coordinating care

## 2020-02-03 NOTE — DISCHARGE NOTE PROVIDER - CARE PROVIDER_API CALL
Kay Sales)  Cardiovascular Disease; Internal Medicine  [address], [address]  [place] [address]  Phone: [phone]  Fax: [phone]  Follow Up Time: 1 week    Viral Sanchez)  Critical Care Medicine; Internal Medicine; Pulmonary Disease  [address], [address]  [place] [address]  Phone: [phone]  Fax: [phone]  Follow Up Time: 2 weeks

## 2020-02-03 NOTE — DISCHARGE NOTE PROVIDER - PROVIDER TOKENS
PROVIDER:[TOKEN:[[id number]],FOLLOWUP:[1 week]],PROVIDER:[TOKEN:[[id number]],FOLLOWUP:[2 weeks]]

## 2020-02-03 NOTE — DISCHARGE NOTE PROVIDER - NSDCMRMEDTOKEN_GEN_ALL_CORE_FT
bisoprolol-hydrochlorothiazide 10 mg-6.25 mg oral tablet: 1 tab(s) orally once a day  calcium carbonate 500 mg (200 mg elemental calcium) oral tablet, chewable: 1 tab(s) orally 2 times a day  ferrous sulfate 325 mg (65 mg elemental iron) oral tablet: 1 tab(s) orally once a day   Humira 20 mg/0.2 mL subcutaneous kit:   Lasix 20 mg oral tablet: 1 tab(s) orally once a day   Levaquin 500 mg oral tablet: 1 tab(s) orally every 24 hours   metFORMIN 850 mg oral tablet: 1 tab(s) orally 2 times a day  polyethylene glycol 3350 oral powder for reconstitution: 17 gram(s) orally once a day  predniSONE 10 mg oral tablet: 5 tab(s) oral - orally once a day x 2 days  4 tab(s) oral - orally once a day x 2 days  3 tab(s) oral - orally once a day x 2 days  2 tab(s) oral - orally once a day x 15  Rinvoq 15 mg oral tablet, extended release: 1 tab(s) orally once a day albuterol 90 mcg/inh inhalation aerosol: 2 puff(s) inhaled every 6 hours   bisoprolol-hydrochlorothiazide 10 mg-6.25 mg oral tablet: 1 tab(s) orally once a day  calcium carbonate 500 mg (200 mg elemental calcium) oral tablet, chewable: 1 tab(s) orally 2 times a day  ferrous sulfate 325 mg (65 mg elemental iron) oral tablet: 1 tab(s) orally once a day   Humira 20 mg/0.2 mL subcutaneous kit:   Lasix 20 mg oral tablet: 1 tab(s) orally once a day   Levaquin 500 mg oral tablet: 1 tab(s) orally every 24 hours   metFORMIN 850 mg oral tablet: 1 tab(s) orally 2 times a day  polyethylene glycol 3350 oral powder for reconstitution: 17 gram(s) orally once a day  potassium chloride 20 mEq oral tablet, extended release: 1 tab(s) orally once a day  predniSONE 10 mg oral tablet: 5 tab(s) oral - orally once a day x 2 days  4 tab(s) oral - orally once a day x 2 days  3 tab(s) oral - orally once a day x 2 days  2 tab(s) oral - orally once a day x 15  Rinvoq 15 mg oral tablet, extended release: 1 tab(s) orally once a day

## 2020-02-03 NOTE — DISCHARGE NOTE PROVIDER - NSDCFUSCHEDAPPT_GEN_ALL_CORE_FT
MELY LIN ; 02/28/2020 ; [facility] [address] MELY LIN ; 02/28/2020 ; [facility] [address] MEYL LIN ; 02/28/2020 ; [facility] [address]

## 2020-02-03 NOTE — PROGRESS NOTE ADULT - SUBJECTIVE AND OBJECTIVE BOX
PULMONARY PROGRESS NOTE      VALENTINA LINROSANNE-[id number]    Patient is a 64y old  Female who presents with a chief complaint of Difficulty breathing (02 Feb 2020 10:37)      INTERVAL HPI/OVERNIGHT EVENTS:  Much improved  Ambulating well in halls on nasal O2  Minimal cough    MEDICATIONS  (STANDING):  atorvastatin 20 milliGRAM(s) Oral at bedtime  bisoprolol   Tablet 10 milliGRAM(s) Oral daily  calcium carbonate    500 mG (Tums) Chewable 1 Tablet(s) Chew two times a day  dextrose 5%. 1000 milliLiter(s) (50 mL/Hr) IV Continuous <Continuous>  dextrose 50% Injectable 12.5 Gram(s) IV Push once  dextrose 50% Injectable 25 Gram(s) IV Push once  dextrose 50% Injectable 25 Gram(s) IV Push once  enoxaparin Injectable 40 milliGRAM(s) SubCutaneous daily  ferrous    sulfate 325 milliGRAM(s) Oral daily  furosemide   Injectable 20 milliGRAM(s) IV Push daily  hydrocortisone 1% Cream 1 Application(s) Topical two times a day  insulin lispro (HumaLOG) corrective regimen sliding scale   SubCutaneous three times a day before meals  levoFLOXacin  Tablet 750 milliGRAM(s) Oral every 24 hours  methylPREDNISolone sodium succinate Injectable 40 milliGRAM(s) IV Push every 8 hours  polyethylene glycol 3350 17 Gram(s) Oral daily  potassium chloride    Tablet ER 20 milliEquivalent(s) Oral daily      MEDICATIONS  (PRN):  acetaminophen   Tablet .. 650 milliGRAM(s) Oral every 6 hours PRN Mild Pain (1 - 3)  acetaminophen   Tablet .. 650 milliGRAM(s) Oral every 6 hours PRN Temp greater or equal to 38C (100.4F)  albuterol/ipratropium for Nebulization 3 milliLiter(s) Nebulizer every 6 hours PRN Shortness of Breath and/or Wheezing  benzocaine 15 mG/menthol 3.6 mG (Sugar-Free) Lozenge 1 Lozenge Oral every 6 hours PRN Sore Throat  dextrose 40% Gel 15 Gram(s) Oral once PRN Blood Glucose LESS THAN 70 milliGRAM(s)/deciliter  diphenhydrAMINE 25 milliGRAM(s) Oral every 6 hours PRN Rash and/or Itching  glucagon  Injectable 1 milliGRAM(s) IntraMuscular once PRN Glucose LESS THAN 70 milligrams/deciliter  traMADol 25 milliGRAM(s) Oral every 6 hours PRN Moderate Pain (4 - 6)  traMADol 50 milliGRAM(s) Oral every 6 hours PRN Severe Pain (7 - 10)      Allergies    aspirin (Stomach Upset; Nausea)  penicillin (Angioedema)    Intolerances        PAST MEDICAL & SURGICAL HISTORY:  Anemia  Rheumatoid arthritis  Essential hypertension  No significant past surgical history      SOCIAL HISTORY  Smoking History: Nonsmoker      REVIEW OF SYSTEMS:    CONSTITUTIONAL:  No distress    HEENT:  Eyes:  No diplopia or blurred vision. ENT:  No earache, sore throat or runny nose.    CARDIOVASCULAR:  No pressure, squeezing, tightness, heaviness or aching about the chest; no palpitations.    RESPIRATORY: Per HPI    GASTROINTESTINAL:  No nausea, vomiting or diarrhea.    GENITOURINARY:  No dysuria, frequency or urgency.    MUSCULOSKELETAL:  No joint pain    SKIN:  No new lesions.    NEUROLOGIC:  No paresthesias, fasciculations, seizures or weakness.    PSYCHIATRIC:  No disorder of thought or mood.    ENDOCRINE:  No heat or cold intolerance, polyuria or polydipsia.    HEMATOLOGICAL:  No easy bruising or bleeding.     Vital Signs Last 24 Hrs  T(C): 36.6 (03 Feb 2020 08:18), Max: 36.6 (02 Feb 2020 23:50)  T(F): 97.8 (03 Feb 2020 08:18), Max: 97.8 (02 Feb 2020 23:50)  HR: 101 (03 Feb 2020 08:18) (101 - 107)  BP: 110/64 (03 Feb 2020 08:18) (110/64 - 120/80)  BP(mean): --  RR: 19 (03 Feb 2020 08:18) (18 - 19)  SpO2: 100% (03 Feb 2020 08:18) (96% - 100%)    PHYSICAL EXAMINATION:    GENERAL: The patient is awake and alert in no apparent distress.     HEENT: Head is normocephalic and atraumatic. Extraocular muscles are intact. Mucous membranes are moist.    NECK: Supple.    LUNGS: Clear to auscultation without wheezing, rales or rhonchi; respirations unlabored    HEART: Regular rate and rhythm without murmur.    ABDOMEN: Soft, nontender, and nondistended.      EXTREMITIES: Without any cyanosis, clubbing, rash, lesions or edema.    NEUROLOGIC: Grossly intact.    SKIN: No ulceration or induration present.      LABS:                        9.1    18.38 )-----------( 692      ( 02 Feb 2020 08:11 )             30.8     02-02    130<L>  |  90<L>  |  44.0<H>  ----------------------------<  214<H>  5.0   |  28.0  |  0.75    Ca    9.2      02 Feb 2020 08:11                          MICROBIOLOGY:    RADIOLOGY & ADDITIONAL STUDIES:

## 2020-02-03 NOTE — PROGRESS NOTE ADULT - SUBJECTIVE AND OBJECTIVE BOX
Internal Medicine Hospitalist Progress Note  follow up for PNA , CHF   she is seen in am , daughter is at the bedside , she feels better   no SOB , no chest pain          Vital Signs Last 24 Hrs  T(C): 36.7 (03 Feb 2020 15:15), Max: 36.7 (03 Feb 2020 15:15)  T(F): 98 (03 Feb 2020 15:15), Max: 98 (03 Feb 2020 15:15)  HR: 107 (03 Feb 2020 15:15) (101 - 107)  BP: 131/76 (03 Feb 2020 15:15) (110/64 - 131/76)  BP(mean): --  RR: 18 (03 Feb 2020 15:15) (18 - 19)  SpO2: 96% (03 Feb 2020 15:15) (96% - 100%)      PHYSICAL EXAM:      Constitutional: awake alert , no distress     Respiratory: bilateral basilar rales , no wheezing     Cardiovascular: regular s1 /s2     Gastrointestinal: soft no tenderness , Bs positive     Extremities: no pretibial edema                                 9.1    18.38 )-----------( 692      ( 02 Feb 2020 08:11 )             30.8   02-02    130<L>  |  90<L>  |  44.0<H>  ----------------------------<  214<H>  5.0   |  28.0  |  0.75    Ca    9.2      02 Feb 2020 08:11        Radiology :

## 2020-02-03 NOTE — DISCHARGE NOTE PROVIDER - NSDCCPCAREPLAN_GEN_ALL_CORE_FT
PRINCIPAL DISCHARGE DIAGNOSIS  Diagnosis: CHF (congestive heart failure)  Assessment and Plan of Treatment: continue lasix po , see the cardiologist      SECONDARY DISCHARGE DIAGNOSES  Diagnosis: Respiratory failure  Assessment and Plan of Treatment: continue oxygen    Diagnosis: Pneumonia  Assessment and Plan of Treatment: complete  course of therapy   follow up with pulmonologist and cont to ue oxygen as needed

## 2020-02-04 ENCOUNTER — TRANSCRIPTION ENCOUNTER (OUTPATIENT)
Age: 65
End: 2020-02-04

## 2020-02-04 VITALS
TEMPERATURE: 98 F | RESPIRATION RATE: 18 BRPM | SYSTOLIC BLOOD PRESSURE: 109 MMHG | HEART RATE: 95 BPM | OXYGEN SATURATION: 98 % | DIASTOLIC BLOOD PRESSURE: 66 MMHG

## 2020-02-04 PROCEDURE — 99285 EMERGENCY DEPT VISIT HI MDM: CPT | Mod: 25

## 2020-02-04 PROCEDURE — 87486 CHLMYD PNEUM DNA AMP PROBE: CPT

## 2020-02-04 PROCEDURE — 83935 ASSAY OF URINE OSMOLALITY: CPT

## 2020-02-04 PROCEDURE — 80061 LIPID PANEL: CPT

## 2020-02-04 PROCEDURE — 36415 COLL VENOUS BLD VENIPUNCTURE: CPT

## 2020-02-04 PROCEDURE — P9016: CPT

## 2020-02-04 PROCEDURE — 84443 ASSAY THYROID STIM HORMONE: CPT

## 2020-02-04 PROCEDURE — 71046 X-RAY EXAM CHEST 2 VIEWS: CPT

## 2020-02-04 PROCEDURE — 93005 ELECTROCARDIOGRAM TRACING: CPT

## 2020-02-04 PROCEDURE — 85610 PROTHROMBIN TIME: CPT

## 2020-02-04 PROCEDURE — 85027 COMPLETE CBC AUTOMATED: CPT

## 2020-02-04 PROCEDURE — 83880 ASSAY OF NATRIURETIC PEPTIDE: CPT

## 2020-02-04 PROCEDURE — 86850 RBC ANTIBODY SCREEN: CPT

## 2020-02-04 PROCEDURE — 87798 DETECT AGENT NOS DNA AMP: CPT

## 2020-02-04 PROCEDURE — 97116 GAIT TRAINING THERAPY: CPT

## 2020-02-04 PROCEDURE — 82533 TOTAL CORTISOL: CPT

## 2020-02-04 PROCEDURE — 86900 BLOOD TYPING SEROLOGIC ABO: CPT

## 2020-02-04 PROCEDURE — 86923 COMPATIBILITY TEST ELECTRIC: CPT

## 2020-02-04 PROCEDURE — 36430 TRANSFUSION BLD/BLD COMPNT: CPT

## 2020-02-04 PROCEDURE — 84300 ASSAY OF URINE SODIUM: CPT

## 2020-02-04 PROCEDURE — 80048 BASIC METABOLIC PNL TOTAL CA: CPT

## 2020-02-04 PROCEDURE — T1013: CPT

## 2020-02-04 PROCEDURE — 83735 ASSAY OF MAGNESIUM: CPT

## 2020-02-04 PROCEDURE — 87040 BLOOD CULTURE FOR BACTERIA: CPT

## 2020-02-04 PROCEDURE — 83036 HEMOGLOBIN GLYCOSYLATED A1C: CPT

## 2020-02-04 PROCEDURE — 82728 ASSAY OF FERRITIN: CPT

## 2020-02-04 PROCEDURE — 84484 ASSAY OF TROPONIN QUANT: CPT

## 2020-02-04 PROCEDURE — 93970 EXTREMITY STUDY: CPT

## 2020-02-04 PROCEDURE — 71045 X-RAY EXAM CHEST 1 VIEW: CPT

## 2020-02-04 PROCEDURE — 97110 THERAPEUTIC EXERCISES: CPT

## 2020-02-04 PROCEDURE — 84145 PROCALCITONIN (PCT): CPT

## 2020-02-04 PROCEDURE — 86803 HEPATITIS C AB TEST: CPT

## 2020-02-04 PROCEDURE — 86901 BLOOD TYPING SEROLOGIC RH(D): CPT

## 2020-02-04 PROCEDURE — 82803 BLOOD GASES ANY COMBINATION: CPT

## 2020-02-04 PROCEDURE — 87581 M.PNEUMON DNA AMP PROBE: CPT

## 2020-02-04 PROCEDURE — 83930 ASSAY OF BLOOD OSMOLALITY: CPT

## 2020-02-04 PROCEDURE — 93306 TTE W/DOPPLER COMPLETE: CPT

## 2020-02-04 PROCEDURE — 97530 THERAPEUTIC ACTIVITIES: CPT

## 2020-02-04 PROCEDURE — 81001 URINALYSIS AUTO W/SCOPE: CPT

## 2020-02-04 PROCEDURE — 96374 THER/PROPH/DIAG INJ IV PUSH: CPT

## 2020-02-04 PROCEDURE — 99239 HOSP IP/OBS DSCHRG MGMT >30: CPT

## 2020-02-04 PROCEDURE — 80053 COMPREHEN METABOLIC PANEL: CPT

## 2020-02-04 PROCEDURE — 82272 OCCULT BLD FECES 1-3 TESTS: CPT

## 2020-02-04 PROCEDURE — 83540 ASSAY OF IRON: CPT

## 2020-02-04 PROCEDURE — 71275 CT ANGIOGRAPHY CHEST: CPT

## 2020-02-04 PROCEDURE — 87633 RESP VIRUS 12-25 TARGETS: CPT

## 2020-02-04 PROCEDURE — 87449 NOS EACH ORGANISM AG IA: CPT

## 2020-02-04 PROCEDURE — 83550 IRON BINDING TEST: CPT

## 2020-02-04 PROCEDURE — 84550 ASSAY OF BLOOD/URIC ACID: CPT

## 2020-02-04 PROCEDURE — 84100 ASSAY OF PHOSPHORUS: CPT

## 2020-02-04 PROCEDURE — 84466 ASSAY OF TRANSFERRIN: CPT

## 2020-02-04 PROCEDURE — 85730 THROMBOPLASTIN TIME PARTIAL: CPT

## 2020-02-04 PROCEDURE — 82962 GLUCOSE BLOOD TEST: CPT

## 2020-02-04 PROCEDURE — 94640 AIRWAY INHALATION TREATMENT: CPT

## 2020-02-04 RX ORDER — ALBUTEROL 90 UG/1
2 AEROSOL, METERED ORAL
Qty: 1 | Refills: 0
Start: 2020-02-04 | End: 2020-03-04

## 2020-02-04 RX ORDER — POTASSIUM CHLORIDE 20 MEQ
1 PACKET (EA) ORAL
Qty: 0 | Refills: 0 | DISCHARGE
Start: 2020-02-04

## 2020-02-04 RX ADMIN — BISOPROLOL FUMARATE 10 MILLIGRAM(S): 10 TABLET, FILM COATED ORAL at 05:21

## 2020-02-04 RX ADMIN — Medication 40 MILLIGRAM(S): at 05:21

## 2020-02-04 RX ADMIN — Medication 1 TABLET(S): at 05:21

## 2020-02-04 RX ADMIN — Medication 20 MILLIGRAM(S): at 05:21

## 2020-02-04 RX ADMIN — Medication 1 APPLICATION(S): at 05:30

## 2020-02-04 NOTE — PROGRESS NOTE ADULT - ASSESSMENT
The patient is a 64 year old female with a history of rheumatoid arthritis on DMARD agents and prednisone, diabetes mellitus type 2, hypertension and chronic iron deficiency anemia who was referred to the ER by her PMD for complaints of difficulty breathing. According to the patient, she was admitted to [facility] from 12/17-12/20 for anemia. She was transfused 2 units of PRBC and discharged. SInce being discharged from the hospital she has had complaints of progressively worsening dyspnea on exertion associated with lower extremity edema. She was evaluated by her PMD today who referred her to the ER for evaluation. In the ER, noted to have SPo2 of 91% on room air improved with NC. Labs positive for a BNP of 1079 and troponin of 0.10. Patient denies complaints of chest pain, had stress test 3 months ago which she states was normal. Chest xray consistent with cardiomegaly and pulmonary edema. Admitted for acute CHF.     Assessment/Plan:    1. Acute CHF: EF unknown  IV lasix 40mg daily  Daily weight  Monitor I&Os  Echocardiogram pending   Cardiology evaluation requested    2. Elevated troponin: Trend cardiac enzymes  Denies complaints of chest pain  EKG with no acute changes  Cardio following. will need eventual ischemic work up    3. Anemia on chronic disease: Monitor HB/hct    4. Hyponatremia  likely secondary to volume overload  MOnitor BMP    5. RA: Continue prednisone PO    6. Hypertension: Metoprolol PO   MOnitor BP    VTE_ Heparin subcut
S/P respiratory failure currently improved with need for O2.  Issue remains as to etiology of decompensation: ILD vs infection.  Suspect infection a primary issue but will need further evaluation as outpatient.  At this point can be d/c'd from a pulmonary point of view    Plan:  1.Maintain 20 mg prednisone until further followup   2.O2 via nasal cannula 24/7>will evaluate need for O2 as outpatient  3.Will need f/u CT of chest in about 4-6 weeks provided she continues to do well  4.F/u with us post discharge >needs PFT's  Discussed with medical team.
The patient is a 64 year old female with a history of rheumatoid arthritis on DMARD agents and prednisone, diabetes mellitus type 2, hypertension and chronic iron deficiency anemia who was referred to the ER by her PMD for complaints of difficulty breathing. According to the patient, she was admitted to [facility] from 12/17-12/20 for anemia. She was transfused 2 units of PRBC and discharged. SInce being discharged from the hospital she has had complaints of progressively worsening dyspnea on exertion associated with lower extremity edema. She was evaluated by her PMD today who referred her to the ER for evaluation. In the ER, noted to have SPo2 of 91% on room air improved with NC. Labs positive for a BNP of 1079 and troponin of 0.10. Patient denies complaints of chest pain, had stress test 3 months ago which she states was normal. Chest xray consistent with cardiomegaly and pulmonary edema. Admitted for acute CHF. CT with evidence of PNA; started on abx.    1. Acute Hypoxic Respiratory Failure likely due to Acute Diastolic Heart Failure and Multifocal PNA (suspected gram negative organism)  -appears euvolemic; hold lasix due to hyponatremia  -CTA reviewed; evidence of pneumonia and scarring/fibrosis (possibly from RA)  -Worsening leukocytosis noted; procal mildly elevated. ID consult appreciated; switched to Levaquin to complete a 7-10 day course  -blood cultures no growth to date  -Negative  RVP  -f/u urine legionella; still pending  -TTE reviewed; preserved EF  -Outpatient pulmonary referral upon discharge  -Pulse ox 92% on room air at rest and 85% on room air upon ambulation; will need home O2    2. Elevated troponin likely due to demand ischemia  history of CAD; intolerant to ASA  started on statin; lipid panel reviewed  negative outpatient nuclear stress test  Denies complaints of chest pain at this time  Repeat TNI 0.12 --> 0.11  EKG with Sinus Tachy and no acute ST- T changes  Cardio follow up appreciated    3. Anemia on chronic disease  -Hb stable s/p 1 unit of PRBC  -occult blood negative  -iron studies reviewed; start ferrous sulfate  -monitor H/H    4. Hyponatremia    -sodium 132 --> 129  -orthostatics negative; hold lasix for now  -check uric acid, serum osm  -awaiting urine studies  -free water restriction discussed with patient and family in detail  -normal TSH  -monitor strict I/Os  -further management pending urine studies    5. Rheumatoid Arthritis - Continue prednisone PO. Outpatient Rheum follow up. PT evaluation noted.    6. Hypertension: Bp stable; continue bisoprolol.    7. Sinus tachycardia- improved; metoprolol changed to bisoprolol. Continue telemonitoring. Cardio follow up appreciated.    DVT ppx - SCDs due to anemia      Attending Attestation:   Plan discussed with patient, family at bedside, RN, NP.
The patient is a 64 year old female with a history of rheumatoid arthritis on DMARD agents and prednisone, diabetes mellitus type 2, hypertension and chronic iron deficiency anemia who was referred to the ER by her PMD for complaints of difficulty breathing. According to the patient, she was admitted to [facility] from 12/17-12/20 for anemia. She was transfused 2 units of PRBC and discharged. SInce being discharged from the hospital she has had complaints of progressively worsening dyspnea on exertion associated with lower extremity edema. She was evaluated by her PMD today who referred her to the ER for evaluation. In the ER, noted to have SPo2 of 91% on room air improved with NC. Labs positive for a BNP of 1079 and troponin of 0.10. Patient denies complaints of chest pain, had stress test 3 months ago which she states was normal. Chest xray consistent with cardiomegaly and pulmonary edema. Admitted for acute CHF. CT with evidence of PNA; started on abx.    1. Acute Hypoxic Respiratory Failure likely due to Acute Diastolic Heart Failure and Multifocal PNA (suspected gram negative organism)  -BNP elevated. Check ABG and CXR. Start IV lasix and IV solumedrol for possible underlying ILD.   -CTA reviewed; evidence of pneumonia and scarring/fibrosis (possibly from RA)  -Worsening leukocytosis noted; procal mildly elevated. ID consult appreciated; switched to Levaquin to complete a 7-10 day course  -blood cultures no growth to date  -Negative  RVP  -f/u urine legionella pending  -TTE reviewed; preserved EF  -Outpatient pulmonary referral upon discharge  -Pulse ox 92% on room air at rest and 85% on room air upon ambulation; [facility] arranging home O2 (paperwork submitted)  -Pulmonary evaluation giving worsening WALKER    2. Elevated troponin likely due to demand ischemia  history of CAD; intolerant to ASA  started on statin; lipid panel reviewed  negative outpatient nuclear stress test  Denies complaints of chest pain at this time  Repeat TNI 0.12 --> 0.11  EKG with Sinus Tachy and no acute ST- T changes  Cardio follow up appreciated    3. Anemia on chronic disease  -Hb stable s/p 1 unit of PRBC  -occult blood negative  -iron studies reviewed; start ferrous sulfate  -monitor H/H    4. Hyponatremia    -sodium 132 --> 129  -orthostatics negative  -resume IV lasix  -urine studies reviewed  -free water restriction discussed with patient and family in detail  -normal TSH  -monitor strict I/Os    5. Rheumatoid Arthritis - On chronic prednisone; hold while on solumedrol. Outpatient Rheum follow up. PT evaluation noted.    6. Hypertension: Bp stable; continue bisoprolol.    7. Sinus tachycardia- improved; metoprolol changed to bisoprolol. Continue telemonitoring. Cardio follow up appreciated.    DVT ppx - Lovenox SC    Attending Attestation:   Plan discussed with patient, family at bedside, RN, NP    Dispo - Home in 2-3 days pending improvement in respiratory status and arrangement of home O2.
The patient is a 64 year old female with a history of rheumatoid arthritis on DMARD agents and prednisone, diabetes mellitus type 2, hypertension and chronic iron deficiency anemia who was referred to the ER by her PMD for complaints of difficulty breathing. According to the patient, she was admitted to [facility] from 12/17-12/20 for anemia. She was transfused 2 units of PRBC and discharged. SInce being discharged from the hospital she has had complaints of progressively worsening dyspnea on exertion associated with lower extremity edema. She was evaluated by her PMD today who referred her to the ER for evaluation. In the ER, noted to have SPo2 of 91% on room air improved with NC. Labs positive for a BNP of 1079 and troponin of 0.10. Patient denies complaints of chest pain, had stress test 3 months ago which she states was normal. Chest xray consistent with cardiomegaly and pulmonary edema. Admitted for acute CHF. CT with evidence of PNA; started on abx 1/29 , doing better     1- PNA - likely gr positive and gr neg organism   cont levaquin discharge home in 48 hours likely po     2-.Acute Hypoxic Respiratory Failure likely due to Acute Diastolic Heart Failure and Multifocal PNA (suspected gram negative organism)  -BNP elevated.   on iv lasix and iv steroid now   taper quickly     3. Elevated troponin likely due to demand ischemia  history of CAD with  intolerant to ASA  started on statin  negative outpatient nuclear stress test
The patient is a 64 year old female with a history of rheumatoid arthritis on DMARD agents and prednisone, diabetes mellitus type 2, hypertension and chronic iron deficiency anemia who was referred to the ER by her PMD for complaints of difficulty breathing. According to the patient, she was admitted to [facility] from 12/17-12/20 for anemia. She was transfused 2 units of PRBC and discharged. SInce being discharged from the hospital she has had complaints of progressively worsening dyspnea on exertion associated with lower extremity edema. She was evaluated by her PMD today who referred her to the ER for evaluation. In the ER, noted to have SPo2 of 91% on room air improved with NC. Labs positive for a BNP of 1079 and troponin of 0.10. Patient denies complaints of chest pain, had stress test 3 months ago which she states was normal. Chest xray consistent with cardiomegaly and pulmonary edema. Admitted for acute CHF. CT with evidence of PNA; started on abx     1. Acute Hypoxic Respiratory Failure likely due to Acute Diastolic Heart Failure and Multifocal PNA (suspected gram negative organism)  -PO lasix  -CTA reviewed; evidence of pneumonia and scarring/fibrosis (possibly from RA)  -Worsening leukocytosis and low grade fever noted  -Check blood cultures x 2 from 1/26 pending  -Negative  RVP  -Check urine legionella  -Aztreonam given PCN allergy  -TTE reviewed; preserved EF  -Continue Supplemental O2 and bronchodilators as needed  -Outpatient pulmonary referral    2. Elevated troponin likely due to demand ischemia  history of CAD; intolerant to ASA  started on statin; lipid panel reviewed  negative outpatient nuclear stress test  Denies complaints of chest pain at this time  Repeat TNI 0.12 --> 0.11  EKG with no acute ST- T changes  f/u further Cardio recommendations     3. Anemia on chronic disease  -Hb 7.8; check occult blood  -transfuse one unit PRBC, give lasix post transfuion, monitor CBC    4. Hyponatremia - initially believed to be due to hypervolemia  -Improved with free water restriction  -Continue lasix to PO  -check Urine studies  -normal TSH  -strict I/Os  -repeat BMP in AM    5. Rheumatoid Arthritis - Continue prednisone PO. Outpatient Rheum follow up. PT evaluation.    6. Hypertension: Bp stable; continue metoprolol and lasix.     7. Sinus tachycardia- patient on Bisoprolol at home. Give Lopressor 2.5 mg IV x 1, increase Metoprolol to 25 mg BID. HR also elevated due to anemia, transfuse one unit today.     DVT ppx - CCDs due to anemia
The patient is a 64 year old female with a history of rheumatoid arthritis on DMARD agents and prednisone, diabetes mellitus type 2, hypertension and chronic iron deficiency anemia who was referred to the ER by her PMD for complaints of difficulty breathing. According to the patient, she was admitted to [facility] from 12/17-12/20 for anemia. She was transfused 2 units of PRBC and discharged. SInce being discharged from the hospital she has had complaints of progressively worsening dyspnea on exertion associated with lower extremity edema. She was evaluated by her PMD today who referred her to the ER for evaluation. In the ER, noted to have SPo2 of 91% on room air improved with NC. Labs positive for a BNP of 1079 and troponin of 0.10. Patient denies complaints of chest pain, had stress test 3 months ago which she states was normal. Chest xray consistent with cardiomegaly and pulmonary edema. Admitted for acute CHF. CT with evidence of PNA; started on abx 1/29 , doing better     1- PNA - likely gr positive and gr neg organism and most  likely present on admission   cont levaquin complete course   discharge home today outpatient follow up with pulmonologist in 1-2 weeks     2-.Acute Hypoxic Respiratory Failure likely due to Acute Diastolic Heart Failure and Multifocal PNA (suspected gram negative organism)  cont lasix , improved   home oxygen given   taper steroid     3. Elevated troponin on admission  likely due to demand ischemia  history of CAD with  intolerant to ASA  started on statin  negative outpatient nuclear stress test    4- Dm type 2   controlled   cont metformin     d/w daughter
The patient is a 64 year old female with a history of rheumatoid arthritis on DMARD agents and prednisone, diabetes mellitus type 2, hypertension and chronic iron deficiency anemia who was referred to the ER by her PMD for complaints of difficulty breathing. According to the patient, she was admitted to [facility] from 12/17-12/20 for anemia. She was transfused 2 units of PRBC and discharged. SInce being discharged from the hospital she has had complaints of progressively worsening dyspnea on exertion associated with lower extremity edema. She was evaluated by her PMD today who referred her to the ER for evaluation. In the ER, noted to have SPo2 of 91% on room air improved with NC. Labs positive for a BNP of 1079 and troponin of 0.10. Patient denies complaints of chest pain, had stress test 3 months ago which she states was normal. Chest xray consistent with cardiomegaly and pulmonary edema. Admitted for acute CHF. CT with evidence of PNA; started on abx 1/29 , doing better     1- PNA - likely gr positive and gr neg organism   cont levaquin discharge home in 24-48 hours likely     2-.Acute Hypoxic Respiratory Failure likely due to Acute Diastolic Heart Failure and Multifocal PNA (suspected gram negative organism)  on iv lasix and iv steroid now   taper steroid     3. Elevated troponin on admission  likely due to demand ischemia  history of CAD with  intolerant to ASA  started on statin  negative outpatient nuclear stress test
The patient is a 64 year old female with a history of rheumatoid arthritis on DMARD agents and prednisone, diabetes mellitus type 2, hypertension and chronic iron deficiency anemia who was referred to the ER by her PMD for complaints of difficulty breathing. According to the patient, she was admitted to [facility] from 12/17-12/20 for anemia. She was transfused 2 units of PRBC and discharged. SInce being discharged from the hospital she has had complaints of progressively worsening dyspnea on exertion associated with lower extremity edema. She was evaluated by her PMD today who referred her to the ER for evaluation. In the ER, noted to have SPo2 of 91% on room air improved with NC. Labs positive for a BNP of 1079 and troponin of 0.10. Patient denies complaints of chest pain, had stress test 3 months ago which she states was normal. Chest xray consistent with cardiomegaly and pulmonary edema. Admitted for acute CHF. CT with evidence of PNA; started on abx 1/29 , doing better     1- PNA - likely gr positive and gr neg organism   cont levaquin discharge home today   outpatient follow up with pulmonologist in 1-2 weeks     2-.Acute Hypoxic Respiratory Failure likely due to Acute Diastolic Heart Failure and Multifocal PNA (suspected gram negative organism)  cont lasix , improved   home oxygen given   taper steroid     3. Elevated troponin on admission  likely due to demand ischemia  history of CAD with  intolerant to ASA  started on statin  negative outpatient nuclear stress test  4- Dm type 2   controlled   cont metformin
The patient is a 64 year old female with a history of rheumatoid arthritis on DMARD agents and prednisone, diabetes mellitus type 2, hypertension and chronic iron deficiency anemia who was referred to the ER by her PMD for complaints of difficulty breathing. According to the patient, she was admitted to [facility] from 12/17-12/20 for anemia. She was transfused 2 units of PRBC and discharged. SInce being discharged from the hospital she has had complaints of progressively worsening dyspnea on exertion associated with lower extremity edema. She was evaluated by her PMD today who referred her to the ER for evaluation. In the ER, noted to have SPo2 of 91% on room air improved with NC. Labs positive for a BNP of 1079 and troponin of 0.10. Patient denies complaints of chest pain, had stress test 3 months ago which she states was normal. Chest xray consistent with cardiomegaly and pulmonary edema. Admitted for acute CHF. CT with evidence of PNA; started on abx today.    1. Acute Hypoxic Respiratory Failure likely due to Acute Diastolic Heart Failure and Multifocal PNA (suspected gram negative organism)  -Volume status acceptable; change to PO lasix  -CTA reviewed; evidence of pneumonia and scarring/fibrosis (possibly from RA)  -Worsening leukocytosis and low grade fever noted  -Check blood cultures x 2  -Check RVP  -Check urine legionella  -Vanco x 1 now  -Aztreonam given PCN allergy  -TTE reviewed; preserved EF  -Continue Supplemental O2 and bronchodilators as needed  -Outpatient pulmonary referral    2. Elevated troponin likely due to demand ischemia  history of CAD; intolerant to ASA  started on statin; lipid panel reviewed  negative outpatient nuclear stress test  Denies complaints of chest pain at this time  Repeat TNI 0.12 --> 0.11  EKG with no acute ST- T changes  f/u further Cardio recommendations     3. Anemia on chronic disease  -Hb 7.9; check occult blood  -check iron studies  -no overt signs/symptoms of bleeding  -repeat H/H in AM    4. Hyponatremia - initially believed to be due to hypervolemia  -However, sodium is trending down with diuresis  -Change lasix to PO  -check Urine studies  -check TSH, uric acid, osm  -free water restriction  -strict I/Os  -repeat BMP in AM    5. Rheumatoid Arthritis - Continue prednisone PO. Outpatient Rheum follow up. PT evaluation.    6. Hypertension: Bp stable; continue metoprolol and lasix.     DVT ppx - CCDs due to anemia
The patient is a 64 year old female with a history of rheumatoid arthritis on DMARD agents and prednisone, diabetes mellitus type 2, hypertension and chronic iron deficiency anemia who was referred to the ER by her PMD for complaints of difficulty breathing. According to the patient, she was admitted to [facility] from 12/17-12/20 for anemia. She was transfused 2 units of PRBC and discharged. SInce being discharged from the hospital she has had complaints of progressively worsening dyspnea on exertion associated with lower extremity edema. She was evaluated by her PMD today who referred her to the ER for evaluation. In the ER, noted to have SPo2 of 91% on room air improved with NC. Labs positive for a BNP of 1079 and troponin of 0.10. Patient denies complaints of chest pain, had stress test 3 months ago which she states was normal. Chest xray consistent with cardiomegaly and pulmonary edema. Admitted for acute CHF. CT with evidence of PNA; started on abx     1. Acute Hypoxic Respiratory Failure likely due to Acute Diastolic Heart Failure and Multifocal PNA (suspected gram negative organism)  -appears euvolemic; hold lasix due to hyponatremia  -CTA reviewed; evidence of pneumonia and scarring/fibrosis (possibly from RA)  -Worsening leukocytosis noted; procal mildly elevated. ID consult appreciated; switch to Levaquin  -blood cultures no growth to date  -Negative  RVP  -f/u urine legionella  -TTE reviewed; preserved EF  -Outpatient pulmonary referral upon discharge  -Wean O2 as tolerated; assess for home O2    2. Elevated troponin likely due to demand ischemia  history of CAD; intolerant to ASA  started on statin; lipid panel reviewed  negative outpatient nuclear stress test  Denies complaints of chest pain at this time  Repeat TNI 0.12 --> 0.11  EKG with Sinus Tachy and no acute ST- T changes  Cardio follow up appreciated    3. Anemia on chronic disease  -Hb stable s/p 1 unit of PRBC  -occult blood negative  -iron studies reviewed; start ferrous sulfate  -monitor H/H    4. Hyponatremia    -sodium 132 --> 129  -orthostatics negative; hold lasix for now  -free water restriction discussed with patient and family in detail  -urine studies reordered; still pending  -normal TSH  -monitor strict I/Os  -repeat BMP in AM    5. Rheumatoid Arthritis - Continue prednisone PO. Outpatient Rheum follow up. PT evaluation noted.    6. Hypertension: Bp stable; continue bisoprolol.    7. Sinus tachycardia- improved; metoprolol changed to bisoprolol. Continue telemonitoring. Cardio follow up appreciated.    DVT ppx - SCDs due to anemia      Attending Attestation:   Plan discussed with patient, family at bedside, RN, NP.
The patient is a 64 year old female with a history of rheumatoid arthritis on DMARD agents and prednisone, diabetes mellitus type 2, hypertension and chronic iron deficiency anemia who was referred to the ER by her PMD for complaints of difficulty breathing. According to the patient, she was admitted to [facility] from 12/17-12/20 for anemia. She was transfused 2 units of PRBC and discharged. SInce being discharged from the hospital she has had complaints of progressively worsening dyspnea on exertion associated with lower extremity edema. She was evaluated by her PMD today who referred her to the ER for evaluation. In the ER, noted to have SPo2 of 91% on room air improved with NC. Labs positive for a BNP of 1079 and troponin of 0.10. Patient denies complaints of chest pain, had stress test 3 months ago which she states was normal. Chest xray consistent with cardiomegaly and pulmonary edema. Admitted for acute CHF. CT with evidence of PNA; started on abx     1. Acute Hypoxic Respiratory Failure likely due to Acute Diastolic Heart Failure and Multifocal PNA (suspected gram negative organism)  -appears euvolemic; continue PO lasix  -CTA reviewed; evidence of pneumonia and scarring/fibrosis (possibly from RA)  -Worsening leukocytosis noted; ID consulted. check procal and add azithromycin. Continue aztreonam.  -blood cultures no growth to date  -Negative  RVP  -Check urine legionella  -TTE reviewed; preserved EF  -Outpatient pulmonary referral upon discharge  -Wean O2 as tolerated    2. Elevated troponin likely due to demand ischemia  history of CAD; intolerant to ASA  started on statin; lipid panel reviewed  negative outpatient nuclear stress test  Denies complaints of chest pain at this time  Repeat TNI 0.12 --> 0.11  EKG with no acute ST- T changes  f/u further Cardio recommendations (follow up requested)    3. Anemia on chronic disease  -Hb 9.1 s/p 1 unit of PRBC  -iron studies reviewed; start ferrous sulfate  -occult blood negative  -monitor H/H    4. Hyponatremia    -sodium 132 today  -urine studies reordered; still pending  -Continue lasix for now  -normal TSH  -monitor strict I/Os  -repeat BMP in AM    5. Rheumatoid Arthritis - Continue prednisone PO. Outpatient Rheum follow up. PT evaluation noted.    6. Hypertension: Bp stable; continue bisoprolol and lasix.     7. Sinus tachycardia- repeat EKG. metoprolol changed to bisoprolol. Continue telemonitoring. Cardio follow up requested.    DVT ppx - SCDs due to anemia

## 2020-02-04 NOTE — DISCHARGE NOTE NURSING/CASE MANAGEMENT/SOCIAL WORK - PATIENT PORTAL LINK FT
You can access the FollowMyHealth Patient Portal offered by [facility] by registering at the following website: [email or web address]. By joining [employer]’s FollowMyHealth portal, you will also be able to view your health information using other applications (apps) compatible with our system.

## 2020-02-04 NOTE — PROGRESS NOTE ADULT - REASON FOR ADMISSION
Difficulty breathing

## 2020-02-04 NOTE — DISCHARGE NOTE NURSING/CASE MANAGEMENT/SOCIAL WORK - NSDCPEEMAIL_GEN_ALL_CORE
[facility] email [email or web address]

## 2020-02-04 NOTE — DISCHARGE NOTE NURSING/CASE MANAGEMENT/SOCIAL WORK - NSDCPEWEB_GEN_ALL_CORE
[place] website --- [email or web address] website --- [email or web address]

## 2020-02-04 NOTE — PROGRESS NOTE ADULT - SUBJECTIVE AND OBJECTIVE BOX
Internal Medicine Hospitalist Progress Note  follow up for PNA , CHF , respiratory failure     she is seen examined , feels well , no new complaints   resting in the bed     Vital Signs Last 24 Hrs  T(C): 36.8 (03 Feb 2020 23:47), Max: 36.8 (03 Feb 2020 23:47)  T(F): 98.2 (03 Feb 2020 23:47), Max: 98.2 (03 Feb 2020 23:47)  HR: 86 (04 Feb 2020 04:57) (86 - 107)  BP: 131/75 (04 Feb 2020 04:57) (114/61 - 131/76)  BP(mean): --  RR: 18 (03 Feb 2020 23:47) (18 - 18)  SpO2: 96% (03 Feb 2020 23:47) (96% - 96%)    PHYSICAL EXAM:      Constitutional: awake alert , no distress     Respiratory: bilateral basilar rales , no wheezing     Cardiovascular: regular s1 /s2     Gastrointestinal: soft no tenderness , Bs positive     Extremities: no pretibial edema                                 9.1    18.38 )-----------( 692      ( 02 Feb 2020 08:11 )             30.8   02-02    130<L>  |  90<L>  |  44.0<H>  ----------------------------<  214<H>  5.0   |  28.0  |  0.75    Ca    9.2      02 Feb 2020 08:11        Radiology :

## 2020-02-06 PROBLEM — I10 ESSENTIAL (PRIMARY) HYPERTENSION: Chronic | Status: ACTIVE | Noted: 2020-01-24

## 2020-02-06 PROBLEM — M06.9 RHEUMATOID ARTHRITIS, UNSPECIFIED: Chronic | Status: ACTIVE | Noted: 2020-01-24

## 2020-02-06 PROBLEM — D64.9 ANEMIA, UNSPECIFIED: Chronic | Status: ACTIVE | Noted: 2020-01-24

## 2020-02-14 ENCOUNTER — APPOINTMENT (OUTPATIENT)
Dept: INTERNAL MEDICINE | Facility: CLINIC | Age: 65
End: 2020-02-14
Payer: MEDICARE

## 2020-02-14 VITALS
SYSTOLIC BLOOD PRESSURE: 100 MMHG | HEART RATE: 110 BPM | DIASTOLIC BLOOD PRESSURE: 68 MMHG | RESPIRATION RATE: 18 BRPM | OXYGEN SATURATION: 97 %

## 2020-02-14 VITALS — WEIGHT: 150 LBS | HEIGHT: 55.5 IN | BODY MASS INDEX: 34.22 KG/M2

## 2020-02-14 PROCEDURE — 99496 TRANSJ CARE MGMT HIGH F2F 7D: CPT

## 2020-02-14 RX ORDER — MOMETASONE FUROATE 110 UG/1
110 INHALANT RESPIRATORY (INHALATION) TWICE DAILY
Qty: 1 | Refills: 0 | Status: ACTIVE | COMMUNITY
Start: 2020-02-14

## 2020-02-14 NOTE — HEALTH RISK ASSESSMENT
[] : No [No] : No [0] : 2) Feeling down, depressed, or hopeless: Not at all (0) [Change in mental status noted] : No change in mental status noted [Language] : denies difficulty with language [Behavior] : denies difficulty with behavior [Learning/Retaining New Information] : denies difficulty learning/retaining new information [Handling Complex Tasks] : denies difficulty handling complex tasks [Reasoning] : denies difficulty with reasoning [Spatial Ability and Orientation] : denies difficulty with spatial ability and orientation [None] : None [High School] : high school [] :  [Sexually Active] : not sexually active [High Risk Behavior] : no high risk behavior [de-identified] : needs assistance [de-identified] : needs assistance

## 2020-02-14 NOTE — ASSESSMENT
[FreeTextEntry1] : acute respiratory failure from pna./pulm fibirosis\par pulm follow up\par 02 dependant for now\par RA see rheum for follow up likely partial cause of  her resp failure in addition to pna\par dm stable\par add asmanex did want albuterol gave her too much tachycardia\par

## 2020-02-14 NOTE — HISTORY OF PRESENT ILLNESS
[Post-hospitalization from ___ Hospital] : Post-hospitalization from [unfilled] Hospital [Admitted on: ___] : The patient was admitted on [unfilled] [Discharged on ___] : discharged on [unfilled] [Discharge Summary] : discharge summary [FreeTextEntry2] : Patient was at [facility] with PNA then discharged readmtted to [facility] with tachycardia from inhalers\par she is now on home o2 and feels much better\par she is on tapering doses of steroids but no inhalers\par has RA with pulm fibrosis on  cxr

## 2020-02-28 ENCOUNTER — APPOINTMENT (OUTPATIENT)
Dept: INTERNAL MEDICINE | Facility: CLINIC | Age: 65
End: 2020-02-28

## 2020-03-04 ENCOUNTER — APPOINTMENT (OUTPATIENT)
Dept: INTERNAL MEDICINE | Facility: CLINIC | Age: 65
End: 2020-03-04
Payer: MEDICARE

## 2020-03-04 VITALS — WEIGHT: 168 LBS | TEMPERATURE: 98.4 F | HEIGHT: 55.5 IN | BODY MASS INDEX: 38.33 KG/M2

## 2020-03-04 VITALS
SYSTOLIC BLOOD PRESSURE: 110 MMHG | WEIGHT: 168 LBS | HEART RATE: 118 BPM | HEIGHT: 55.5 IN | RESPIRATION RATE: 18 BRPM | DIASTOLIC BLOOD PRESSURE: 72 MMHG | OXYGEN SATURATION: 95 % | BODY MASS INDEX: 38.33 KG/M2

## 2020-03-04 PROCEDURE — 99496 TRANSJ CARE MGMT HIGH F2F 7D: CPT

## 2020-03-04 NOTE — PHYSICAL EXAM
[No Acute Distress] : no acute distress [Well Nourished] : well nourished [Well Developed] : well developed [Well-Appearing] : well-appearing [Normal Sclera/Conjunctiva] : normal sclera/conjunctiva [PERRL] : pupils equal round and reactive to light [EOMI] : extraocular movements intact [Normal Outer Ear/Nose] : the outer ears and nose were normal in appearance [Normal Oropharynx] : the oropharynx was normal [No JVD] : no jugular venous distention [No Lymphadenopathy] : no lymphadenopathy [Supple] : supple [Thyroid Normal, No Nodules] : the thyroid was normal and there were no nodules present [Normal Rate] : normal rate  [Regular Rhythm] : with a regular rhythm [Normal S1, S2] : normal S1 and S2 [No Murmur] : no murmur heard [No Carotid Bruits] : no carotid bruits [No Abdominal Bruit] : a ~M bruit was not heard ~T in the abdomen [No Varicosities] : no varicosities [Pedal Pulses Present] : the pedal pulses are present [No Edema] : there was no peripheral edema [No Palpable Aorta] : no palpable aorta [No Extremity Clubbing/Cyanosis] : no extremity clubbing/cyanosis [Soft] : abdomen soft [Non Tender] : non-tender [Non-distended] : non-distended [No Masses] : no abdominal mass palpated [No HSM] : no HSM [Normal Posterior Cervical Nodes] : no posterior cervical lymphadenopathy [Normal Bowel Sounds] : normal bowel sounds [Normal Anterior Cervical Nodes] : no anterior cervical lymphadenopathy [No CVA Tenderness] : no CVA  tenderness [No Spinal Tenderness] : no spinal tenderness [No Joint Swelling] : no joint swelling [Grossly Normal Strength/Tone] : grossly normal strength/tone [No Rash] : no rash [Coordination Grossly Intact] : coordination grossly intact [No Focal Deficits] : no focal deficits [Normal Gait] : normal gait [Deep Tendon Reflexes (DTR)] : deep tendon reflexes were 2+ and symmetric [Normal Affect] : the affect was normal [Normal Insight/Judgement] : insight and judgment were intact [de-identified] : bilateral rales, sob

## 2020-03-04 NOTE — HISTORY OF PRESENT ILLNESS
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

## 2020-03-04 NOTE — COUNSELING
[Structured Weight Management Program suggested:] : Structured weight management program suggested [Encouraged to maintain food diary] : Encouraged to maintain food diary [Encouraged to increase physical activity] : Encouraged to increase physical activity [Encouraged to use exercise tracking device] : Encouraged to use exercise tracking device

## 2020-03-04 NOTE — HEALTH RISK ASSESSMENT
[] : No [No] : No [0] : 2) Feeling down, depressed, or hopeless: Not at all (0) [HIV test declined] : HIV test declined [Hepatitis C test declined] : Hepatitis C test declined [Change in mental status noted] : No change in mental status noted [Language] : denies difficulty with language [Behavior] : denies difficulty with behavior [Learning/Retaining New Information] : denies difficulty learning/retaining new information [Handling Complex Tasks] : denies difficulty handling complex tasks [Reasoning] : denies difficulty with reasoning [None] : None [With Significant Other] : lives with significant other [Sexually Active] : not sexually active [High Risk Behavior] : no high risk behavior [Feels Safe at Home] : Feels safe at home [Reports changes in hearing] : Reports no changes in hearing [Reports changes in vision] : Reports no changes in vision [Reports normal functional visual acuity (ie: able to read med bottle)] : Reports poor functional visual acuity.  [Reports changes in dental health] : Reports no changes in dental health [Smoke Detector] : smoke detector [Guns at Home] : no guns at home [Carbon Monoxide Detector] : carbon monoxide detector [Safety elements used in home] : safety elements used in home [Seat Belt] : does not use seat belt [de-identified] : needs assistance daughter helps [de-identified] : need assistance [AdvancecareDate] : 3/4/20

## 2020-03-04 NOTE — ASSESSMENT
[FreeTextEntry1] : 02 dependent lung disease\par salt restriction\par rheum follow up \par pulm follow\par continue 02 continuously\par follow up 2 to 3 wees\par continue steroids at 3mg

## 2020-03-04 NOTE — REVIEW OF SYSTEMS
[Shortness Of Breath] : shortness of breath [Dyspnea on Exertion] : dyspnea on exertion [Cough] : no cough [Negative] : Heme/Lymph [FreeTextEntry2] : sob o2 depenedent

## 2020-03-24 ENCOUNTER — APPOINTMENT (OUTPATIENT)
Dept: INTERNAL MEDICINE | Facility: CLINIC | Age: 65
End: 2020-03-24

## 2020-05-11 ENCOUNTER — APPOINTMENT (OUTPATIENT)
Dept: INTERNAL MEDICINE | Facility: CLINIC | Age: 65
End: 2020-05-11
Payer: MEDICARE

## 2020-05-11 PROCEDURE — 99213 OFFICE O/P EST LOW 20 MIN: CPT | Mod: 95

## 2020-05-11 RX ORDER — PREDNISONE 5 MG/1
5 TABLET ORAL DAILY
Qty: 30 | Refills: 0 | Status: ACTIVE | COMMUNITY
Start: 2020-01-07 | End: 1900-01-01

## 2020-05-11 NOTE — PHYSICAL EXAM
[Well Nourished] : well nourished [No Acute Distress] : no acute distress [No Respiratory Distress] : no respiratory distress  [No Accessory Muscle Use] : no accessory muscle use [Normal] : normal gait, coordination grossly intact, no focal deficits and deep tendon reflexes were 2+ and symmetric

## 2020-05-11 NOTE — ASSESSMENT
[FreeTextEntry1] : ra restart her prednisone\par ipf continue o2\par dm stable continue meds\par formal lab test in July in the office if possible\par 15 minutes of face time

## 2020-05-11 NOTE — HISTORY OF PRESENT ILLNESS
[Medical Office: ([facility])___] : at the medical office located in  [Home] : at home, [unfilled] , at the time of the visit. [Patient] : the patient [Self] : self [de-identified] : follow up pulmonary fibirosis, ra\par is on oxygen\par feels great on oxygen, has her normal color back\par needs her prednisone for her RA\par started on meds by Rheum\emigdio saw cardio and pulmonary, has monitor on right now for her heart [FreeTextEntry1] : for follow ipf

## 2020-05-18 DIAGNOSIS — Z86.69 PERSONAL HISTORY OF OTHER DISEASES OF THE NERVOUS SYSTEM AND SENSE ORGANS: ICD-10-CM

## 2020-09-09 ENCOUNTER — RX RENEWAL (OUTPATIENT)
Age: 65
End: 2020-09-09

## 2020-09-10 ENCOUNTER — APPOINTMENT (OUTPATIENT)
Dept: INTERNAL MEDICINE | Facility: CLINIC | Age: 65
End: 2020-09-10
Payer: MEDICARE

## 2020-09-10 ENCOUNTER — TRANSCRIPTION ENCOUNTER (OUTPATIENT)
Age: 65
End: 2020-09-10

## 2020-09-10 VITALS — HEIGHT: 55.5 IN | WEIGHT: 138 LBS | BODY MASS INDEX: 31.49 KG/M2

## 2020-09-10 DIAGNOSIS — L85.3 XEROSIS CUTIS: ICD-10-CM

## 2020-09-10 LAB — HBA1C MFR BLD HPLC: 6.5

## 2020-09-10 PROCEDURE — 83036 HEMOGLOBIN GLYCOSYLATED A1C: CPT | Mod: QW

## 2020-09-10 PROCEDURE — 99214 OFFICE O/P EST MOD 30 MIN: CPT | Mod: 25

## 2020-09-10 PROCEDURE — G0008: CPT

## 2020-09-10 PROCEDURE — 90686 IIV4 VACC NO PRSV 0.5 ML IM: CPT

## 2020-09-10 RX ORDER — ADALIMUMAB 20MG/0.4ML
20 KIT SUBCUTANEOUS
Refills: 0 | Status: DISCONTINUED | COMMUNITY
Start: 2020-01-07 | End: 2020-09-10

## 2020-09-10 RX ORDER — AZITHROMYCIN 250 MG/1
250 TABLET, FILM COATED ORAL
Qty: 6 | Refills: 0 | Status: DISCONTINUED | COMMUNITY
Start: 2020-05-18 | End: 2020-09-10

## 2020-09-10 NOTE — PHYSICAL EXAM
[No Acute Distress] : no acute distress [Well Nourished] : well nourished [Normal Sclera/Conjunctiva] : normal sclera/conjunctiva [Well-Appearing] : well-appearing [Well Developed] : well developed [EOMI] : extraocular movements intact [Normal Outer Ear/Nose] : the outer ears and nose were normal in appearance [PERRL] : pupils equal round and reactive to light [Normal Oropharynx] : the oropharynx was normal [No JVD] : no jugular venous distention [No Lymphadenopathy] : no lymphadenopathy [No Respiratory Distress] : no respiratory distress  [Thyroid Normal, No Nodules] : the thyroid was normal and there were no nodules present [Supple] : supple [Normal Rate] : normal rate  [No Murmur] : no murmur heard [Normal S1, S2] : normal S1 and S2 [Regular Rhythm] : with a regular rhythm [No Varicosities] : no varicosities [No Carotid Bruits] : no carotid bruits [No Abdominal Bruit] : a ~M bruit was not heard ~T in the abdomen [Pedal Pulses Present] : the pedal pulses are present [No Edema] : there was no peripheral edema [No Palpable Aorta] : no palpable aorta [No Extremity Clubbing/Cyanosis] : no extremity clubbing/cyanosis [Non Tender] : non-tender [Soft] : abdomen soft [Non-distended] : non-distended [Normal Bowel Sounds] : normal bowel sounds [No HSM] : no HSM [No Masses] : no abdominal mass palpated [Normal Anterior Cervical Nodes] : no anterior cervical lymphadenopathy [Normal Posterior Cervical Nodes] : no posterior cervical lymphadenopathy [No Joint Swelling] : no joint swelling [No CVA Tenderness] : no CVA  tenderness [No Spinal Tenderness] : no spinal tenderness [Coordination Grossly Intact] : coordination grossly intact [Grossly Normal Strength/Tone] : grossly normal strength/tone [No Rash] : no rash [Normal Affect] : the affect was normal [No Focal Deficits] : no focal deficits [Normal Gait] : normal gait [Normal Insight/Judgement] : insight and judgment were intact [de-identified] : cracles sound

## 2020-09-10 NOTE — HISTORY OF PRESENT ILLNESS
[FreeTextEntry1] : for follow up pulm fibrosis [de-identified] : has pulm  fibrosis o2 dependant\par has lost 30 pounds with diet change\par she has dm a1c is great lower metformin to 500 podaily  cat not need\par is on rtiuxin for the condition\par has RA is seeing rheum and pulm \par agees to flu vaccine

## 2020-12-06 NOTE — CONSULT NOTE ADULT - ASSESSMENT
Multilobar Pna with persistent hypoxia secondary to shunt  Immunocompromised secondary to rx for RA    Plan:  f/u c-xray  ABG  continue ABX Patient/Caregiver provided printed discharge information.

## 2020-12-10 RX ORDER — VALACYCLOVIR 1 G/1
1 TABLET, FILM COATED ORAL 3 TIMES DAILY
Qty: 21 | Refills: 0 | Status: ACTIVE | COMMUNITY
Start: 2020-12-10 | End: 1900-01-01

## 2020-12-14 ENCOUNTER — LABORATORY RESULT (OUTPATIENT)
Age: 65
End: 2020-12-14

## 2020-12-14 ENCOUNTER — APPOINTMENT (OUTPATIENT)
Dept: INTERNAL MEDICINE | Facility: CLINIC | Age: 65
End: 2020-12-14
Payer: MEDICARE

## 2020-12-14 VITALS
WEIGHT: 138 LBS | HEART RATE: 78 BPM | DIASTOLIC BLOOD PRESSURE: 76 MMHG | RESPIRATION RATE: 12 BRPM | HEIGHT: 55.5 IN | SYSTOLIC BLOOD PRESSURE: 118 MMHG | BODY MASS INDEX: 31.49 KG/M2

## 2020-12-14 DIAGNOSIS — B02.9 ZOSTER W/OUT COMPLICATIONS: ICD-10-CM

## 2020-12-14 PROCEDURE — 99072 ADDL SUPL MATRL&STAF TM PHE: CPT

## 2020-12-14 PROCEDURE — 36415 COLL VENOUS BLD VENIPUNCTURE: CPT

## 2020-12-14 PROCEDURE — 99214 OFFICE O/P EST MOD 30 MIN: CPT | Mod: 25

## 2020-12-14 RX ORDER — NYSTATIN 100000 [USP'U]/G
100000 CREAM TOPICAL 3 TIMES DAILY
Qty: 1 | Refills: 3 | Status: ACTIVE | COMMUNITY
Start: 2020-12-14 | End: 1900-01-01

## 2020-12-14 NOTE — HISTORY OF PRESENT ILLNESS
[FreeTextEntry1] : for follow up  [de-identified] : for follow up \par had shingles last week but now in pain from shingles\par she has pulm fibrosis and is home 02 dependent\par has no current fever had fever last week\par has RA and is seeing pulm for followup soon

## 2020-12-14 NOTE — PHYSICAL EXAM
[No Acute Distress] : no acute distress [Well Nourished] : well nourished [Well Developed] : well developed [Well-Appearing] : well-appearing [Normal Sclera/Conjunctiva] : normal sclera/conjunctiva [PERRL] : pupils equal round and reactive to light [EOMI] : extraocular movements intact [Normal Outer Ear/Nose] : the outer ears and nose were normal in appearance [Normal Oropharynx] : the oropharynx was normal [No JVD] : no jugular venous distention [No Lymphadenopathy] : no lymphadenopathy [Supple] : supple [Thyroid Normal, No Nodules] : the thyroid was normal and there were no nodules present [No Respiratory Distress] : no respiratory distress  [No Accessory Muscle Use] : no accessory muscle use [Clear to Auscultation] : lungs were clear to auscultation bilaterally [Normal Rate] : normal rate  [Regular Rhythm] : with a regular rhythm [Normal S1, S2] : normal S1 and S2 [No Murmur] : no murmur heard [No Carotid Bruits] : no carotid bruits [No Abdominal Bruit] : a ~M bruit was not heard ~T in the abdomen [No Varicosities] : no varicosities [Pedal Pulses Present] : the pedal pulses are present [No Edema] : there was no peripheral edema [No Palpable Aorta] : no palpable aorta [No Extremity Clubbing/Cyanosis] : no extremity clubbing/cyanosis [Soft] : abdomen soft [Non Tender] : non-tender [Non-distended] : non-distended [No Masses] : no abdominal mass palpated [No HSM] : no HSM [Normal Bowel Sounds] : normal bowel sounds [Normal Posterior Cervical Nodes] : no posterior cervical lymphadenopathy [Normal Anterior Cervical Nodes] : no anterior cervical lymphadenopathy [No CVA Tenderness] : no CVA  tenderness [No Spinal Tenderness] : no spinal tenderness [No Joint Swelling] : no joint swelling [Grossly Normal Strength/Tone] : grossly normal strength/tone [Coordination Grossly Intact] : coordination grossly intact [No Focal Deficits] : no focal deficits [Normal Gait] : normal gait [Normal Affect] : the affect was normal [Normal Insight/Judgement] : insight and judgment were intact [de-identified] : dried skin lesions

## 2020-12-14 NOTE — ASSESSMENT
[FreeTextEntry1] : shingles with post herpeticneuralgia\par add neurontin tid\par pulmonary fibrosis stable on home 02\par has pulse oximetry\par check labs\par check RA ccp\par check a1c\par should get shingrix vaccine in futuer\par definite candidate for covid vaccine as is extremely high risk

## 2020-12-15 LAB
ALBUMIN SERPL ELPH-MCNC: 3.2 G/DL
ALP BLD-CCNC: 110 U/L
ALT SERPL-CCNC: 10 U/L
ANION GAP SERPL CALC-SCNC: 10 MMOL/L
AST SERPL-CCNC: 21 U/L
BASOPHILS # BLD AUTO: 0.06 K/UL
BASOPHILS NFR BLD AUTO: 0.9 %
BILIRUB SERPL-MCNC: 0.2 MG/DL
BUN SERPL-MCNC: 13 MG/DL
CALCIUM SERPL-MCNC: 9 MG/DL
CCP AB SER IA-ACNC: >250 UNITS
CHLORIDE SERPL-SCNC: 90 MMOL/L
CHOLEST SERPL-MCNC: 155 MG/DL
CO2 SERPL-SCNC: 32 MMOL/L
CREAT SERPL-MCNC: 0.76 MG/DL
EOSINOPHIL # BLD AUTO: 0.12 K/UL
EOSINOPHIL NFR BLD AUTO: 1.7 %
ERYTHROCYTE [SEDIMENTATION RATE] IN BLOOD BY WESTERGREN METHOD: 76 MM/HR
ESTIMATED AVERAGE GLUCOSE: 143 MG/DL
FERRITIN SERPL-MCNC: 681 NG/ML
GLUCOSE SERPL-MCNC: 133 MG/DL
HBA1C MFR BLD HPLC: 6.6 %
HCT VFR BLD CALC: 33.8 %
HDLC SERPL-MCNC: 33 MG/DL
HGB BLD-MCNC: 10 G/DL
LDLC SERPL CALC-MCNC: 88 MG/DL
LYMPHOCYTES # BLD AUTO: 0.79 K/UL
LYMPHOCYTES NFR BLD AUTO: 11.3 %
MAN DIFF?: NORMAL
MCHC RBC-ENTMCNC: 23.1 PG
MCHC RBC-ENTMCNC: 29.6 GM/DL
MCV RBC AUTO: 78.2 FL
MONOCYTES # BLD AUTO: 0.18 K/UL
MONOCYTES NFR BLD AUTO: 2.6 %
NEUTROPHILS # BLD AUTO: 5.67 K/UL
NEUTROPHILS NFR BLD AUTO: 80.9 %
NONHDLC SERPL-MCNC: 122 MG/DL
PLATELET # BLD AUTO: 415 K/UL
POTASSIUM SERPL-SCNC: 3.1 MMOL/L
PROT SERPL-MCNC: 6.7 G/DL
RBC # BLD: 4.32 M/UL
RBC # FLD: 15.7 %
RF+CCP IGG SER-IMP: ABNORMAL
SODIUM SERPL-SCNC: 132 MMOL/L
TRIGL SERPL-MCNC: 172 MG/DL
WBC # FLD AUTO: 7.01 K/UL

## 2020-12-15 RX ORDER — POTASSIUM CHLORIDE 750 MG/1
10 CAPSULE, EXTENDED RELEASE ORAL
Qty: 90 | Refills: 3 | Status: ACTIVE | COMMUNITY
Start: 2020-12-15 | End: 1900-01-01

## 2020-12-23 PROBLEM — Z86.69 HISTORY OF OTITIS MEDIA: Status: RESOLVED | Noted: 2020-05-18 | Resolved: 2020-12-23

## 2021-01-30 ENCOUNTER — RX RENEWAL (OUTPATIENT)
Age: 66
End: 2021-01-30

## 2021-03-15 ENCOUNTER — NON-APPOINTMENT (OUTPATIENT)
Age: 66
End: 2021-03-15

## 2021-03-26 ENCOUNTER — RX RENEWAL (OUTPATIENT)
Age: 66
End: 2021-03-26

## 2021-06-03 ENCOUNTER — NON-APPOINTMENT (OUTPATIENT)
Age: 66
End: 2021-06-03

## 2021-06-09 ENCOUNTER — NON-APPOINTMENT (OUTPATIENT)
Age: 66
End: 2021-06-09

## 2021-06-09 ENCOUNTER — APPOINTMENT (OUTPATIENT)
Dept: INTERNAL MEDICINE | Facility: CLINIC | Age: 66
End: 2021-06-09
Payer: MEDICARE

## 2021-06-09 VITALS
BODY MASS INDEX: 35.24 KG/M2 | WEIGHT: 154.4 LBS | DIASTOLIC BLOOD PRESSURE: 78 MMHG | RESPIRATION RATE: 14 BRPM | SYSTOLIC BLOOD PRESSURE: 130 MMHG | HEART RATE: 82 BPM

## 2021-06-09 DIAGNOSIS — J96.01 ACUTE RESPIRATORY FAILURE WITH HYPOXIA: ICD-10-CM

## 2021-06-09 DIAGNOSIS — B02.29 OTHER POSTHERPETIC NERVOUS SYSTEM INVOLVEMENT: ICD-10-CM

## 2021-06-09 PROCEDURE — 36415 COLL VENOUS BLD VENIPUNCTURE: CPT

## 2021-06-09 PROCEDURE — G0438: CPT

## 2021-06-09 PROCEDURE — 93000 ELECTROCARDIOGRAM COMPLETE: CPT

## 2021-06-09 RX ORDER — GABAPENTIN 100 MG/1
100 CAPSULE ORAL
Qty: 90 | Refills: 0 | Status: DISCONTINUED | COMMUNITY
Start: 2021-01-30 | End: 2021-06-09

## 2021-06-09 RX ORDER — HYDROCORTISONE 1 %
12 CREAM (GRAM) TOPICAL TWICE DAILY
Qty: 1 | Refills: 6 | Status: ACTIVE | COMMUNITY
Start: 2020-09-10 | End: 1900-01-01

## 2021-06-09 NOTE — HEALTH RISK ASSESSMENT
[Good] : ~his/her~  mood as  good [] : No [No] : No [No falls in past year] : Patient reported no falls in the past year [0] : 2) Feeling down, depressed, or hopeless: Not at all (0) [HIV test declined] : HIV test declined [Hepatitis C test declined] : Hepatitis C test declined [With Family] : lives with family [Retired] : retired [High School] : high school [] :  [Sexually Active] : not sexually active [High Risk Behavior] : no high risk behavior [Reports changes in hearing] : Reports no changes in hearing [Reports changes in vision] : Reports no changes in vision [Reports normal functional visual acuity (ie: able to read med bottle)] : Reports poor functional visual acuity.  [Reports changes in dental health] : Reports no changes in dental health [Smoke Detector] : smoke detector [Carbon Monoxide Detector] : carbon monoxide detector [Guns at Home] : no guns at home [Safety elements used in home] : safety elements used in home [Seat Belt] :  uses seat belt [Sunscreen] : does not use sunscreen [Travel to Developing Areas] : does not  travel to developing areas [TB Exposure] : is not being exposed to tuberculosis [Caregiver Concerns] : does not have caregiver concerns [MammogramDate] : due [ColonoscopyDate] : due 2026 [de-identified] : needs assistance [de-identified] : needs assistance [AdvancecareDate] : 6/9/21

## 2021-06-09 NOTE — ASSESSMENT
[FreeTextEntry1] : dm stable\par ild stable requiring less 0@\par bp stable\par phn increase giovana to 600 tid get shot\par check labs\par ekg good\par follow up 4 months\par see optho\par get mammogram

## 2021-06-09 NOTE — HISTORY OF PRESENT ILLNESS
[FreeTextEntry1] : For CPE [de-identified] : For cpe\par history of RA, ILD, ANEMIA, DM on home 02\par oxygenating at 99 percent she feels \par good, no chest pain sob nvd \par worst symptoms is her post herpetic neuralgia pain\par right flank\par

## 2021-06-10 ENCOUNTER — NON-APPOINTMENT (OUTPATIENT)
Age: 66
End: 2021-06-10

## 2021-06-10 LAB
25(OH)D3 SERPL-MCNC: 45.7 NG/ML
ALBUMIN SERPL ELPH-MCNC: 3.9 G/DL
ALP BLD-CCNC: 117 U/L
ALT SERPL-CCNC: 8 U/L
ANION GAP SERPL CALC-SCNC: 13 MMOL/L
APPEARANCE: CLEAR
AST SERPL-CCNC: 13 U/L
BASOPHILS # BLD AUTO: 0.07 K/UL
BASOPHILS NFR BLD AUTO: 0.7 %
BILIRUB SERPL-MCNC: <0.2 MG/DL
BILIRUBIN URINE: NEGATIVE
BLOOD URINE: NEGATIVE
BUN SERPL-MCNC: 18 MG/DL
CALCIUM SERPL-MCNC: 9.7 MG/DL
CCP AB SER IA-ACNC: >250 UNITS
CHLORIDE SERPL-SCNC: 95 MMOL/L
CHOLEST SERPL-MCNC: 188 MG/DL
CO2 SERPL-SCNC: 28 MMOL/L
COLOR: NORMAL
CREAT SERPL-MCNC: 0.69 MG/DL
CREAT SPEC-SCNC: 57 MG/DL
EOSINOPHIL # BLD AUTO: 0.26 K/UL
EOSINOPHIL NFR BLD AUTO: 2.6 %
ERYTHROCYTE [SEDIMENTATION RATE] IN BLOOD BY WESTERGREN METHOD: 92 MM/HR
ESTIMATED AVERAGE GLUCOSE: 137 MG/DL
GLUCOSE QUALITATIVE U: NEGATIVE
GLUCOSE SERPL-MCNC: 131 MG/DL
HBA1C MFR BLD HPLC: 6.4 %
HCT VFR BLD CALC: 36.8 %
HDLC SERPL-MCNC: 58 MG/DL
HGB BLD-MCNC: 10.7 G/DL
IMM GRANULOCYTES NFR BLD AUTO: 0.5 %
KETONES URINE: NEGATIVE
LDLC SERPL CALC-MCNC: 92 MG/DL
LEUKOCYTE ESTERASE URINE: NEGATIVE
LYMPHOCYTES # BLD AUTO: 1.2 K/UL
LYMPHOCYTES NFR BLD AUTO: 11.8 %
MAN DIFF?: NORMAL
MCHC RBC-ENTMCNC: 23.5 PG
MCHC RBC-ENTMCNC: 29.1 GM/DL
MCV RBC AUTO: 80.7 FL
MICROALBUMIN 24H UR DL<=1MG/L-MCNC: <1.2 MG/DL
MICROALBUMIN/CREAT 24H UR-RTO: NORMAL MG/G
MONOCYTES # BLD AUTO: 0.87 K/UL
MONOCYTES NFR BLD AUTO: 8.6 %
NEUTROPHILS # BLD AUTO: 7.71 K/UL
NEUTROPHILS NFR BLD AUTO: 75.8 %
NITRITE URINE: NEGATIVE
NONHDLC SERPL-MCNC: 130 MG/DL
PH URINE: 6
PLATELET # BLD AUTO: 554 K/UL
POTASSIUM SERPL-SCNC: 4.4 MMOL/L
PROT SERPL-MCNC: 7.2 G/DL
PROTEIN URINE: NEGATIVE
RBC # BLD: 4.56 M/UL
RBC # FLD: 16.3 %
RF+CCP IGG SER-IMP: ABNORMAL
SODIUM SERPL-SCNC: 137 MMOL/L
SPECIFIC GRAVITY URINE: 1.02
T4 FREE SERPL-MCNC: 1.3 NG/DL
TRIGL SERPL-MCNC: 188 MG/DL
TSH SERPL-ACNC: 1.82 UIU/ML
UROBILINOGEN URINE: NORMAL
WBC # FLD AUTO: 10.16 K/UL

## 2021-06-14 ENCOUNTER — NON-APPOINTMENT (OUTPATIENT)
Age: 66
End: 2021-06-14

## 2021-06-15 ENCOUNTER — NON-APPOINTMENT (OUTPATIENT)
Age: 66
End: 2021-06-15

## 2021-07-05 ENCOUNTER — RX RENEWAL (OUTPATIENT)
Age: 66
End: 2021-07-05

## 2021-07-28 ENCOUNTER — RX RENEWAL (OUTPATIENT)
Age: 66
End: 2021-07-28

## 2021-10-10 ENCOUNTER — RX RENEWAL (OUTPATIENT)
Age: 66
End: 2021-10-10

## 2021-12-19 NOTE — CDI QUERY NOTE - NSCDIOTHERTXTBX_GEN_ALL_CORE_HH
Documentation was noted with PNA.     Supporting Documentation:  1/25 Angio of chest:  IMPRESSION:   1. Small pericardial effusion.   2. Multifocal irregular consolidations bilaterally compatible with pneumonia.  1/26 Hospitalist note:  Acute Hypoxic Respiratory Failure likely due to Acute Diastolic Heart Failure and Multifocal PNA (suspected gram negative organism)  -Volume status acceptable; change to PO lasix  -CTA reviewed; evidence of pneumonia and scarring/fibrosis (possibly from RA)  -Worsening leukocytosis and low grade fever noted  -Check blood cultures x 2  -Check RVP  -Check urine legionella    Please clarify if PNA was POA  A) PNA POA  B) PNA evolved after admission  C) Other, please specify  D) Not clinically significant patient

## 2021-12-31 ENCOUNTER — RX RENEWAL (OUTPATIENT)
Age: 66
End: 2021-12-31

## 2022-01-06 ENCOUNTER — RX RENEWAL (OUTPATIENT)
Age: 67
End: 2022-01-06

## 2022-01-11 ENCOUNTER — TRANSCRIPTION ENCOUNTER (OUTPATIENT)
Age: 67
End: 2022-01-11

## 2022-01-14 ENCOUNTER — TRANSCRIPTION ENCOUNTER (OUTPATIENT)
Age: 67
End: 2022-01-14

## 2022-01-20 ENCOUNTER — TRANSCRIPTION ENCOUNTER (OUTPATIENT)
Age: 67
End: 2022-01-20

## 2022-01-21 ENCOUNTER — TRANSCRIPTION ENCOUNTER (OUTPATIENT)
Age: 67
End: 2022-01-21

## 2022-01-27 ENCOUNTER — APPOINTMENT (OUTPATIENT)
Dept: INTERNAL MEDICINE | Facility: CLINIC | Age: 67
End: 2022-01-27
Payer: MEDICARE

## 2022-01-27 VITALS
BODY MASS INDEX: 36.5 KG/M2 | DIASTOLIC BLOOD PRESSURE: 82 MMHG | WEIGHT: 160 LBS | OXYGEN SATURATION: 95 % | HEART RATE: 78 BPM | HEIGHT: 55.5 IN | SYSTOLIC BLOOD PRESSURE: 132 MMHG

## 2022-01-27 DIAGNOSIS — E87.6 HYPOKALEMIA: ICD-10-CM

## 2022-01-27 DIAGNOSIS — R14.1 GAS PAIN: ICD-10-CM

## 2022-01-27 PROCEDURE — 36415 COLL VENOUS BLD VENIPUNCTURE: CPT

## 2022-01-27 PROCEDURE — 99214 OFFICE O/P EST MOD 30 MIN: CPT | Mod: 25

## 2022-01-27 NOTE — ASSESSMENT
[FreeTextEntry1] : dm stable\par RA stable\par ILD stable oxygen dependant\par needs mammogram\par check labs\par follow up 4 months\par hyocyamine for occasional gas

## 2022-01-27 NOTE — HISTORY OF PRESENT ILLNESS
[FreeTextEntry1] : follow medical problems [de-identified] : follow up dm, htn hld, RA, ILD oxygen dependant\par has been doing well at home\par has been covid vaccinated, has flu vaccine\par uses oxygen all day long\par has no chest pain sob nvd or palpitations\par has been taking all meds without concern

## 2022-01-27 NOTE — PHYSICAL EXAM
[No Acute Distress] : no acute distress [Well Nourished] : well nourished [Well Developed] : well developed [Well-Appearing] : well-appearing [Normal Sclera/Conjunctiva] : normal sclera/conjunctiva [PERRL] : pupils equal round and reactive to light [EOMI] : extraocular movements intact [Normal Outer Ear/Nose] : the outer ears and nose were normal in appearance [Normal Oropharynx] : the oropharynx was normal [No JVD] : no jugular venous distention [No Lymphadenopathy] : no lymphadenopathy [Supple] : supple [Thyroid Normal, No Nodules] : the thyroid was normal and there were no nodules present [No Respiratory Distress] : no respiratory distress  [No Accessory Muscle Use] : no accessory muscle use [Normal Rate] : normal rate  [Regular Rhythm] : with a regular rhythm [Normal S1, S2] : normal S1 and S2 [No Murmur] : no murmur heard [No Carotid Bruits] : no carotid bruits [No Abdominal Bruit] : a ~M bruit was not heard ~T in the abdomen [No Varicosities] : no varicosities [Pedal Pulses Present] : the pedal pulses are present [No Edema] : there was no peripheral edema [No Palpable Aorta] : no palpable aorta [No Extremity Clubbing/Cyanosis] : no extremity clubbing/cyanosis [Soft] : abdomen soft [Non Tender] : non-tender [Non-distended] : non-distended [No Masses] : no abdominal mass palpated [No HSM] : no HSM [Normal Bowel Sounds] : normal bowel sounds [Normal Posterior Cervical Nodes] : no posterior cervical lymphadenopathy [Normal Anterior Cervical Nodes] : no anterior cervical lymphadenopathy [No CVA Tenderness] : no CVA  tenderness [No Spinal Tenderness] : no spinal tenderness [No Joint Swelling] : no joint swelling [Grossly Normal Strength/Tone] : grossly normal strength/tone [No Rash] : no rash [Coordination Grossly Intact] : coordination grossly intact [No Focal Deficits] : no focal deficits [Normal Gait] : normal gait [Deep Tendon Reflexes (DTR)] : deep tendon reflexes were 2+ and symmetric [Normal Affect] : the affect was normal [Normal Insight/Judgement] : insight and judgment were intact [de-identified] : bilateral crackles velcro sounding

## 2022-01-27 NOTE — HEALTH RISK ASSESSMENT
[Never] : Never [No] : No [No falls in past year] : Patient reported no falls in the past year [0] : 2) Feeling down, depressed, or hopeless: Not at all (0) [PHQ-2 Negative - No further assessment needed] : PHQ-2 Negative - No further assessment needed [Patient/Caregiver not ready to engage] : , patient/caregiver not ready to engage [[id number]] : 0 [AdvancecareDate] : 1/27/22

## 2022-01-28 ENCOUNTER — NON-APPOINTMENT (OUTPATIENT)
Age: 67
End: 2022-01-28

## 2022-01-28 LAB
25(OH)D3 SERPL-MCNC: 44.7 NG/ML
ALBUMIN SERPL ELPH-MCNC: 3.9 G/DL
ALP BLD-CCNC: 90 U/L
ALT SERPL-CCNC: 8 U/L
ANION GAP SERPL CALC-SCNC: 15 MMOL/L
APPEARANCE: CLEAR
AST SERPL-CCNC: 13 U/L
BASOPHILS # BLD AUTO: 0.1 K/UL
BASOPHILS NFR BLD AUTO: 0.8 %
BILIRUB SERPL-MCNC: 0.2 MG/DL
BILIRUBIN URINE: NEGATIVE
BLOOD URINE: NEGATIVE
BUN SERPL-MCNC: 12 MG/DL
CALCIUM SERPL-MCNC: 9.8 MG/DL
CHLORIDE SERPL-SCNC: 94 MMOL/L
CO2 SERPL-SCNC: 27 MMOL/L
COLOR: YELLOW
CREAT SERPL-MCNC: 0.72 MG/DL
CREAT SPEC-SCNC: 247 MG/DL
EOSINOPHIL # BLD AUTO: 0.32 K/UL
EOSINOPHIL NFR BLD AUTO: 2.4 %
ERYTHROCYTE [SEDIMENTATION RATE] IN BLOOD BY WESTERGREN METHOD: 97 MM/HR
ESTIMATED AVERAGE GLUCOSE: 148 MG/DL
GLUCOSE QUALITATIVE U: NEGATIVE
GLUCOSE SERPL-MCNC: 136 MG/DL
HBA1C MFR BLD HPLC: 6.8 %
HCT VFR BLD CALC: 36.4 %
HGB BLD-MCNC: 11 G/DL
IMM GRANULOCYTES NFR BLD AUTO: 0.9 %
KETONES URINE: NORMAL
LEUKOCYTE ESTERASE URINE: NEGATIVE
LYMPHOCYTES # BLD AUTO: 1.72 K/UL
LYMPHOCYTES NFR BLD AUTO: 13.2 %
MAN DIFF?: NORMAL
MCHC RBC-ENTMCNC: 24.3 PG
MCHC RBC-ENTMCNC: 30.2 GM/DL
MCV RBC AUTO: 80.5 FL
MICROALBUMIN 24H UR DL<=1MG/L-MCNC: 1.8 MG/DL
MICROALBUMIN/CREAT 24H UR-RTO: 7 MG/G
MONOCYTES # BLD AUTO: 0.96 K/UL
MONOCYTES NFR BLD AUTO: 7.3 %
NEUTROPHILS # BLD AUTO: 9.85 K/UL
NEUTROPHILS NFR BLD AUTO: 75.4 %
NITRITE URINE: NEGATIVE
PH URINE: 5.5
PLATELET # BLD AUTO: 625 K/UL
POTASSIUM SERPL-SCNC: 4.2 MMOL/L
PROT SERPL-MCNC: 7 G/DL
PROTEIN URINE: NEGATIVE
RBC # BLD: 4.52 M/UL
RBC # FLD: 14.3 %
RHEUMATOID FACT SER QL: 59 IU/ML
SODIUM SERPL-SCNC: 136 MMOL/L
SPECIFIC GRAVITY URINE: 1.02
T4 FREE SERPL-MCNC: 1.7 NG/DL
TSH SERPL-ACNC: 3.24 UIU/ML
UROBILINOGEN URINE: NORMAL
WBC # FLD AUTO: 13.07 K/UL

## 2022-01-29 LAB — HIV1+2 AB SPEC QL IA.RAPID: NONREACTIVE

## 2022-02-01 ENCOUNTER — NON-APPOINTMENT (OUTPATIENT)
Age: 67
End: 2022-02-01

## 2022-02-01 LAB
CCP AB SER IA-ACNC: >250 UNITS
RF+CCP IGG SER-IMP: ABNORMAL

## 2022-02-06 NOTE — PHYSICAL EXAM
Patient is requesting [facility] be called for supportive care. Patient referred to this organization by her psychiatrist.    Patient also requested a female doctor, charge RN made aware.  [phone]   [No Acute Distress] : no acute distress [Well Nourished] : well nourished [Well Developed] : well developed [Well-Appearing] : well-appearing [Normal Sclera/Conjunctiva] : normal sclera/conjunctiva [PERRL] : pupils equal round and reactive to light [EOMI] : extraocular movements intact [Normal Outer Ear/Nose] : the outer ears and nose were normal in appearance [Normal Oropharynx] : the oropharynx was normal [No JVD] : no jugular venous distention [No Lymphadenopathy] : no lymphadenopathy [Supple] : supple [Thyroid Normal, No Nodules] : the thyroid was normal and there were no nodules present [No Accessory Muscle Use] : no accessory muscle use [Clear to Auscultation] : lungs were clear to auscultation bilaterally [Normal Rate] : normal rate  [Regular Rhythm] : with a regular rhythm [Normal S1, S2] : normal S1 and S2 [No Murmur] : no murmur heard [No Carotid Bruits] : no carotid bruits [No Abdominal Bruit] : a ~M bruit was not heard ~T in the abdomen [No Varicosities] : no varicosities [Pedal Pulses Present] : the pedal pulses are present [No Edema] : there was no peripheral edema [No Palpable Aorta] : no palpable aorta [No Extremity Clubbing/Cyanosis] : no extremity clubbing/cyanosis [Soft] : abdomen soft [Non Tender] : non-tender [Non-distended] : non-distended [No Masses] : no abdominal mass palpated [No HSM] : no HSM [Normal Bowel Sounds] : normal bowel sounds [Normal Posterior Cervical Nodes] : no posterior cervical lymphadenopathy [Normal Anterior Cervical Nodes] : no anterior cervical lymphadenopathy [No CVA Tenderness] : no CVA  tenderness [No Spinal Tenderness] : no spinal tenderness [No Joint Swelling] : no joint swelling [Grossly Normal Strength/Tone] : grossly normal strength/tone [No Rash] : no rash [Coordination Grossly Intact] : coordination grossly intact [No Focal Deficits] : no focal deficits [Normal Gait] : normal gait [Deep Tendon Reflexes (DTR)] : deep tendon reflexes were 2+ and symmetric [Normal Affect] : the affect was normal [Normal Insight/Judgement] : insight and judgment were intact [de-identified] : bilateral faint wheezing

## 2022-04-10 ENCOUNTER — RX RENEWAL (OUTPATIENT)
Age: 67
End: 2022-04-10

## 2022-06-09 DIAGNOSIS — R60.9 EDEMA, UNSPECIFIED: ICD-10-CM

## 2022-06-16 ENCOUNTER — LABORATORY RESULT (OUTPATIENT)
Age: 67
End: 2022-06-16

## 2022-06-16 ENCOUNTER — NON-APPOINTMENT (OUTPATIENT)
Age: 67
End: 2022-06-16

## 2022-06-16 ENCOUNTER — APPOINTMENT (OUTPATIENT)
Dept: INTERNAL MEDICINE | Facility: CLINIC | Age: 67
End: 2022-06-16
Payer: MEDICARE

## 2022-06-16 VITALS — BODY MASS INDEX: 41.3 KG/M2 | WEIGHT: 181 LBS | HEIGHT: 55.5 IN

## 2022-06-16 VITALS
HEART RATE: 100 BPM | RESPIRATION RATE: 16 BRPM | OXYGEN SATURATION: 96 % | SYSTOLIC BLOOD PRESSURE: 124 MMHG | DIASTOLIC BLOOD PRESSURE: 78 MMHG

## 2022-06-16 DIAGNOSIS — G47.9 SLEEP DISORDER, UNSPECIFIED: ICD-10-CM

## 2022-06-16 PROCEDURE — 93000 ELECTROCARDIOGRAM COMPLETE: CPT

## 2022-06-16 PROCEDURE — 36415 COLL VENOUS BLD VENIPUNCTURE: CPT

## 2022-06-16 PROCEDURE — G0439: CPT

## 2022-06-16 RX ORDER — POTASSIUM CHLORIDE 1500 MG/1
20 TABLET, FILM COATED, EXTENDED RELEASE ORAL
Qty: 180 | Refills: 0 | Status: ACTIVE | COMMUNITY
Start: 2022-04-04

## 2022-06-16 NOTE — HISTORY OF PRESENT ILLNESS
[FreeTextEntry1] : For CPE [de-identified] : For CPE\par has RA and ILD\par she has dm \par has been at baseline\par chronic steroid use\par no chest pain sob\par oxygen dependant

## 2022-06-16 NOTE — HEALTH RISK ASSESSMENT
[Excellent] : ~his/her~  mood as  excellent [Former] : Former [No] : No [No falls in past year] : Patient reported no falls in the past year [0] : 2) Feeling down, depressed, or hopeless: Not at all (0) [PHQ-2 Negative - No further assessment needed] : PHQ-2 Negative - No further assessment needed [[id number]] : 0 [Patient reported mammogram was normal] : Patient reported mammogram was normal [HIV test declined] : HIV test declined [Hepatitis C test declined] : Hepatitis C test declined [Change in mental status noted] : No change in mental status noted [Language] : denies difficulty with language [Behavior] : denies difficulty with behavior [Learning/Retaining New Information] : denies difficulty learning/retaining new information [Handling Complex Tasks] : denies difficulty handling complex tasks [Reasoning] : denies difficulty with reasoning [Spatial Ability and Orientation] : denies difficulty with spatial ability and orientation [None] : None [With Significant Other] : lives with significant other [High School] : high school [Sexually Active] : not sexually active [High Risk Behavior] : no high risk behavior [Feels Safe at Home] : Feels safe at home [Reports changes in hearing] : Reports no changes in hearing [Reports changes in vision] : Reports no changes in vision [Reports normal functional visual acuity (ie: able to read med bottle)] : Reports poor functional visual acuity.  [Reports changes in dental health] : Reports no changes in dental health [Smoke Detector] : smoke detector [Carbon Monoxide Detector] : carbon monoxide detector [Guns at Home] : no guns at home [Safety elements used in home] : safety elements used in home [Seat Belt] :  uses seat belt [Sunscreen] : does not use sunscreen [Travel to Developing Areas] : does not  travel to developing areas [TB Exposure] : is not being exposed to tuberculosis [Caregiver Concerns] : does not have caregiver concerns [MammogramDate] : 6/2022 [ColonoscopyDate] : 2019 [de-identified] : needs assistance [de-identified] : needs assistance [AdvancecareDate] : 6/16/22

## 2022-06-17 LAB
25(OH)D3 SERPL-MCNC: 74 NG/ML
ALBUMIN SERPL ELPH-MCNC: 4 G/DL
ALP BLD-CCNC: 106 U/L
ALT SERPL-CCNC: 9 U/L
ANION GAP SERPL CALC-SCNC: 17 MMOL/L
APPEARANCE: CLEAR
AST SERPL-CCNC: 12 U/L
BASOPHILS # BLD AUTO: 0.09 K/UL
BASOPHILS NFR BLD AUTO: 0.7 %
BILIRUB SERPL-MCNC: <0.2 MG/DL
BILIRUBIN URINE: NEGATIVE
BLOOD URINE: NEGATIVE
BUN SERPL-MCNC: 16 MG/DL
CALCIUM SERPL-MCNC: 9.5 MG/DL
CHLORIDE SERPL-SCNC: 94 MMOL/L
CHOLEST SERPL-MCNC: 179 MG/DL
CO2 SERPL-SCNC: 27 MMOL/L
COLOR: NORMAL
COVID-19 SPIKE DOMAIN ANTIBODY INTERPRETATION: POSITIVE
CREAT SERPL-MCNC: 0.71 MG/DL
CREAT SPEC-SCNC: 112 MG/DL
EGFR: 94 ML/MIN/1.73M2
EOSINOPHIL # BLD AUTO: 0.33 K/UL
EOSINOPHIL NFR BLD AUTO: 2.7 %
ESTIMATED AVERAGE GLUCOSE: 166 MG/DL
GLUCOSE QUALITATIVE U: NEGATIVE
GLUCOSE SERPL-MCNC: 192 MG/DL
HBA1C MFR BLD HPLC: 7.4 %
HCT VFR BLD CALC: 35 %
HDLC SERPL-MCNC: 58 MG/DL
HGB BLD-MCNC: 10.6 G/DL
IMM GRANULOCYTES NFR BLD AUTO: 0.7 %
KETONES URINE: NEGATIVE
LDLC SERPL CALC-MCNC: 75 MG/DL
LEUKOCYTE ESTERASE URINE: NEGATIVE
LYMPHOCYTES # BLD AUTO: 2.61 K/UL
LYMPHOCYTES NFR BLD AUTO: 21 %
MAN DIFF?: NORMAL
MCHC RBC-ENTMCNC: 24.3 PG
MCHC RBC-ENTMCNC: 30.3 GM/DL
MCV RBC AUTO: 80.3 FL
MICROALBUMIN 24H UR DL<=1MG/L-MCNC: <1.2 MG/DL
MICROALBUMIN/CREAT 24H UR-RTO: NORMAL MG/G
MONOCYTES # BLD AUTO: 0.89 K/UL
MONOCYTES NFR BLD AUTO: 7.2 %
NEUTROPHILS # BLD AUTO: 8.4 K/UL
NEUTROPHILS NFR BLD AUTO: 67.7 %
NITRITE URINE: NEGATIVE
NONHDLC SERPL-MCNC: 121 MG/DL
PH URINE: 5.5
PLATELET # BLD AUTO: 510 K/UL
POTASSIUM SERPL-SCNC: 3.7 MMOL/L
PROT SERPL-MCNC: 7.3 G/DL
PROTEIN URINE: NORMAL
RBC # BLD: 4.36 M/UL
RBC # FLD: 14.8 %
SARS-COV-2 AB SERPL IA-ACNC: >250 U/ML
SODIUM SERPL-SCNC: 138 MMOL/L
SPECIFIC GRAVITY URINE: 1.02
T4 FREE SERPL-MCNC: 1.6 NG/DL
TRIGL SERPL-MCNC: 231 MG/DL
TSH SERPL-ACNC: 3.39 UIU/ML
UROBILINOGEN URINE: NORMAL
WBC # FLD AUTO: 12.41 K/UL

## 2022-07-04 ENCOUNTER — RX RENEWAL (OUTPATIENT)
Age: 67
End: 2022-07-04

## 2022-07-07 ENCOUNTER — RX RENEWAL (OUTPATIENT)
Age: 67
End: 2022-07-07

## 2022-07-28 ENCOUNTER — RX RENEWAL (OUTPATIENT)
Age: 67
End: 2022-07-28

## 2022-08-24 ENCOUNTER — RX RENEWAL (OUTPATIENT)
Age: 67
End: 2022-08-24

## 2022-08-24 RX ORDER — HYOSCYAMINE SULFATE 0.12 MG/1
0.12 TABLET ORAL 4 TIMES DAILY
Qty: 30 | Refills: 3 | Status: ACTIVE | COMMUNITY
Start: 2022-01-27 | End: 1900-01-01

## 2022-09-04 ENCOUNTER — RX RENEWAL (OUTPATIENT)
Age: 67
End: 2022-09-04

## 2022-09-13 ENCOUNTER — TRANSCRIPTION ENCOUNTER (OUTPATIENT)
Age: 67
End: 2022-09-13

## 2022-09-17 ENCOUNTER — RX RENEWAL (OUTPATIENT)
Age: 67
End: 2022-09-17

## 2022-09-17 RX ORDER — TRAZODONE HYDROCHLORIDE 50 MG/1
50 TABLET ORAL
Qty: 90 | Refills: 1 | Status: ACTIVE | COMMUNITY
Start: 2022-06-16 | End: 1900-01-01

## 2022-10-01 ENCOUNTER — RX RENEWAL (OUTPATIENT)
Age: 67
End: 2022-10-01

## 2022-10-25 ENCOUNTER — RX RENEWAL (OUTPATIENT)
Age: 67
End: 2022-10-25

## 2022-11-08 ENCOUNTER — APPOINTMENT (OUTPATIENT)
Dept: INTERNAL MEDICINE | Facility: CLINIC | Age: 67
End: 2022-11-08

## 2022-11-17 NOTE — REASON FOR VISIT
No. TACO screening performed.  STOP BANG Legend: 0-2 = LOW Risk; 3-4 = INTERMEDIATE Risk; 5-8 = HIGH Risk [Annual Wellness Visit] : an annual wellness visit

## 2023-02-07 ENCOUNTER — APPOINTMENT (OUTPATIENT)
Dept: INTERNAL MEDICINE | Facility: CLINIC | Age: 68
End: 2023-02-07

## 2023-03-24 NOTE — ADVANCED PRACTICE NURSE CONSULT - RECOMMEDATIONS
35 year old  at 36w1d weeks presents to the clinic for a routine prenatal visit.  No concerns.  No vaginal bleeding, leakage of fluid, or contractions  Fundal height=36cm  SSXd=155  CX=Cl/20/-3  Vertex by BSUS  GBS done today  Anemia=pt is taking additioanl iron supplenets  Labor precautions discussed  RTC 1 week    Claudia Marvin DO       continue diabetes self management education  pls confirm ef prior to continuing metformin  cc- pls task pt to diabetes wellness out pt

## 2023-03-30 ENCOUNTER — APPOINTMENT (OUTPATIENT)
Dept: INTERNAL MEDICINE | Facility: CLINIC | Age: 68
End: 2023-03-30
Payer: MEDICARE

## 2023-03-30 VITALS
SYSTOLIC BLOOD PRESSURE: 128 MMHG | BODY MASS INDEX: 41.52 KG/M2 | OXYGEN SATURATION: 94 % | DIASTOLIC BLOOD PRESSURE: 84 MMHG | HEART RATE: 83 BPM | RESPIRATION RATE: 18 BRPM | HEIGHT: 55.5 IN | WEIGHT: 182 LBS

## 2023-03-30 DIAGNOSIS — J84.10 PULMONARY FIBROSIS, UNSPECIFIED: ICD-10-CM

## 2023-03-30 DIAGNOSIS — D63.8 ANEMIA IN OTHER CHRONIC DISEASES CLASSIFIED ELSEWHERE: ICD-10-CM

## 2023-03-30 PROCEDURE — 36415 COLL VENOUS BLD VENIPUNCTURE: CPT

## 2023-03-30 PROCEDURE — 99214 OFFICE O/P EST MOD 30 MIN: CPT | Mod: 25

## 2023-03-30 RX ORDER — SEMAGLUTIDE 1.34 MG/ML
2 INJECTION, SOLUTION SUBCUTANEOUS
Qty: 3 | Refills: 5 | Status: ACTIVE | COMMUNITY
Start: 2023-03-30 | End: 1900-01-01

## 2023-03-30 NOTE — PHYSICAL EXAM
[No Acute Distress] : no acute distress [Well Nourished] : well nourished [Well Developed] : well developed [Well-Appearing] : well-appearing [Normal Sclera/Conjunctiva] : normal sclera/conjunctiva [PERRL] : pupils equal round and reactive to light [EOMI] : extraocular movements intact [Normal Outer Ear/Nose] : the outer ears and nose were normal in appearance [Normal Oropharynx] : the oropharynx was normal [No JVD] : no jugular venous distention [No Lymphadenopathy] : no lymphadenopathy [Supple] : supple [Thyroid Normal, No Nodules] : the thyroid was normal and there were no nodules present [No Respiratory Distress] : no respiratory distress  [No Accessory Muscle Use] : no accessory muscle use [Normal Rate] : normal rate  [Regular Rhythm] : with a regular rhythm [Normal S1, S2] : normal S1 and S2 [No Murmur] : no murmur heard [No Carotid Bruits] : no carotid bruits [No Abdominal Bruit] : a ~M bruit was not heard ~T in the abdomen [No Varicosities] : no varicosities [Pedal Pulses Present] : the pedal pulses are present [No Edema] : there was no peripheral edema [No Palpable Aorta] : no palpable aorta [No Extremity Clubbing/Cyanosis] : no extremity clubbing/cyanosis [Soft] : abdomen soft [Non Tender] : non-tender [Non-distended] : non-distended [No Masses] : no abdominal mass palpated [No HSM] : no HSM [Normal Bowel Sounds] : normal bowel sounds [Normal Posterior Cervical Nodes] : no posterior cervical lymphadenopathy [Normal Anterior Cervical Nodes] : no anterior cervical lymphadenopathy [No CVA Tenderness] : no CVA  tenderness [No Spinal Tenderness] : no spinal tenderness [No Joint Swelling] : no joint swelling [Grossly Normal Strength/Tone] : grossly normal strength/tone [No Rash] : no rash [Coordination Grossly Intact] : coordination grossly intact [No Focal Deficits] : no focal deficits [Normal Gait] : normal gait [Deep Tendon Reflexes (DTR)] : deep tendon reflexes were 2+ and symmetric [Normal Affect] : the affect was normal [Normal Insight/Judgement] : insight and judgment were intact [de-identified] : bilatarel velestero sounding l ungs

## 2023-03-30 NOTE — ASSESSMENT
[FreeTextEntry1] : dm check a1c add ozempic patient reqeust\par bp stable\par pulm fibrosis/ild continue meds\par hld to be checked\par RF sed rate, cyclic citrulline ab\par \par

## 2023-03-30 NOTE — HEALTH RISK ASSESSMENT
[No] : No [No falls in past year] : Patient reported no falls in the past year [0] : 2) Feeling down, depressed, or hopeless: Not at all (0) [PHQ-2 Negative - No further assessment needed] : PHQ-2 Negative - No further assessment needed [[id number]] : 0 [AdvancecareDate] : 3/20/23 [Never] : Never

## 2023-03-30 NOTE — HISTORY OF PRESENT ILLNESS
[FreeTextEntry1] : follow up RA, DM, Pulm Fibrosis [de-identified] : follow up RA DM Pulm Fibrosis\par has been doing well\par saw pulm and PFT a bit worst but she is oxygenationg well on supplemental )2\par here with relative\par would like to take a trip to

## 2023-03-31 LAB
ALBUMIN SERPL ELPH-MCNC: 4.1 G/DL
ALP BLD-CCNC: 115 U/L
ALT SERPL-CCNC: 14 U/L
ANION GAP SERPL CALC-SCNC: 19 MMOL/L
AST SERPL-CCNC: 15 U/L
BASOPHILS # BLD AUTO: 0.08 K/UL
BASOPHILS NFR BLD AUTO: 0.7 %
BILIRUB SERPL-MCNC: 0.2 MG/DL
BUN SERPL-MCNC: 13 MG/DL
CALCIUM SERPL-MCNC: 9.9 MG/DL
CHLORIDE SERPL-SCNC: 90 MMOL/L
CHOLEST SERPL-MCNC: 191 MG/DL
CO2 SERPL-SCNC: 28 MMOL/L
CREAT SERPL-MCNC: 0.72 MG/DL
EGFR: 92 ML/MIN/1.73M2
EOSINOPHIL # BLD AUTO: 0.2 K/UL
EOSINOPHIL NFR BLD AUTO: 1.7 %
ERYTHROCYTE [SEDIMENTATION RATE] IN BLOOD BY WESTERGREN METHOD: 68 MM/HR
ESTIMATED AVERAGE GLUCOSE: 209 MG/DL
GLUCOSE SERPL-MCNC: 246 MG/DL
HBA1C MFR BLD HPLC: 8.9 %
HCT VFR BLD CALC: 36.7 %
HDLC SERPL-MCNC: 63 MG/DL
HGB BLD-MCNC: 10.9 G/DL
IMM GRANULOCYTES NFR BLD AUTO: 0.5 %
LDLC SERPL CALC-MCNC: 85 MG/DL
LYMPHOCYTES # BLD AUTO: 2.31 K/UL
LYMPHOCYTES NFR BLD AUTO: 19.3 %
MAN DIFF?: NORMAL
MCHC RBC-ENTMCNC: 23.8 PG
MCHC RBC-ENTMCNC: 29.7 GM/DL
MCV RBC AUTO: 80.1 FL
MONOCYTES # BLD AUTO: 0.6 K/UL
MONOCYTES NFR BLD AUTO: 5 %
NEUTROPHILS # BLD AUTO: 8.72 K/UL
NEUTROPHILS NFR BLD AUTO: 72.8 %
NONHDLC SERPL-MCNC: 128 MG/DL
PLATELET # BLD AUTO: 443 K/UL
POTASSIUM SERPL-SCNC: 3.5 MMOL/L
PROT SERPL-MCNC: 7.3 G/DL
RBC # BLD: 4.58 M/UL
RBC # FLD: 15.6 %
SODIUM SERPL-SCNC: 137 MMOL/L
TRIGL SERPL-MCNC: 218 MG/DL
WBC # FLD AUTO: 11.97 K/UL

## 2023-04-04 LAB
CCP AB SER IA-ACNC: >250 UNITS
RF+CCP IGG SER-IMP: ABNORMAL

## 2023-04-07 ENCOUNTER — RX RENEWAL (OUTPATIENT)
Age: 68
End: 2023-04-07

## 2023-06-26 ENCOUNTER — RX RENEWAL (OUTPATIENT)
Age: 68
End: 2023-06-26

## 2023-07-20 NOTE — ED ADULT NURSE NOTE - NS ED NURSE LEVEL OF CONSCIOUSNESS AFFECT
PCP:  Simón Aguilar MD    Chief Complaint   Patient presents with   • Left Knee - Pain   • Right Knee - Pain   • Office Visit       HPI:  Adjgregorio Alcaraz is a 64 year old male.  He is in the office today for Bilateral Knee pain which has been going on for several years but has gotten worse over the last several months. He states that the Right Knee is worse than the Left. His pain is mostly anterior, spanning the joint line. He notes that the pain is the worst in the morning when he first stands up to walk. He denies any previous injury, antecedent pain or surgeries. He previously has seen another provider who had told him he needed surgery but he wasn't ready for this yet. He has previously had cortisone injections for temporary relief, last being several years ago. He has not tried gel injections. He has previously attended physical therapy, last year was his last session. He attended from July-August 2022. He does not take any medications for this, does not use anything topical or modalities.     Localizes the pain to the medial aspect of both knees and behind the kneecap.  The right knee is worse.    Patient was referred for Consultation by Simón Aguilar MD     Visit Vitals  Ht 5' 8\" (1.727 m)   Wt 92 kg (202 lb 13.2 oz)   BMI 30.84 kg/m²       Pain Scale: 6  Date of Injury:  none  Work Related: no  Current Position:  Housekeeping in a warehouse, walking, lifting up to 50lbs     Past Medical History:   Diagnosis Date   • BPH (benign prostatic hyperplasia)    • Erectile dysfunction    • Essential (primary) hypertension    • Gastroesophageal reflux disease    • Hyperlipidemia    • Inguinal hernia    • RAD (reactive airway disease)        Past Surgical History:   Procedure Laterality Date   • Cholecystectomy     • Colonoscopy     • Hernia repair Right     inguinal   • Removal gallbladder         Social History     Socioeconomic History   • Marital status: Single     Spouse name: Not on file   • Number of children:  Not on file   • Years of education: Not on file   • Highest education level: Not on file   Occupational History   • Not on file   Tobacco Use   • Smoking status: Never   • Smokeless tobacco: Never   Vaping Use   • Vaping Use: never used   Substance and Sexual Activity   • Alcohol use: Never   • Drug use: Never   • Sexual activity: Not on file   Other Topics Concern   • Not on file   Social History Narrative   • Not on file     Social Determinants of Health     Financial Resource Strain: Not on file   Food Insecurity: Not on file   Transportation Needs: Not on file   Physical Activity: Not on file   Stress: Not on file   Social Connections: Not on file   Intimate Partner Violence: Not At Risk (11/23/2021)    Intimate Partner Violence    • Social Determinants: Intimate Partner Violence Past Fear: No    • Social Determinants: Intimate Partner Violence Current Fear: No       ALLERGIES:  No Known Allergies    Current Outpatient Medications   Medication Sig Dispense Refill   • amLODIPine (NORVASC) 10 MG tablet Take 1 tablet by mouth daily. 90 tablet 3   • tadalafil (CIALIS) 5 MG tablet Take 1 tablet by mouth daily. 30 tablet 3   • rosuvastatin (CRESTOR) 20 MG tablet Take 1 tablet by mouth daily. Takes at night 90 tablet 3   • irbesartan (AVAPRO) 300 MG tablet TAKE 1 TABLET BY MOUTH EVERY NIGHT 90 tablet 3   • metoPROLOL succinate (TOPROL-XL) 100 MG 24 hr tablet TAKE 1 TABLET BY MOUTH DAILY 90 tablet 3   • ASPIRIN 81 PO Take by mouth daily.     • Ascorbic Acid (VITAMIN C PO) Take by mouth daily.       No current facility-administered medications for this visit.         Review of Systems:  All systems reviewed and are negative except as noted above or in the HPI/Patient intake form.    Physical Exam:  Constitutional: Oriented to person, place, and time. Appears well-developed and well-nourished. No distress.   HENT:   Head: Normocephalic and atraumatic.   Nose: No nasal deformity.   Mouth/Throat: Normal dentition.   Eyes:  Conjunctivae and EOM are normal.   Cardiovascular: Normal rate and intact distal pulses.   Pulmonary/Chest: Effort normal. No stridor. No respiratory distress.   Neurological: Alert and oriented to person, place, and time.   Skin: Skin is warm and intact.   Psychiatric: Has a normal mood and affect. Behavior is normal.     Right Knee Exam     Muscle Strength   The patient has normal right knee strength.    Tenderness   The patient is experiencing tenderness in the medial joint line.    Range of Motion   Extension: 5   Flexion: 130     Tests   Janell:  Medial - negative Lateral - negative  Varus: negative Valgus: negative  Lachman:  Anterior - negative      Drawer:  Anterior - negative    Posterior - negative  Patellar apprehension: negative    Other   Erythema: absent  Sensation: normal  Pulse: present  Swelling: none  Effusion: no effusion present      Left Knee Exam     Muscle Strength   The patient has normal left knee strength.    Tenderness   The patient is experiencing tenderness in the medial joint line.    Range of Motion   Extension: 5   Flexion: 130     Tests   Janell:  Medial - negative Lateral - negative  Varus: negative Valgus: negative  Lachman:  Anterior - negative      Drawer:  Anterior - negative     Posterior - negative  Patellar apprehension: negative    Other   Erythema: absent  Sensation: normal  Pulse: present  Swelling: none  Effusion: no effusion present            Imaging Reading/Results:    XR KNEE 4 OR MORE VIEWS  BILATERAL  Narrative: EXAM:  XR Knee bilateral 4 views    INDICATION:  Bilateral  knee pain    FINDINGS:       PA 45 degree and AP Weightbearing, lateral, and sunrise views show   advanced arthritic changes in the medial compartment with joint space   narrowing, subchondral sclerosis, and osteophyte formation in the right   knee and    advanced arthritic changes in the medial compartment with joint space   narrowing, subchondral sclerosis, and osteophyte formation in the left    knee.  Impression: 1)  advanced osteoarthritis of the right knee    2)  advanced osteoarthritis of the left knee        Assessment/Plan:    Bilateral primary osteoarthritis of knee  - XR KNEE 4 OR MORE VIEWS  BILATERAL      The patient has arthritis of the knee.  I reviewed the exam findings, radiological findings, and discussed the non-operative and operative treatment options including pros and cons.  Non-operative treatment includes weight loss, physical therapy, oral pain medication, cortisone injection, and viscosupplementation (gel injections).  Surgical treatment would consist of a knee replacement.    He is interested in cortisone injections.  He tolerated both injections well.  Follow-up in 6 weeks for repeat evaluation.L Inj/Asp: bilateral knee on 7/20/2023 9:07 AM  Indications: pain  Details: 22 G needle, anterolateral approach  Medications (Right): 40 mg methylPREDNISolone ACETATE long-acting DEPOT 40 MG/ML; 5 mL lidocaine 1 %  Medications (Left): 40 mg methylPREDNISolone ACETATE long-acting DEPOT 40 MG/ML; 5 mL lidocaine 1 %  Outcome: tolerated well, no immediate complications    PROCEDURE IN DETAIL:   Risks and benefits were discussed, and verbal consent was obtained to proceed with corticosteroid injection. The patient was placed in a seated position. The skin over the hermes-lateral knee was prepped with alcohol and Chloropep using aseptic technique. The skin was numbed with a vapocoolant spray. A 22-gauge 1-1/2 inch needle was passed through the hermes-lateral portal site into the knee joint, and 40 mg of Depomedrol and 5 ml 1% Lidocaine without Epinephrine were injected. A bandage was applied. The patient tolerated the procedures well, and no complications arose.    Procedure, treatment alternatives, risks and benefits explained, specific risks discussed. Consent was given by the patient. Immediately prior to procedure a time out was called to verify the correct patient, procedure, equipment,  Calm support staff and site/side marked as required. Patient was prepped and draped in the usual sterile fashion.             Tyron Pfeiffer M.D    Orthopedic Surgery  [facility]  P: [phone]  F: [phone]

## 2023-07-20 NOTE — ASSESSMENT
[FreeTextEntry1] : has lost weight a1c is \par pulm fibrosis stble\par lac hydrin for dry skin\par flu vaccine given
n/a

## 2023-09-12 ENCOUNTER — RX RENEWAL (OUTPATIENT)
Age: 68
End: 2023-09-12

## 2023-09-19 ENCOUNTER — APPOINTMENT (OUTPATIENT)
Dept: INTERNAL MEDICINE | Facility: CLINIC | Age: 68
End: 2023-09-19
Payer: MEDICARE

## 2023-09-19 ENCOUNTER — NON-APPOINTMENT (OUTPATIENT)
Age: 68
End: 2023-09-19

## 2023-09-19 VITALS
RESPIRATION RATE: 18 BRPM | HEART RATE: 97 BPM | SYSTOLIC BLOOD PRESSURE: 116 MMHG | HEIGHT: 55.5 IN | BODY MASS INDEX: 39.7 KG/M2 | WEIGHT: 174 LBS | DIASTOLIC BLOOD PRESSURE: 82 MMHG | OXYGEN SATURATION: 98 %

## 2023-09-19 VITALS — WEIGHT: 174 LBS | HEIGHT: 55.5 IN | BODY MASS INDEX: 39.7 KG/M2

## 2023-09-19 DIAGNOSIS — E11.9 TYPE 2 DIABETES MELLITUS W/OUT COMPLICATIONS: ICD-10-CM

## 2023-09-19 DIAGNOSIS — M06.9 RHEUMATOID ARTHRITIS, UNSPECIFIED: ICD-10-CM

## 2023-09-19 DIAGNOSIS — I10 ESSENTIAL (PRIMARY) HYPERTENSION: ICD-10-CM

## 2023-09-19 DIAGNOSIS — Z23 ENCOUNTER FOR IMMUNIZATION: ICD-10-CM

## 2023-09-19 DIAGNOSIS — Z00.00 ENCOUNTER FOR GENERAL ADULT MEDICAL EXAMINATION W/OUT ABNORMAL FINDINGS: ICD-10-CM

## 2023-09-19 DIAGNOSIS — E78.5 HYPERLIPIDEMIA, UNSPECIFIED: ICD-10-CM

## 2023-09-19 DIAGNOSIS — J84.9 INTERSTITIAL PULMONARY DISEASE, UNSPECIFIED: ICD-10-CM

## 2023-09-19 PROCEDURE — 36415 COLL VENOUS BLD VENIPUNCTURE: CPT

## 2023-09-19 PROCEDURE — G0439: CPT

## 2023-09-19 PROCEDURE — 90662 IIV NO PRSV INCREASED AG IM: CPT

## 2023-09-19 PROCEDURE — 93000 ELECTROCARDIOGRAM COMPLETE: CPT

## 2023-09-19 PROCEDURE — G0008: CPT

## 2023-09-19 RX ORDER — SITAGLIPTIN 50 MG/1
50 TABLET, FILM COATED ORAL DAILY
Qty: 90 | Refills: 3 | Status: ACTIVE | COMMUNITY
Start: 2023-09-19 | End: 1900-01-01

## 2023-09-20 LAB
ALBUMIN SERPL ELPH-MCNC: 4.1 G/DL
ALP BLD-CCNC: 81 U/L
ALT SERPL-CCNC: 13 U/L
ANION GAP SERPL CALC-SCNC: 20 MMOL/L
APPEARANCE: CLEAR
AST SERPL-CCNC: 15 U/L
BACTERIA: NEGATIVE /HPF
BASOPHILS # BLD AUTO: 0.07 K/UL
BASOPHILS NFR BLD AUTO: 0.7 %
BILIRUB SERPL-MCNC: 0.2 MG/DL
BILIRUBIN URINE: NEGATIVE
BLOOD URINE: NEGATIVE
BUN SERPL-MCNC: 10 MG/DL
CALCIUM SERPL-MCNC: 7.3 MG/DL
CAST: 2 /LPF
CHLORIDE SERPL-SCNC: 87 MMOL/L
CHOLEST SERPL-MCNC: 143 MG/DL
CO2 SERPL-SCNC: 28 MMOL/L
COLOR: YELLOW
CREAT SERPL-MCNC: 0.8 MG/DL
CREAT SPEC-SCNC: 96 MG/DL
EGFR: 81 ML/MIN/1.73M2
EOSINOPHIL # BLD AUTO: 0.34 K/UL
EOSINOPHIL NFR BLD AUTO: 3.5 %
EPITHELIAL CELLS: 7 /HPF
ESTIMATED AVERAGE GLUCOSE: 200 MG/DL
GLUCOSE QUALITATIVE U: NEGATIVE MG/DL
GLUCOSE SERPL-MCNC: 156 MG/DL
HBA1C MFR BLD HPLC: 8.6 %
HCT VFR BLD CALC: 32.2 %
HDLC SERPL-MCNC: 53 MG/DL
HGB BLD-MCNC: 9.7 G/DL
IMM GRANULOCYTES NFR BLD AUTO: 0.6 %
KETONES URINE: NEGATIVE MG/DL
LDLC SERPL CALC-MCNC: 63 MG/DL
LEUKOCYTE ESTERASE URINE: ABNORMAL
LYMPHOCYTES # BLD AUTO: 2.62 K/UL
LYMPHOCYTES NFR BLD AUTO: 26.7 %
MAN DIFF?: NORMAL
MCHC RBC-ENTMCNC: 23.5 PG
MCHC RBC-ENTMCNC: 30.1 GM/DL
MCV RBC AUTO: 78.2 FL
MICROALBUMIN 24H UR DL<=1MG/L-MCNC: <1.2 MG/DL
MICROALBUMIN/CREAT 24H UR-RTO: NORMAL MG/G
MICROSCOPIC-UA: NORMAL
MONOCYTES # BLD AUTO: 0.51 K/UL
MONOCYTES NFR BLD AUTO: 5.2 %
NEUTROPHILS # BLD AUTO: 6.21 K/UL
NEUTROPHILS NFR BLD AUTO: 63.3 %
NITRITE URINE: NEGATIVE
NONHDLC SERPL-MCNC: 89 MG/DL
PH URINE: 6.5
PLATELET # BLD AUTO: 522 K/UL
POTASSIUM SERPL-SCNC: 3 MMOL/L
PROT SERPL-MCNC: 6.5 G/DL
PROTEIN URINE: NEGATIVE MG/DL
RBC # BLD: 4.12 M/UL
RBC # FLD: 14.9 %
RED BLOOD CELLS URINE: 1 /HPF
SODIUM SERPL-SCNC: 135 MMOL/L
SPECIFIC GRAVITY URINE: 1.01
T4 FREE SERPL-MCNC: 1.7 NG/DL
TRIGL SERPL-MCNC: 156 MG/DL
TSH SERPL-ACNC: 4.71 UIU/ML
UROBILINOGEN URINE: 0.2 MG/DL
WBC # FLD AUTO: 9.81 K/UL
WHITE BLOOD CELLS URINE: 2 /HPF

## 2023-10-02 ENCOUNTER — RX RENEWAL (OUTPATIENT)
Age: 68
End: 2023-10-02

## 2023-10-04 ENCOUNTER — RX RENEWAL (OUTPATIENT)
Age: 68
End: 2023-10-04

## 2023-10-11 NOTE — PHYSICAL EXAM
[No Acute Distress] : no acute distress [Well Nourished] : well nourished [Well Developed] : well developed [Well-Appearing] : well-appearing [Normal Sclera/Conjunctiva] : normal sclera/conjunctiva [PERRL] : pupils equal round and reactive to light [EOMI] : extraocular movements intact [Normal Outer Ear/Nose] : the outer ears and nose were normal in appearance [Normal Oropharynx] : the oropharynx was normal [No JVD] : no jugular venous distention [No Lymphadenopathy] : no lymphadenopathy [Supple] : supple [Thyroid Normal, No Nodules] : the thyroid was normal and there were no nodules present [Normal Rate] : normal rate  [Normal S1, S2] : normal S1 and S2 [No Murmur] : no murmur heard [No Carotid Bruits] : no carotid bruits [No Abdominal Bruit] : a ~M bruit was not heard ~T in the abdomen [No Varicosities] : no varicosities [Pedal Pulses Present] : the pedal pulses are present [No Edema] : there was no peripheral edema [No Palpable Aorta] : no palpable aorta [No Extremity Clubbing/Cyanosis] : no extremity clubbing/cyanosis [Soft] : abdomen soft [Non Tender] : non-tender [Non-distended] : non-distended [No Masses] : no abdominal mass palpated [No HSM] : no HSM [Normal Posterior Cervical Nodes] : no posterior cervical lymphadenopathy [Normal Bowel Sounds] : normal bowel sounds [Normal Anterior Cervical Nodes] : no anterior cervical lymphadenopathy [No CVA Tenderness] : no CVA  tenderness [No Spinal Tenderness] : no spinal tenderness [No Joint Swelling] : no joint swelling [Grossly Normal Strength/Tone] : grossly normal strength/tone [Coordination Grossly Intact] : coordination grossly intact [No Rash] : no rash [No Focal Deficits] : no focal deficits [Normal Gait] : normal gait [Deep Tendon Reflexes (DTR)] : deep tendon reflexes were 2+ and symmetric [Normal Affect] : the affect was normal [Normal Insight/Judgement] : insight and judgment were intact [de-identified] : bilateral velcro sounding lungs [de-identified] : mildly tachy Griseofulvin Counseling:  I discussed with the patient the risks of griseofulvin including but not limited to photosensitivity, cytopenia, liver damage, nausea/vomiting and severe allergy.  The patient understands that this medication is best absorbed when taken with a fatty meal (e.g., ice cream or french fries).

## 2023-10-25 ENCOUNTER — RX RENEWAL (OUTPATIENT)
Age: 68
End: 2023-10-25

## 2023-10-25 RX ORDER — BISOPROLOL FUMARATE AND HYDROCHLOROTHIAZIDE 10; 6.25 MG/1; MG/1
10-6.25 TABLET, FILM COATED ORAL
Qty: 90 | Refills: 3 | Status: ACTIVE | COMMUNITY
Start: 2019-10-21 | End: 1900-01-01

## 2023-12-11 ENCOUNTER — RX RENEWAL (OUTPATIENT)
Age: 68
End: 2023-12-11

## 2024-01-15 RX ORDER — METFORMIN HYDROCHLORIDE 500 MG/1
500 TABLET, COATED ORAL
Qty: 180 | Refills: 3 | Status: ACTIVE | COMMUNITY
Start: 2019-10-21 | End: 1900-01-01

## 2024-01-19 ENCOUNTER — APPOINTMENT (OUTPATIENT)
Dept: INTERNAL MEDICINE | Facility: CLINIC | Age: 69
End: 2024-01-19

## 2024-01-30 RX ORDER — LANCETS
EACH MISCELLANEOUS
Qty: 2 | Refills: 2 | Status: ACTIVE | COMMUNITY
Start: 2024-01-30 | End: 1900-01-01

## 2024-01-30 RX ORDER — BLOOD-GLUCOSE METER
W/DEVICE EACH MISCELLANEOUS
Qty: 1 | Refills: 0 | Status: DISCONTINUED | COMMUNITY
Start: 2024-01-30 | End: 2024-01-30

## 2024-01-30 RX ORDER — BLOOD SUGAR DIAGNOSTIC
STRIP MISCELLANEOUS
Qty: 200 | Refills: 2 | Status: ACTIVE | COMMUNITY
Start: 2024-01-30 | End: 1900-01-01

## 2024-01-30 RX ORDER — LANCETS 28 GAUGE
EACH MISCELLANEOUS
Qty: 1 | Refills: 4 | Status: DISCONTINUED | COMMUNITY
Start: 2021-06-09 | End: 2024-01-30

## 2024-01-30 RX ORDER — BLOOD SUGAR DIAGNOSTIC
STRIP MISCELLANEOUS TWICE DAILY
Qty: 180 | Refills: 3 | Status: DISCONTINUED | COMMUNITY
Start: 2024-01-30 | End: 2024-01-30

## 2024-01-30 RX ORDER — BLOOD-GLUCOSE METER
W/DEVICE EACH MISCELLANEOUS
Qty: 1 | Refills: 0 | Status: ACTIVE | COMMUNITY
Start: 2024-01-30 | End: 1900-01-01

## 2024-01-30 RX ORDER — BLOOD SUGAR DIAGNOSTIC
STRIP MISCELLANEOUS TWICE DAILY
Qty: 2 | Refills: 3 | Status: DISCONTINUED | COMMUNITY
Start: 2024-01-29 | End: 2024-01-30

## 2024-01-30 RX ORDER — BLOOD-GLUCOSE METER
KIT MISCELLANEOUS
Qty: 1 | Refills: 0 | Status: DISCONTINUED | COMMUNITY
Start: 2021-06-09 | End: 2024-01-30

## 2024-02-28 ENCOUNTER — RX RENEWAL (OUTPATIENT)
Age: 69
End: 2024-02-28

## 2024-03-10 ENCOUNTER — RX RENEWAL (OUTPATIENT)
Age: 69
End: 2024-03-10

## 2024-03-10 RX ORDER — HYDROCHLOROTHIAZIDE 12.5 MG/1
12.5 TABLET ORAL
Qty: 90 | Refills: 1 | Status: ACTIVE | COMMUNITY
Start: 2019-10-21 | End: 1900-01-01

## 2024-05-19 ENCOUNTER — RX RENEWAL (OUTPATIENT)
Age: 69
End: 2024-05-19

## 2024-05-19 RX ORDER — GABAPENTIN 300 MG/1
300 CAPSULE ORAL
Qty: 180 | Refills: 2 | Status: ACTIVE | COMMUNITY
Start: 2020-12-14 | End: 1900-01-01

## 2024-06-20 ENCOUNTER — RX RENEWAL (OUTPATIENT)
Age: 69
End: 2024-06-20

## 2024-06-20 RX ORDER — FUROSEMIDE 20 MG/1
20 TABLET ORAL
Qty: 90 | Refills: 1 | Status: ACTIVE | COMMUNITY
Start: 2022-06-09 | End: 1900-01-01

## 2024-06-20 RX ORDER — ATORVASTATIN CALCIUM 20 MG/1
20 TABLET, FILM COATED ORAL
Qty: 90 | Refills: 3 | Status: ACTIVE | COMMUNITY
Start: 2020-06-28 | End: 1900-01-01

## 2024-07-05 RX ORDER — AZITHROMYCIN 250 MG/1
250 TABLET, FILM COATED ORAL
Qty: 1 | Refills: 0 | Status: ACTIVE | COMMUNITY
Start: 2024-07-05 | End: 1900-01-01

## 2024-08-02 ENCOUNTER — APPOINTMENT (OUTPATIENT)
Dept: INTERNAL MEDICINE | Facility: CLINIC | Age: 69
End: 2024-08-02
Payer: MEDICARE

## 2024-08-02 VITALS
OXYGEN SATURATION: 94 % | BODY MASS INDEX: 39.7 KG/M2 | SYSTOLIC BLOOD PRESSURE: 118 MMHG | HEIGHT: 55.5 IN | DIASTOLIC BLOOD PRESSURE: 72 MMHG | HEART RATE: 85 BPM | RESPIRATION RATE: 12 BRPM | WEIGHT: 174 LBS

## 2024-08-02 DIAGNOSIS — J84.10 PULMONARY FIBROSIS, UNSPECIFIED: ICD-10-CM

## 2024-08-02 DIAGNOSIS — J84.9 INTERSTITIAL PULMONARY DISEASE, UNSPECIFIED: ICD-10-CM

## 2024-08-02 DIAGNOSIS — E78.5 HYPERLIPIDEMIA, UNSPECIFIED: ICD-10-CM

## 2024-08-02 DIAGNOSIS — M06.9 RHEUMATOID ARTHRITIS, UNSPECIFIED: ICD-10-CM

## 2024-08-02 DIAGNOSIS — E11.9 TYPE 2 DIABETES MELLITUS W/OUT COMPLICATIONS: ICD-10-CM

## 2024-08-02 PROCEDURE — 99214 OFFICE O/P EST MOD 30 MIN: CPT

## 2024-08-02 PROCEDURE — 36415 COLL VENOUS BLD VENIPUNCTURE: CPT

## 2024-08-02 PROCEDURE — G2211 COMPLEX E/M VISIT ADD ON: CPT

## 2024-08-02 RX ORDER — BUDESONIDE, GLYCOPYRROLATE, AND FORMOTEROL FUMARATE 160; 9; 4.8 UG/1; UG/1; UG/1
160-9-4.8 AEROSOL, METERED RESPIRATORY (INHALATION) TWICE DAILY
Qty: 1 | Refills: 3 | Status: ACTIVE | COMMUNITY
Start: 2024-08-02 | End: 1900-01-01

## 2024-08-02 NOTE — ASSESSMENT
[FreeTextEntry1] : Pulm fibrosis add inhaler see pulm for PFT and montoring of lung function currently gets 3 hours a day of HHA will need at least 6 hours given her use of oxygen now more frecuent and longer periods of the day dm stable htn stable hld to be checked

## 2024-08-02 NOTE — HISTORY OF PRESENT ILLNESS
[FreeTextEntry1] : follow up dm ,  pulm fibrosis anemia [de-identified] : follwo up dm pulm fibrois, o2 dependent not using inhaler, has some hypoxia when not using oxygen at times does not need it all day long not coughing and is taking her meds

## 2024-08-02 NOTE — PHYSICAL EXAM
[No Acute Distress] : no acute distress [Well Nourished] : well nourished [Well Developed] : well developed [Well-Appearing] : well-appearing [Normal Sclera/Conjunctiva] : normal sclera/conjunctiva [PERRL] : pupils equal round and reactive to light [EOMI] : extraocular movements intact [Normal Outer Ear/Nose] : the outer ears and nose were normal in appearance [Normal Oropharynx] : the oropharynx was normal [No JVD] : no jugular venous distention [No Lymphadenopathy] : no lymphadenopathy [Supple] : supple [Thyroid Normal, No Nodules] : the thyroid was normal and there were no nodules present [No Respiratory Distress] : no respiratory distress  [No Accessory Muscle Use] : no accessory muscle use [Normal Rate] : normal rate  [Regular Rhythm] : with a regular rhythm [Normal S1, S2] : normal S1 and S2 [No Murmur] : no murmur heard [No Carotid Bruits] : no carotid bruits [No Abdominal Bruit] : a ~M bruit was not heard ~T in the abdomen [No Varicosities] : no varicosities [Pedal Pulses Present] : the pedal pulses are present [No Edema] : there was no peripheral edema [No Palpable Aorta] : no palpable aorta [No Extremity Clubbing/Cyanosis] : no extremity clubbing/cyanosis [Soft] : abdomen soft [Non Tender] : non-tender [Non-distended] : non-distended [No Masses] : no abdominal mass palpated [No HSM] : no HSM [Normal Bowel Sounds] : normal bowel sounds [Normal Posterior Cervical Nodes] : no posterior cervical lymphadenopathy [Normal Anterior Cervical Nodes] : no anterior cervical lymphadenopathy [No CVA Tenderness] : no CVA  tenderness [No Spinal Tenderness] : no spinal tenderness [No Joint Swelling] : no joint swelling [Grossly Normal Strength/Tone] : grossly normal strength/tone [No Rash] : no rash [Coordination Grossly Intact] : coordination grossly intact [No Focal Deficits] : no focal deficits [Normal Gait] : normal gait [Deep Tendon Reflexes (DTR)] : deep tendon reflexes were 2+ and symmetric [Normal Affect] : the affect was normal [Normal Insight/Judgement] : insight and judgment were intact [de-identified] : judith

## 2024-08-02 NOTE — HEALTH RISK ASSESSMENT
[No] : No [No falls in past year] : Patient reported no falls in the past year [Little interest or pleasure doing things] : 1) Little interest or pleasure doing things [Feeling down, depressed, or hopeless] : 2) Feeling down, depressed, or hopeless [0] : 2) Feeling down, depressed, or hopeless: Not at all (0) [PHQ-2 Negative - No further assessment needed] : PHQ-2 Negative - No further assessment needed [[id number]] : 0 [Never] : Never

## 2024-08-03 LAB
ALBUMIN SERPL ELPH-MCNC: 4 G/DL
ALP BLD-CCNC: 135 U/L
ALT SERPL-CCNC: 8 U/L
ANION GAP SERPL CALC-SCNC: 20 MMOL/L
AST SERPL-CCNC: 19 U/L
BASOPHILS # BLD AUTO: 0.1 K/UL
BASOPHILS NFR BLD AUTO: 0.7 %
BILIRUB SERPL-MCNC: 0.2 MG/DL
BUN SERPL-MCNC: 19 MG/DL
CALCIUM SERPL-MCNC: 9.7 MG/DL
CHLORIDE SERPL-SCNC: 87 MMOL/L
CHOLEST SERPL-MCNC: 191 MG/DL
CO2 SERPL-SCNC: 29 MMOL/L
CREAT SERPL-MCNC: 1.01 MG/DL
EGFR: 61 ML/MIN/1.73M2
EOSINOPHIL # BLD AUTO: 0.29 K/UL
EOSINOPHIL NFR BLD AUTO: 2.1 %
ESTIMATED AVERAGE GLUCOSE: 186 MG/DL
GLUCOSE SERPL-MCNC: 231 MG/DL
HBA1C MFR BLD HPLC: 8.1 %
HCT VFR BLD CALC: 35.5 %
HDLC SERPL-MCNC: 61 MG/DL
HGB BLD-MCNC: 10.5 G/DL
IMM GRANULOCYTES NFR BLD AUTO: 0.7 %
LDLC SERPL CALC-MCNC: 98 MG/DL
LYMPHOCYTES # BLD AUTO: 1.98 K/UL
LYMPHOCYTES NFR BLD AUTO: 14.3 %
MAN DIFF?: NORMAL
MCHC RBC-ENTMCNC: 23.9 PG
MCHC RBC-ENTMCNC: 29.6 GM/DL
MCV RBC AUTO: 80.7 FL
MONOCYTES # BLD AUTO: 0.82 K/UL
MONOCYTES NFR BLD AUTO: 5.9 %
NEUTROPHILS # BLD AUTO: 10.57 K/UL
NEUTROPHILS NFR BLD AUTO: 76.3 %
NONHDLC SERPL-MCNC: 130 MG/DL
PLATELET # BLD AUTO: 468 K/UL
POTASSIUM SERPL-SCNC: 3.5 MMOL/L
PROT SERPL-MCNC: 7 G/DL
RBC # BLD: 4.4 M/UL
RBC # FLD: 14.5 %
SODIUM SERPL-SCNC: 136 MMOL/L
TRIGL SERPL-MCNC: 184 MG/DL
WBC # FLD AUTO: 13.86 K/UL

## 2024-08-26 ENCOUNTER — RX RENEWAL (OUTPATIENT)
Age: 69
End: 2024-08-26

## 2024-10-07 ENCOUNTER — APPOINTMENT (OUTPATIENT)
Dept: INTERNAL MEDICINE | Facility: CLINIC | Age: 69
End: 2024-10-07

## 2024-10-16 ENCOUNTER — RX RENEWAL (OUTPATIENT)
Age: 69
End: 2024-10-16

## 2024-10-17 ENCOUNTER — RX RENEWAL (OUTPATIENT)
Age: 69
End: 2024-10-17

## 2024-10-26 ENCOUNTER — RX RENEWAL (OUTPATIENT)
Age: 69
End: 2024-10-26

## 2024-12-12 ENCOUNTER — RX RENEWAL (OUTPATIENT)
Age: 69
End: 2024-12-12

## 2025-02-05 ENCOUNTER — RX RENEWAL (OUTPATIENT)
Age: 70
End: 2025-02-05

## 2025-02-24 NOTE — H&P ADULT - NSHPOUTPATIENTPROVIDERS_GEN_ALL_CORE
Patient is following up on her call from earlier today to check the status of her disability forms.    Please call patient back.    Dr Handy Tan- PMD  [facility]

## 2025-03-03 ENCOUNTER — RX RENEWAL (OUTPATIENT)
Age: 70
End: 2025-03-03

## 2025-04-28 NOTE — ED PROVIDER NOTE - CARE PLAN
Refill request for  medication.     GLIMEPIRIDE 2 MG     Name of Pharmacy- [facility]      Last visit - 02/21/2025     Pending visit - 05/21/2025    Last refill -03/21/2025              Additional Comments     
Principal Discharge DX:	CHF (congestive heart failure)

## 2025-05-15 ENCOUNTER — RX RENEWAL (OUTPATIENT)
Age: 70
End: 2025-05-15

## 2025-05-22 ENCOUNTER — RX RENEWAL (OUTPATIENT)
Age: 70
End: 2025-05-22

## 2025-06-09 ENCOUNTER — RX RENEWAL (OUTPATIENT)
Age: 70
End: 2025-06-09

## 2025-07-08 ENCOUNTER — APPOINTMENT (OUTPATIENT)
Dept: INTERNAL MEDICINE | Facility: CLINIC | Age: 70
End: 2025-07-08
Payer: MEDICARE

## 2025-07-08 VITALS
WEIGHT: 174 LBS | DIASTOLIC BLOOD PRESSURE: 78 MMHG | SYSTOLIC BLOOD PRESSURE: 112 MMHG | HEART RATE: 82 BPM | BODY MASS INDEX: 39.7 KG/M2 | RESPIRATION RATE: 18 BRPM | OXYGEN SATURATION: 92 % | HEIGHT: 55.5 IN

## 2025-07-08 PROCEDURE — 99214 OFFICE O/P EST MOD 30 MIN: CPT | Mod: 25

## 2025-07-08 PROCEDURE — 36415 COLL VENOUS BLD VENIPUNCTURE: CPT

## 2025-07-08 PROCEDURE — G0439: CPT

## 2025-07-08 PROCEDURE — G2211 COMPLEX E/M VISIT ADD ON: CPT

## 2025-07-08 PROCEDURE — 93000 ELECTROCARDIOGRAM COMPLETE: CPT

## 2025-07-09 LAB
ALBUMIN SERPL ELPH-MCNC: 3.9 G/DL
ALP BLD-CCNC: 140 U/L
ALT SERPL-CCNC: 12 U/L
ANION GAP SERPL CALC-SCNC: 17 MMOL/L
APPEARANCE: CLEAR
AST SERPL-CCNC: 14 U/L
BACTERIA: NEGATIVE /HPF
BASOPHILS # BLD AUTO: 0.11 K/UL
BASOPHILS NFR BLD AUTO: 0.8 %
BILIRUB SERPL-MCNC: 0.2 MG/DL
BILIRUBIN URINE: NEGATIVE
BLOOD URINE: NEGATIVE
BUN SERPL-MCNC: 16 MG/DL
CALCIUM SERPL-MCNC: 9.7 MG/DL
CAST: 2 /LPF
CHLORIDE SERPL-SCNC: 92 MMOL/L
CHOLEST SERPL-MCNC: 182 MG/DL
CO2 SERPL-SCNC: 30 MMOL/L
COLOR: YELLOW
CREAT SERPL-MCNC: 0.76 MG/DL
CREAT SPEC-SCNC: 34 MG/DL
CRP SERPL-MCNC: 13 MG/L
EGFRCR SERPLBLD CKD-EPI 2021: 85 ML/MIN/1.73M2
EOSINOPHIL # BLD AUTO: 0.37 K/UL
EOSINOPHIL NFR BLD AUTO: 2.7 %
EPITHELIAL CELLS: 3 /HPF
ERYTHROCYTE [SEDIMENTATION RATE] IN BLOOD BY WESTERGREN METHOD: 101 MM/HR
ESTIMATED AVERAGE GLUCOSE: 209 MG/DL
GLUCOSE QUALITATIVE U: NEGATIVE MG/DL
GLUCOSE SERPL-MCNC: 218 MG/DL
HBA1C MFR BLD HPLC: 8.9 %
HCT VFR BLD CALC: 33.8 %
HDLC SERPL-MCNC: 60 MG/DL
HGB BLD-MCNC: 10.1 G/DL
IMM GRANULOCYTES NFR BLD AUTO: 0.4 %
KETONES URINE: NEGATIVE MG/DL
LDLC SERPL-MCNC: 91 MG/DL
LEUKOCYTE ESTERASE URINE: ABNORMAL
LYMPHOCYTES # BLD AUTO: 2.55 K/UL
LYMPHOCYTES NFR BLD AUTO: 18.8 %
MAN DIFF?: NORMAL
MCHC RBC-ENTMCNC: 23.7 PG
MCHC RBC-ENTMCNC: 29.9 G/DL
MCV RBC AUTO: 79.3 FL
MICROALBUMIN 24H UR DL<=1MG/L-MCNC: <1.2 MG/DL
MICROALBUMIN/CREAT 24H UR-RTO: NORMAL MG/G
MICROSCOPIC-UA: NORMAL
MONOCYTES # BLD AUTO: 0.93 K/UL
MONOCYTES NFR BLD AUTO: 6.9 %
NEUTROPHILS # BLD AUTO: 9.52 K/UL
NEUTROPHILS NFR BLD AUTO: 70.4 %
NITRITE URINE: NEGATIVE
NONHDLC SERPL-MCNC: 122 MG/DL
PH URINE: 6
PLATELET # BLD AUTO: 497 K/UL
POTASSIUM SERPL-SCNC: 4.5 MMOL/L
PROT SERPL-MCNC: 7.6 G/DL
PROTEIN URINE: NEGATIVE MG/DL
RBC # BLD: 4.26 M/UL
RBC # FLD: 14.1 %
RED BLOOD CELLS URINE: 1 /HPF
SODIUM SERPL-SCNC: 138 MMOL/L
SPECIFIC GRAVITY URINE: 1.01
T4 FREE SERPL-MCNC: 1.4 NG/DL
TRIGL SERPL-MCNC: 179 MG/DL
TSH SERPL-ACNC: 3.14 UIU/ML
UROBILINOGEN URINE: 0.2 MG/DL
WBC # FLD AUTO: 13.54 K/UL
WHITE BLOOD CELLS URINE: 1 /HPF